# Patient Record
Sex: FEMALE | Race: WHITE | NOT HISPANIC OR LATINO | URBAN - METROPOLITAN AREA
[De-identification: names, ages, dates, MRNs, and addresses within clinical notes are randomized per-mention and may not be internally consistent; named-entity substitution may affect disease eponyms.]

---

## 2019-11-20 ENCOUNTER — OUTPATIENT (OUTPATIENT)
Dept: OUTPATIENT SERVICES | Facility: HOSPITAL | Age: 79
LOS: 1 days | Discharge: HOME | End: 2019-11-20
Payer: MEDICARE

## 2019-11-20 DIAGNOSIS — I10 ESSENTIAL (PRIMARY) HYPERTENSION: ICD-10-CM

## 2019-11-20 DIAGNOSIS — Z87.891 PERSONAL HISTORY OF NICOTINE DEPENDENCE: ICD-10-CM

## 2019-11-20 DIAGNOSIS — I20.0 UNSTABLE ANGINA: ICD-10-CM

## 2019-11-20 DIAGNOSIS — I25.110 ATHEROSCLEROTIC HEART DISEASE OF NATIVE CORONARY ARTERY WITH UNSTABLE ANGINA PECTORIS: ICD-10-CM

## 2019-11-20 DIAGNOSIS — E11.9 TYPE 2 DIABETES MELLITUS WITHOUT COMPLICATIONS: ICD-10-CM

## 2019-11-20 DIAGNOSIS — E66.9 OBESITY, UNSPECIFIED: ICD-10-CM

## 2019-11-20 DIAGNOSIS — E78.49 OTHER HYPERLIPIDEMIA: ICD-10-CM

## 2019-11-20 DIAGNOSIS — G62.9 POLYNEUROPATHY, UNSPECIFIED: ICD-10-CM

## 2019-11-20 LAB
ANION GAP SERPL CALC-SCNC: 17 MMOL/L — HIGH (ref 7–14)
BUN SERPL-MCNC: 21 MG/DL — HIGH (ref 10–20)
CALCIUM SERPL-MCNC: 9.3 MG/DL — SIGNIFICANT CHANGE UP (ref 8.5–10.1)
CHLORIDE SERPL-SCNC: 102 MMOL/L — SIGNIFICANT CHANGE UP (ref 98–110)
CO2 SERPL-SCNC: 23 MMOL/L — SIGNIFICANT CHANGE UP (ref 17–32)
CREAT SERPL-MCNC: 1.4 MG/DL — SIGNIFICANT CHANGE UP (ref 0.7–1.5)
GLUCOSE SERPL-MCNC: 84 MG/DL — SIGNIFICANT CHANGE UP (ref 70–99)
HCT VFR BLD CALC: 34.2 % — LOW (ref 37–47)
HGB BLD-MCNC: 9.7 G/DL — LOW (ref 12–16)
MCHC RBC-ENTMCNC: 23.7 PG — LOW (ref 27–31)
MCHC RBC-ENTMCNC: 28.4 G/DL — LOW (ref 32–37)
MCV RBC AUTO: 83.6 FL — SIGNIFICANT CHANGE UP (ref 81–99)
NRBC # BLD: 0 /100 WBCS — SIGNIFICANT CHANGE UP (ref 0–0)
PLATELET # BLD AUTO: 323 K/UL — SIGNIFICANT CHANGE UP (ref 130–400)
POTASSIUM SERPL-MCNC: 4.8 MMOL/L — SIGNIFICANT CHANGE UP (ref 3.5–5)
POTASSIUM SERPL-SCNC: 4.8 MMOL/L — SIGNIFICANT CHANGE UP (ref 3.5–5)
RBC # BLD: 4.09 M/UL — LOW (ref 4.2–5.4)
RBC # FLD: 17.4 % — HIGH (ref 11.5–14.5)
SODIUM SERPL-SCNC: 142 MMOL/L — SIGNIFICANT CHANGE UP (ref 135–146)
WBC # BLD: 9.18 K/UL — SIGNIFICANT CHANGE UP (ref 4.8–10.8)
WBC # FLD AUTO: 9.18 K/UL — SIGNIFICANT CHANGE UP (ref 4.8–10.8)

## 2019-11-20 PROCEDURE — 92928 PRQ TCAT PLMT NTRAC ST 1 LES: CPT | Mod: LD

## 2019-11-20 PROCEDURE — 93010 ELECTROCARDIOGRAM REPORT: CPT

## 2019-11-20 RX ORDER — TICAGRELOR 90 MG/1
1 TABLET ORAL
Qty: 60 | Refills: 3
Start: 2019-11-20 | End: 2020-03-18

## 2019-11-20 RX ORDER — ATORVASTATIN CALCIUM 80 MG/1
1 TABLET, FILM COATED ORAL
Qty: 30 | Refills: 3
Start: 2019-11-20 | End: 2020-03-18

## 2019-11-20 RX ORDER — CLOPIDOGREL BISULFATE 75 MG/1
1 TABLET, FILM COATED ORAL
Qty: 30 | Refills: 0
Start: 2019-11-20 | End: 2019-12-19

## 2019-11-20 RX ORDER — ASPIRIN/CALCIUM CARB/MAGNESIUM 324 MG
1 TABLET ORAL
Qty: 30 | Refills: 3
Start: 2019-11-20 | End: 2020-03-18

## 2019-11-20 NOTE — CHART NOTE - NSCHARTNOTEFT_GEN_A_CORE
PRE-OP DIAGNOSIS: Unstable angina    PROCEDURE:  [x] [facility] with coronary angiography                           [ ] [facility]  Physician: Dr Martinez  Assistant: MD Dom    ANESTHESIA TYPE:  [  ]General Anesthesia  [x] Sedation  [x] Local/Regional    ESTIMATED BLOOD LOSS:    10   mL    CONDITION  [  ] Critical  [  ] Serious  [  ]Fair  [x]Good      SPECIMENS REMOVED (IF APPLICABLE):      IV CONTRAST:      150       mL      ACCESS:    [x] right radial artery  [ ] right femoral artery    CLOSURE: D-stat    LEFT HEART CATHETERIZATION:                                    Coronary circulation: There was significant 2-vessel coronary artery disease (LAD and circumflex). Left main: The vessel was medium sized. Angiography showed minor luminal irregularities with no flow limiting lesions. LAD: The vessel was medium to large sized. Angiography showed a single discrete lesion. Mid LAD: There was a 99 % stenosis. There was HELENA grade 1 flow through the vessel (slow flow without perfusion). This lesion is a likely culprit for the patient's anginal symptoms. An intervention was performed. 1st diagonal: The vessel was medium sized. Angiography showed minor luminal irregularities with no flow limiting lesions. Circumflex: The vessel was medium sized. Angiography showed a single discrete lesion. Mid circumflex: There was a diffuse 60 % stenosis. The lesion was irregularly contoured. There was HELENA grade 3 flow through the vessel (brisk flow). 1st obtuse marginal: The vessel was small sized and tortuous. Angiography showed minor luminal irregularities with no flow limiting lesions. 2nd obtuse marginal: The vessel was small to medium sized and tortuous. Angiography showed minor luminal irregularities with no flow limiting lesions. RCA: The vessel was large sized (dominant). Angiography showed minor luminal irregularities with no flow limiting lesions. Right PDA: The vessel was medium sized and tortuous. Angiography showed minor luminal irregularities with no flow limiting lesions.     Complications: No complications occurred during the cath lab visit.     Impressions: There is significant double vessel coronary artery disease. Status-post mid-LAD VASQUEZ.     Recommendations:   Add Aspirin and Brilinta.     The patient should continue with the present medications except as noted above.   Patient management should include aggressive medical therapy, weight reduction, and aggressive risk factor modification. The patient should follow a low fat and low calorie diet.     PLAN OF CARE  [x] D/C Home today  [x] Continue DAPT, B-blocker & Statin therapy

## 2019-11-20 NOTE — PROGRESS NOTE ADULT - SUBJECTIVE AND OBJECTIVE BOX
Cardiology Follow up    KIMBER BARBER   79y Female  PAST MEDICAL & SURGICAL HISTORY:  Neuropathy  HTN (hypertension)  HLD (hyperlipidemia)  CAD (coronary artery disease)  DM (diabetes mellitus)       HPI:  Pre cath note:79 F wtih SIHD, DM, HTN presenting for an elective cardiac cath. 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 Complains of chest pressure intermittently.     indication:  [ ] STEMI                [ ] NSTEMI                 [ ] Acute coronary syndrome                     [x]Unstable Angina   [ ] high risk  [x] intermediate risk  [ ] low risk                     [ ] Stable Angina     non-invasive testing:             Echo             Date:                     result: [ ] high risk  [x] intermediate risk  [ ] low risk    Anti- Anginal medications:                    [ ] not used                       [ ] used                   [ ] not used but strong indication not to use    Ejection Fraction                   [ ] <29            [ ] 30-39%   [x] 40-49%     [ ]>50%    CHF                   [ ] active (within last 14 days on meds   [ ] Chronic (on meds but no exacerbation)    COPD                   [ ] mild (on chronic bronchodilators)  [ ] moderate (on chronic steroid therapy)      [ ] severe (indication for home O2 or PACO2 >50)    Other risk factors:                       [ ] Previous MI                     [ ] CVA/ stroke                    [ ] carotid stent/ CEA                    [ ] PVD/PAD- (arterial aneurysm, non-palpable pulses, tortuous vessel with inability to insert catheter, infra-renal dissection, renal or subclavian artery stenosis)                    [x] diabetic                    [ ] previous CABG                    [ ] Renal Failure (20 Nov 2019 09:20)    Allergies    No Known Allergies    Intolerances    Patient without complaints.   Denies CP, SOB, palpitations, or dizziness    HR: 65  BP: 136/66  BP(mean): 88  RR: 14  SpO2: 99% on RA    REVIEW OF SYSTEMS:          CONSTITUTIONAL: No weakness, fevers or chills          EYES/ENT: No visual changes;  No vertigo or throat pain           NECK: No pain or stiffness          RESPIRATORY: No cough, wheezing, hemoptysis          CARDIOVASCULAR: no pain, no JAIN, no palpitations           GASTROINTESTINAL: No abdominal or epigastric pain. No nausea, vomiting, or hematemesis;           GENITOURINARY: No dysuria, frequency or hematuria          NEUROLOGICAL: No numbness or weakness          SKIN: No itching, rashes    PHYSICAL EXAM:           CONSTITUTIONAL: Well-developed; well-nourished; in no acute distress  	SKIN: warm, dry  	HEAD: Normocephalic; atraumatic  	EYES: PERRL.  	ENT: No nasal discharge, airway clear, mucous membranes moist  	NECK: Supple; non tender.  	CARD: +S1, +S2, no murmurs, gallops, or rubs. Regular rate and rhythm    	RESP: No wheezes, rales or rhonchi. CTA B/L  	ABD: soft ntnd, + BS x 4 quadrants  	EXT: moves all extremities,  no clubbing, cyanosis or edema  	NEURO: Alert and oriented x3, no focal deficits          PSYCH: Cooperative, appropriate          VASCULAR:  +2 Rad / +2PTs / + 2DPs          EXTREMITY:             Right Groin:  Dressing removed, soft, no hematoma, no pain  	   Right Radial: Dressing removed, soft, no hematoma, no pain            ECG:   P    LABS:                        9.7    9.18  )-----------( 323      ( 20 Nov 2019 10:12 )             34.2     11-20    142  |  102  |  21<H>  ----------------------------<  84  4.8   |  23  |  1.4    Ca    9.3      20 Nov 2019 10:12      A/P:  I discussed the case with Cardiologist Dr. Rolon and recommend the following:    s/p PCI:  significant double vessel coronary artery disease. Status-post mid-LAD VASQUEZ.                        12 Lead ECG at 4 pm                    Xanan 0.5 mg PO x1 now                    Brilinta D/C due to dyspnea                    Load with Plavix 300 mg PO x1 now  	     Continue DAPT ( Aspirin 81 mg daily and Plavix 75 mg daily ), B-Blocker, Statin Therapy                   Patient given 30 day supply of ( Aspirin 81 mg daily and Plavix 75 mg daily ) to take at home                   Patient agreeing to take DAPT for at least one year or as directed by cardiologist                    Pt given instructions on importance of taking antiplatelet medication or risk acute stent thrombosis/death                   Post cath instructions, access site care and activity restrictions reviewed with patient                     Discussed with patient to return to hospital if experience chest pain, shortness breath, dizziness and site bleeding                   Aggressive risk factor modification, diet counseling, smoking cessation discussed with patient                       Can discharge patient from cardiac standpoint after ambulating without symptoms, ECG reviewed and access site wnl.                   Follow up with Cardiology Dr. Rolon  in one week.  Instructed to call and make an appointment Cardiology Follow up    KIMBER BARBER   79y Female  PAST MEDICAL & SURGICAL HISTORY:  Neuropathy  HTN (hypertension)  HLD (hyperlipidemia)  CAD (coronary artery disease)  DM (diabetes mellitus)       HPI:  Pre cath note:79 F wtih SIHD, DM, HTN presenting for an elective cardiac cath. 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 Complains of chest pressure intermittently.     indication:  [ ] STEMI                [ ] NSTEMI                 [ ] Acute coronary syndrome                     [x]Unstable Angina   [ ] high risk  [x] intermediate risk  [ ] low risk                     [ ] Stable Angina     non-invasive testing:             Echo             Date:                     result: [ ] high risk  [x] intermediate risk  [ ] low risk    Anti- Anginal medications:                    [ ] not used                       [ ] used                   [ ] not used but strong indication not to use    Ejection Fraction                   [ ] <29            [ ] 30-39%   [x] 40-49%     [ ]>50%    CHF                   [ ] active (within last 14 days on meds   [ ] Chronic (on meds but no exacerbation)    COPD                   [ ] mild (on chronic bronchodilators)  [ ] moderate (on chronic steroid therapy)      [ ] severe (indication for home O2 or PACO2 >50)    Other risk factors:                       [ ] Previous MI                     [ ] CVA/ stroke                    [ ] carotid stent/ CEA                    [ ] PVD/PAD- (arterial aneurysm, non-palpable pulses, tortuous vessel with inability to insert catheter, infra-renal dissection, renal or subclavian artery stenosis)                    [x] diabetic                    [ ] previous CABG                    [ ] Renal Failure (20 Nov 2019 09:20)    Allergies    No Known Allergies    Intolerances    Patient without complaints.   Denies CP, SOB, palpitations, or dizziness    HR: 65  BP: 136/66  BP(mean): 88  RR: 14  SpO2: 99% on RA    REVIEW OF SYSTEMS:          CONSTITUTIONAL: No weakness, fevers or chills          EYES/ENT: No visual changes;  No vertigo or throat pain           NECK: No pain or stiffness          RESPIRATORY: No cough, wheezing, hemoptysis          CARDIOVASCULAR: no pain, no JAIN, no palpitations           GASTROINTESTINAL: No abdominal or epigastric pain. No nausea, vomiting, or hematemesis;           GENITOURINARY: No dysuria, frequency or hematuria          NEUROLOGICAL: No numbness or weakness          SKIN: No itching, rashes    PHYSICAL EXAM:           CONSTITUTIONAL: Well-developed; well-nourished; in no acute distress  	SKIN: warm, dry  	HEAD: Normocephalic; atraumatic  	EYES: PERRL.  	ENT: No nasal discharge, airway clear, mucous membranes moist  	NECK: Supple; non tender.  	CARD: +S1, +S2, no murmurs, gallops, or rubs. Regular rate and rhythm    	RESP: No wheezes, rales or rhonchi. CTA B/L  	ABD: soft ntnd, + BS x 4 quadrants  	EXT: moves all extremities,  no clubbing, cyanosis or edema  	NEURO: Alert and oriented x3, no focal deficits          PSYCH: Cooperative, appropriate          VASCULAR:  +2 Rad / +2PTs / + 2DPs          EXTREMITY:             Right Groin:  Dressing removed, soft, no hematoma, no pain  	   Right Radial: Dressing removed, soft, no hematoma, no pain            ECG:   P    LABS:                        9.7    9.18  )-----------( 323      ( 20 Nov 2019 10:12 )             34.2     11-20    142  |  102  |  21<H>  ----------------------------<  84  4.8   |  23  |  1.4    Ca    9.3      20 Nov 2019 10:12      A/P:  I discussed the case with Cardiologist Dr. Rolon and recommend the following:    s/p PCI:  significant double vessel coronary artery disease. Status-post mid-LAD VASQUEZ.                        12 Lead ECG at 4 pm                    Xanax 0.5 mg PO x1 now                    Brilinta D/C due to dyspnea                    Load with Plavix 300 mg PO x1 now  	     Continue DAPT ( Aspirin 81 mg daily and Plavix 75 mg daily ), B-Blocker, Statin Therapy                   Patient given 30 day supply of ( Aspirin 81 mg daily and Plavix 75 mg daily ) to take at home                   Patient agreeing to take DAPT for at least one year or as directed by cardiologist                    Pt given instructions on importance of taking antiplatelet medication or risk acute stent thrombosis/death                   Post cath instructions, access site care and activity restrictions reviewed with patient                     Discussed with patient to return to hospital if experience chest pain, shortness breath, dizziness and site bleeding                   Aggressive risk factor modification, diet counseling, smoking cessation discussed with patient                       Can discharge patient from cardiac standpoint after ambulating without symptoms, ECG reviewed and access site wnl.                   Follow up with Cardiology Dr. Rolon  in one week.  Instructed to call and make an appointment

## 2019-11-20 NOTE — H&P CARDIOLOGY - HISTORY OF PRESENT ILLNESS
Pre cath note:79 F wtih SIHD, DM, HTN presenting for an elective cardiac cath. 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 Complains of chest pressure intermittently.     indication:  [ ] STEMI                [ ] NSTEMI                 [ ] Acute coronary syndrome                     [x]Unstable Angina   [ ] high risk  [x] intermediate risk  [ ] low risk                     [ ] Stable Angina     non-invasive testing:             Echo             Date:                     result: [ ] high risk  [x] intermediate risk  [ ] low risk    Anti- Anginal medications:                    [ ] not used                       [ ] used                   [ ] not used but strong indication not to use    Ejection Fraction                   [ ] <29            [ ] 30-39%   [x] 40-49%     [ ]>50%    CHF                   [ ] active (within last 14 days on meds   [ ] Chronic (on meds but no exacerbation)    COPD                   [ ] mild (on chronic bronchodilators)  [ ] moderate (on chronic steroid therapy)      [ ] severe (indication for home O2 or PACO2 >50)    Other risk factors:                       [ ] Previous MI                     [ ] CVA/ stroke                    [ ] carotid stent/ CEA                    [ ] PVD/PAD- (arterial aneurysm, non-palpable pulses, tortuous vessel with inability to insert catheter, infra-renal dissection, renal or subclavian artery stenosis)                    [x] diabetic                    [ ] previous CABG                    [ ] Renal Failure

## 2019-11-20 NOTE — ASU PATIENT PROFILE, ADULT - PMH
CAD (coronary artery disease)    DM (diabetes mellitus)    HLD (hyperlipidemia)    HTN (hypertension)    Neuropathy

## 2019-11-21 LAB — GLUCOSE BLDC GLUCOMTR-MCNC: 79 MG/DL — SIGNIFICANT CHANGE UP (ref 70–99)

## 2022-08-30 PROBLEM — I10 ESSENTIAL (PRIMARY) HYPERTENSION: Chronic | Status: ACTIVE | Noted: 2019-11-20

## 2022-08-30 PROBLEM — E78.5 HYPERLIPIDEMIA, UNSPECIFIED: Chronic | Status: ACTIVE | Noted: 2019-11-20

## 2022-08-30 PROBLEM — I25.10 ATHEROSCLEROTIC HEART DISEASE OF NATIVE CORONARY ARTERY WITHOUT ANGINA PECTORIS: Chronic | Status: ACTIVE | Noted: 2019-11-20

## 2022-08-30 PROBLEM — G62.9 POLYNEUROPATHY, UNSPECIFIED: Chronic | Status: ACTIVE | Noted: 2019-11-20

## 2022-08-30 PROBLEM — E11.9 TYPE 2 DIABETES MELLITUS WITHOUT COMPLICATIONS: Chronic | Status: ACTIVE | Noted: 2019-11-20

## 2023-01-22 ENCOUNTER — INPATIENT (INPATIENT)
Facility: HOSPITAL | Age: 83
LOS: 9 days | Discharge: HOME HEALTH | End: 2023-02-01
Attending: STUDENT IN AN ORGANIZED HEALTH CARE EDUCATION/TRAINING PROGRAM | Admitting: STUDENT IN AN ORGANIZED HEALTH CARE EDUCATION/TRAINING PROGRAM
Payer: MEDICARE

## 2023-01-22 VITALS
HEART RATE: 92 BPM | OXYGEN SATURATION: 97 % | SYSTOLIC BLOOD PRESSURE: 152 MMHG | RESPIRATION RATE: 18 BRPM | WEIGHT: 169.98 LBS | DIASTOLIC BLOOD PRESSURE: 66 MMHG | TEMPERATURE: 98 F

## 2023-01-22 LAB
ALBUMIN SERPL ELPH-MCNC: 3.7 G/DL — SIGNIFICANT CHANGE UP (ref 3.5–5.2)
ALP SERPL-CCNC: 72 U/L — SIGNIFICANT CHANGE UP (ref 30–115)
ALT FLD-CCNC: 17 U/L — SIGNIFICANT CHANGE UP (ref 0–41)
ANION GAP SERPL CALC-SCNC: 11 MMOL/L — SIGNIFICANT CHANGE UP (ref 7–14)
AST SERPL-CCNC: 47 U/L — HIGH (ref 0–41)
BASE EXCESS BLDV CALC-SCNC: 0.1 MMOL/L — SIGNIFICANT CHANGE UP (ref -2–3)
BASOPHILS # BLD AUTO: 0.02 K/UL — SIGNIFICANT CHANGE UP (ref 0–0.2)
BASOPHILS NFR BLD AUTO: 0.2 % — SIGNIFICANT CHANGE UP (ref 0–1)
BILIRUB SERPL-MCNC: <0.2 MG/DL — SIGNIFICANT CHANGE UP (ref 0.2–1.2)
BUN SERPL-MCNC: 16 MG/DL — SIGNIFICANT CHANGE UP (ref 10–20)
CA-I SERPL-SCNC: 1.24 MMOL/L — SIGNIFICANT CHANGE UP (ref 1.15–1.33)
CALCIUM SERPL-MCNC: 9.2 MG/DL — SIGNIFICANT CHANGE UP (ref 8.4–10.5)
CHLORIDE SERPL-SCNC: 109 MMOL/L — SIGNIFICANT CHANGE UP (ref 98–110)
CO2 SERPL-SCNC: 23 MMOL/L — SIGNIFICANT CHANGE UP (ref 17–32)
CREAT SERPL-MCNC: 0.9 MG/DL — SIGNIFICANT CHANGE UP (ref 0.7–1.5)
EGFR: 64 ML/MIN/1.73M2 — SIGNIFICANT CHANGE UP
EOSINOPHIL # BLD AUTO: 0.22 K/UL — SIGNIFICANT CHANGE UP (ref 0–0.7)
EOSINOPHIL NFR BLD AUTO: 2 % — SIGNIFICANT CHANGE UP (ref 0–8)
FLUAV AG NPH QL: SIGNIFICANT CHANGE UP
FLUBV AG NPH QL: SIGNIFICANT CHANGE UP
GAS PNL BLDV: 134 MMOL/L — LOW (ref 136–145)
GAS PNL BLDV: SIGNIFICANT CHANGE UP
GLUCOSE SERPL-MCNC: 99 MG/DL — SIGNIFICANT CHANGE UP (ref 70–99)
HCO3 BLDV-SCNC: 25 MMOL/L — SIGNIFICANT CHANGE UP (ref 22–29)
HCT VFR BLD CALC: 32.9 % — LOW (ref 37–47)
HCT VFR BLDA CALC: 35 % — LOW (ref 39–51)
HGB BLD CALC-MCNC: 11.6 G/DL — LOW (ref 12.6–17.4)
HGB BLD-MCNC: 9.3 G/DL — LOW (ref 12–16)
IMM GRANULOCYTES NFR BLD AUTO: 0.7 % — HIGH (ref 0.1–0.3)
LACTATE BLDV-MCNC: 0.9 MMOL/L — SIGNIFICANT CHANGE UP (ref 0.5–2)
LYMPHOCYTES # BLD AUTO: 1.32 K/UL — SIGNIFICANT CHANGE UP (ref 1.2–3.4)
LYMPHOCYTES # BLD AUTO: 12.3 % — LOW (ref 20.5–51.1)
MAGNESIUM SERPL-MCNC: 2 MG/DL — SIGNIFICANT CHANGE UP (ref 1.8–2.4)
MCHC RBC-ENTMCNC: 23 PG — LOW (ref 27–31)
MCHC RBC-ENTMCNC: 28.3 G/DL — LOW (ref 32–37)
MCV RBC AUTO: 81.4 FL — SIGNIFICANT CHANGE UP (ref 81–99)
MONOCYTES # BLD AUTO: 0.73 K/UL — HIGH (ref 0.1–0.6)
MONOCYTES NFR BLD AUTO: 6.8 % — SIGNIFICANT CHANGE UP (ref 1.7–9.3)
NEUTROPHILS # BLD AUTO: 8.4 K/UL — HIGH (ref 1.4–6.5)
NEUTROPHILS NFR BLD AUTO: 78 % — HIGH (ref 42.2–75.2)
NRBC # BLD: 0 /100 WBCS — SIGNIFICANT CHANGE UP (ref 0–0)
NT-PROBNP SERPL-SCNC: 681 PG/ML — HIGH (ref 0–300)
PCO2 BLDV: 39 MMHG — SIGNIFICANT CHANGE UP (ref 39–42)
PH BLDV: 7.41 — SIGNIFICANT CHANGE UP (ref 7.32–7.43)
PLATELET # BLD AUTO: 573 K/UL — HIGH (ref 130–400)
PO2 BLDV: 54 MMHG — SIGNIFICANT CHANGE UP
POTASSIUM BLDV-SCNC: 4.3 MMOL/L — SIGNIFICANT CHANGE UP (ref 3.5–5.1)
POTASSIUM SERPL-MCNC: 4.8 MMOL/L — SIGNIFICANT CHANGE UP (ref 3.5–5)
POTASSIUM SERPL-SCNC: 4.8 MMOL/L — SIGNIFICANT CHANGE UP (ref 3.5–5)
PROT SERPL-MCNC: 6.3 G/DL — SIGNIFICANT CHANGE UP (ref 6–8)
RBC # BLD: 4.04 M/UL — LOW (ref 4.2–5.4)
RBC # FLD: 19.8 % — HIGH (ref 11.5–14.5)
RSV RNA NPH QL NAA+NON-PROBE: SIGNIFICANT CHANGE UP
SAO2 % BLDV: 87.2 % — SIGNIFICANT CHANGE UP
SARS-COV-2 RNA SPEC QL NAA+PROBE: SIGNIFICANT CHANGE UP
SODIUM SERPL-SCNC: 143 MMOL/L — SIGNIFICANT CHANGE UP (ref 135–146)
TROPONIN T SERPL-MCNC: <0.01 NG/ML — SIGNIFICANT CHANGE UP
WBC # BLD: 10.76 K/UL — SIGNIFICANT CHANGE UP (ref 4.8–10.8)
WBC # FLD AUTO: 10.76 K/UL — SIGNIFICANT CHANGE UP (ref 4.8–10.8)

## 2023-01-22 PROCEDURE — 71045 X-RAY EXAM CHEST 1 VIEW: CPT | Mod: 26

## 2023-01-22 PROCEDURE — 99285 EMERGENCY DEPT VISIT HI MDM: CPT

## 2023-01-22 RX ORDER — IPRATROPIUM/ALBUTEROL SULFATE 18-103MCG
3 AEROSOL WITH ADAPTER (GRAM) INHALATION ONCE
Refills: 0 | Status: COMPLETED | OUTPATIENT
Start: 2023-01-22 | End: 2023-01-22

## 2023-01-22 RX ADMIN — Medication 3 MILLILITER(S): at 20:00

## 2023-01-22 NOTE — ED PROVIDER NOTE - NS ED ATTENDING STATEMENT MOD
This was a shared visit with the ESPERANZA. I reviewed and verified the documentation and independently performed the documented:

## 2023-01-22 NOTE — ED PROVIDER NOTE - PHYSICAL EXAMINATION
Constitutional: Well developed, well nourished. NAD  Head: Normocephalic, atraumatic.  Eyes: PERRL, EOMI.  ENT: No nasal discharge. Mucous membranes dry.  Neck: Supple. Painless ROM.  Cardiovascular: Regular rate and rhythm.    Pulmonary: + bibasilar rales/scattered rhonchi;     Abdominal: Soft. Nondistended. No rebound, guarding, rigidity.  Extremities. Pelvis stable. No lower extremity edema, symmetric calves.  Skin: No rashes, cyanosis.  Neuro: AAOx3. No focal neurological deficits.  Psych: Normal mood. Normal affect.

## 2023-01-22 NOTE — ED ADULT TRIAGE NOTE - CHIEF COMPLAINT QUOTE
Pt BIBA c/o COPD exacerbation states she has been having worsening SOB than usual for the last few days, O2 sat went down to 88% today. EMS endorses O2 Sat 93% on room air on their arrival. Pt not on home O2.   Pt on 2L O2 via NC now sat 97%, denies SOB/chest pain

## 2023-01-22 NOTE — ED ADULT NURSE NOTE - PRIMARY CARE PROVIDER
Wife reports feeling \"uncomfortable taking him home. \"  Concerned pt will fall \"if I try to help him go to the bathroom. \" PMD

## 2023-01-22 NOTE — ED PROVIDER NOTE - NS ED ROS FT
Constitutional: No fever, chills.  Eyes: No visual changes.  ENT: No hearing changes.  Neck: No neck pain or stiffness.  Cardiovascular: No chest pain, palpitations, edema.  Pulmonary:  see hpi  Abdominal:  No nausea, vomiting, diarrhea.  : No dysuria, frequency.  Neuro: No headache, syncope, dizziness.  MS: No back pain. No calf pain/swelling.  Psych: No suicidal ideations.

## 2023-01-22 NOTE — ED PROVIDER NOTE - NSICDXPASTMEDICALHX_GEN_ALL_CORE_FT
PAST MEDICAL HISTORY:  CAD (coronary artery disease)     DM (diabetes mellitus)     HLD (hyperlipidemia)     HTN (hypertension)     Neuropathy

## 2023-01-22 NOTE — ED PROVIDER NOTE - OBJECTIVE STATEMENT
82 yold female to Ed Pmhx Copd not home O2, Hx anemia on IV iron suppliments, Cad with stent x 1 2019, Dm, Htn; pt c/o sob x 1 week with cough clear phlegm, orthopnea, exertional dyspnea; pt denies fever, chills, sore throat, difficulty swallowing, chest pain, abdominal or back pain; pt not utd with covid/flu vaccinations; s/p recent treatment for uti with bactrim/cipro

## 2023-01-22 NOTE — ED PROVIDER NOTE - CARE PLAN
Principal Discharge DX:	Dyspnea  Secondary Diagnosis:	COPD exacerbation  Secondary Diagnosis:	Chest pain   1

## 2023-01-22 NOTE — ED PROVIDER NOTE - CLINICAL SUMMARY MEDICAL DECISION MAKING FREE TEXT BOX
82-year-old female with history of COPD not on home O2, CAD s/p stent, DM, HTN, anemia in ER with c/o 1 week h/o SOB, cough with clear sputum, decreased exercise tolerance.  + Episode of chest pain yesterday lasting for few minutes, now resolved.  No F/C.  No abdominal pain.  No N/V/D.  No lower extremity swelling.  PE - nad, nc/at, eomi, perrl, op - clear, mmm, neck supple, no resp distress, + b/l rhonchi, no w/r/r, rrr, abd- soft, nt/nd, nabs, from x 4, no LE swelling/tenderness, A&O x 3, cn 2-12 intact, no focal motor/sensory deficits.     -Labs reviewed: WBC 10, Hgb 9.3, CMP unremarkable, troponin negative, , PCO2 39 on VBG.  Nasal swab sent and pending.  CXR with bilateral opacities.  Patient given nebs, steroids, antibiotics in ER.  Patient admitted to medicine for continued care.

## 2023-01-23 LAB
A1C WITH ESTIMATED AVERAGE GLUCOSE RESULT: 5.6 % — SIGNIFICANT CHANGE UP (ref 4–5.6)
ALBUMIN SERPL ELPH-MCNC: 4 G/DL — SIGNIFICANT CHANGE UP (ref 3.5–5.2)
ALP SERPL-CCNC: 77 U/L — SIGNIFICANT CHANGE UP (ref 30–115)
ALT FLD-CCNC: 14 U/L — SIGNIFICANT CHANGE UP (ref 0–41)
ANION GAP SERPL CALC-SCNC: 12 MMOL/L — SIGNIFICANT CHANGE UP (ref 7–14)
AST SERPL-CCNC: 15 U/L — SIGNIFICANT CHANGE UP (ref 0–41)
BASOPHILS # BLD AUTO: 0.01 K/UL — SIGNIFICANT CHANGE UP (ref 0–0.2)
BASOPHILS NFR BLD AUTO: 0.1 % — SIGNIFICANT CHANGE UP (ref 0–1)
BILIRUB SERPL-MCNC: <0.2 MG/DL — SIGNIFICANT CHANGE UP (ref 0.2–1.2)
BUN SERPL-MCNC: 14 MG/DL — SIGNIFICANT CHANGE UP (ref 10–20)
CALCIUM SERPL-MCNC: 9.5 MG/DL — SIGNIFICANT CHANGE UP (ref 8.4–10.5)
CHLORIDE SERPL-SCNC: 106 MMOL/L — SIGNIFICANT CHANGE UP (ref 98–110)
CO2 SERPL-SCNC: 23 MMOL/L — SIGNIFICANT CHANGE UP (ref 17–32)
CREAT SERPL-MCNC: 0.8 MG/DL — SIGNIFICANT CHANGE UP (ref 0.7–1.5)
EGFR: 74 ML/MIN/1.73M2 — SIGNIFICANT CHANGE UP
EOSINOPHIL # BLD AUTO: 0.01 K/UL — SIGNIFICANT CHANGE UP (ref 0–0.7)
EOSINOPHIL NFR BLD AUTO: 0.1 % — SIGNIFICANT CHANGE UP (ref 0–8)
ESTIMATED AVERAGE GLUCOSE: 114 MG/DL — SIGNIFICANT CHANGE UP (ref 68–114)
FERRITIN SERPL-MCNC: 207 NG/ML — HIGH (ref 15–150)
GLUCOSE BLDC GLUCOMTR-MCNC: 139 MG/DL — HIGH (ref 70–99)
GLUCOSE BLDC GLUCOMTR-MCNC: 155 MG/DL — HIGH (ref 70–99)
GLUCOSE BLDC GLUCOMTR-MCNC: 163 MG/DL — HIGH (ref 70–99)
GLUCOSE SERPL-MCNC: 150 MG/DL — HIGH (ref 70–99)
HCT VFR BLD CALC: 32.1 % — LOW (ref 37–47)
HGB BLD-MCNC: 9.4 G/DL — LOW (ref 12–16)
IMM GRANULOCYTES NFR BLD AUTO: 0.7 % — HIGH (ref 0.1–0.3)
IRON SATN MFR SERPL: 10 % — LOW (ref 15–50)
IRON SATN MFR SERPL: 27 UG/DL — LOW (ref 35–150)
LYMPHOCYTES # BLD AUTO: 0.34 K/UL — LOW (ref 1.2–3.4)
LYMPHOCYTES # BLD AUTO: 3.5 % — LOW (ref 20.5–51.1)
MAGNESIUM SERPL-MCNC: 2.1 MG/DL — SIGNIFICANT CHANGE UP (ref 1.8–2.4)
MCHC RBC-ENTMCNC: 23.6 PG — LOW (ref 27–31)
MCHC RBC-ENTMCNC: 29.3 G/DL — LOW (ref 32–37)
MCV RBC AUTO: 80.5 FL — LOW (ref 81–99)
MONOCYTES # BLD AUTO: 0.07 K/UL — LOW (ref 0.1–0.6)
MONOCYTES NFR BLD AUTO: 0.7 % — LOW (ref 1.7–9.3)
NEUTROPHILS # BLD AUTO: 9.2 K/UL — HIGH (ref 1.4–6.5)
NEUTROPHILS NFR BLD AUTO: 94.9 % — HIGH (ref 42.2–75.2)
NRBC # BLD: 0 /100 WBCS — SIGNIFICANT CHANGE UP (ref 0–0)
PLATELET # BLD AUTO: 533 K/UL — HIGH (ref 130–400)
POTASSIUM SERPL-MCNC: 4.2 MMOL/L — SIGNIFICANT CHANGE UP (ref 3.5–5)
POTASSIUM SERPL-SCNC: 4.2 MMOL/L — SIGNIFICANT CHANGE UP (ref 3.5–5)
PROCALCITONIN SERPL-MCNC: 0.09 NG/ML — SIGNIFICANT CHANGE UP (ref 0.02–0.1)
PROT SERPL-MCNC: 6.5 G/DL — SIGNIFICANT CHANGE UP (ref 6–8)
RBC # BLD: 3.99 M/UL — LOW (ref 4.2–5.4)
RBC # FLD: 19.5 % — HIGH (ref 11.5–14.5)
SODIUM SERPL-SCNC: 141 MMOL/L — SIGNIFICANT CHANGE UP (ref 135–146)
TIBC SERPL-MCNC: 269 UG/DL — SIGNIFICANT CHANGE UP (ref 220–430)
TROPONIN T SERPL-MCNC: <0.01 NG/ML — SIGNIFICANT CHANGE UP
UIBC SERPL-MCNC: 242 UG/DL — SIGNIFICANT CHANGE UP (ref 110–370)
WBC # BLD: 9.7 K/UL — SIGNIFICANT CHANGE UP (ref 4.8–10.8)
WBC # FLD AUTO: 9.7 K/UL — SIGNIFICANT CHANGE UP (ref 4.8–10.8)

## 2023-01-23 PROCEDURE — 71250 CT THORAX DX C-: CPT | Mod: 26

## 2023-01-23 PROCEDURE — 99222 1ST HOSP IP/OBS MODERATE 55: CPT

## 2023-01-23 PROCEDURE — 99497 ADVNCD CARE PLAN 30 MIN: CPT

## 2023-01-23 PROCEDURE — 93010 ELECTROCARDIOGRAM REPORT: CPT

## 2023-01-23 RX ORDER — SODIUM CHLORIDE 9 MG/ML
1000 INJECTION, SOLUTION INTRAVENOUS
Refills: 0 | Status: DISCONTINUED | OUTPATIENT
Start: 2023-01-23 | End: 2023-02-01

## 2023-01-23 RX ORDER — CEFEPIME 1 G/1
2000 INJECTION, POWDER, FOR SOLUTION INTRAMUSCULAR; INTRAVENOUS ONCE
Refills: 0 | Status: COMPLETED | OUTPATIENT
Start: 2023-01-23 | End: 2023-01-23

## 2023-01-23 RX ORDER — CARVEDILOL PHOSPHATE 80 MG/1
1 CAPSULE, EXTENDED RELEASE ORAL
Qty: 0 | Refills: 0 | DISCHARGE

## 2023-01-23 RX ORDER — LOSARTAN POTASSIUM 100 MG/1
25 TABLET, FILM COATED ORAL DAILY
Refills: 0 | Status: DISCONTINUED | OUTPATIENT
Start: 2023-01-23 | End: 2023-02-01

## 2023-01-23 RX ORDER — GABAPENTIN 400 MG/1
600 CAPSULE ORAL
Refills: 0 | Status: DISCONTINUED | OUTPATIENT
Start: 2023-01-23 | End: 2023-02-01

## 2023-01-23 RX ORDER — AZITHROMYCIN 500 MG/1
500 TABLET, FILM COATED ORAL EVERY 24 HOURS
Refills: 0 | Status: DISCONTINUED | OUTPATIENT
Start: 2023-01-23 | End: 2023-01-23

## 2023-01-23 RX ORDER — AZITHROMYCIN 500 MG/1
500 TABLET, FILM COATED ORAL ONCE
Refills: 0 | Status: COMPLETED | OUTPATIENT
Start: 2023-01-23 | End: 2023-01-23

## 2023-01-23 RX ORDER — CLOPIDOGREL BISULFATE 75 MG/1
75 TABLET, FILM COATED ORAL DAILY
Refills: 0 | Status: DISCONTINUED | OUTPATIENT
Start: 2023-01-23 | End: 2023-02-01

## 2023-01-23 RX ORDER — OXYBUTYNIN CHLORIDE 5 MG
5 TABLET ORAL
Refills: 0 | Status: DISCONTINUED | OUTPATIENT
Start: 2023-01-23 | End: 2023-02-01

## 2023-01-23 RX ORDER — ONDANSETRON 8 MG/1
4 TABLET, FILM COATED ORAL EVERY 8 HOURS
Refills: 0 | Status: DISCONTINUED | OUTPATIENT
Start: 2023-01-23 | End: 2023-02-01

## 2023-01-23 RX ORDER — CEFEPIME 1 G/1
2000 INJECTION, POWDER, FOR SOLUTION INTRAMUSCULAR; INTRAVENOUS EVERY 8 HOURS
Refills: 0 | Status: DISCONTINUED | OUTPATIENT
Start: 2023-01-24 | End: 2023-01-25

## 2023-01-23 RX ORDER — CARVEDILOL PHOSPHATE 80 MG/1
12.5 CAPSULE, EXTENDED RELEASE ORAL EVERY 12 HOURS
Refills: 0 | Status: DISCONTINUED | OUTPATIENT
Start: 2023-01-23 | End: 2023-02-01

## 2023-01-23 RX ORDER — GABAPENTIN 400 MG/1
1 CAPSULE ORAL
Qty: 0 | Refills: 0 | DISCHARGE

## 2023-01-23 RX ORDER — ASPIRIN/CALCIUM CARB/MAGNESIUM 324 MG
1 TABLET ORAL
Qty: 0 | Refills: 3 | DISCHARGE

## 2023-01-23 RX ORDER — LANOLIN ALCOHOL/MO/W.PET/CERES
3 CREAM (GRAM) TOPICAL AT BEDTIME
Refills: 0 | Status: DISCONTINUED | OUTPATIENT
Start: 2023-01-23 | End: 2023-02-01

## 2023-01-23 RX ORDER — BUDESONIDE AND FORMOTEROL FUMARATE DIHYDRATE 160; 4.5 UG/1; UG/1
2 AEROSOL RESPIRATORY (INHALATION)
Refills: 0 | Status: DISCONTINUED | OUTPATIENT
Start: 2023-01-23 | End: 2023-02-01

## 2023-01-23 RX ORDER — CEFEPIME 1 G/1
2000 INJECTION, POWDER, FOR SOLUTION INTRAMUSCULAR; INTRAVENOUS EVERY 12 HOURS
Refills: 0 | Status: DISCONTINUED | OUTPATIENT
Start: 2023-01-23 | End: 2023-01-23

## 2023-01-23 RX ORDER — IPRATROPIUM/ALBUTEROL SULFATE 18-103MCG
3 AEROSOL WITH ADAPTER (GRAM) INHALATION EVERY 6 HOURS
Refills: 0 | Status: DISCONTINUED | OUTPATIENT
Start: 2023-01-23 | End: 2023-02-01

## 2023-01-23 RX ORDER — SERTRALINE 25 MG/1
100 TABLET, FILM COATED ORAL DAILY
Refills: 0 | Status: DISCONTINUED | OUTPATIENT
Start: 2023-01-23 | End: 2023-02-01

## 2023-01-23 RX ORDER — ATORVASTATIN CALCIUM 80 MG/1
40 TABLET, FILM COATED ORAL AT BEDTIME
Refills: 0 | Status: DISCONTINUED | OUTPATIENT
Start: 2023-01-23 | End: 2023-02-01

## 2023-01-23 RX ORDER — LISINOPRIL 2.5 MG/1
1 TABLET ORAL
Qty: 0 | Refills: 0 | DISCHARGE

## 2023-01-23 RX ORDER — PANTOPRAZOLE SODIUM 20 MG/1
40 TABLET, DELAYED RELEASE ORAL
Refills: 0 | Status: DISCONTINUED | OUTPATIENT
Start: 2023-01-23 | End: 2023-02-01

## 2023-01-23 RX ORDER — CEFTRIAXONE 500 MG/1
1000 INJECTION, POWDER, FOR SOLUTION INTRAMUSCULAR; INTRAVENOUS ONCE
Refills: 0 | Status: COMPLETED | OUTPATIENT
Start: 2023-01-23 | End: 2023-01-23

## 2023-01-23 RX ORDER — TIOTROPIUM BROMIDE 18 UG/1
2 CAPSULE ORAL; RESPIRATORY (INHALATION) DAILY
Refills: 0 | Status: DISCONTINUED | OUTPATIENT
Start: 2023-01-23 | End: 2023-02-01

## 2023-01-23 RX ORDER — RISPERIDONE 4 MG/1
1 TABLET ORAL
Qty: 0 | Refills: 0 | DISCHARGE

## 2023-01-23 RX ORDER — RISPERIDONE 4 MG/1
1 TABLET ORAL AT BEDTIME
Refills: 0 | Status: DISCONTINUED | OUTPATIENT
Start: 2023-01-23 | End: 2023-02-01

## 2023-01-23 RX ORDER — ACETAMINOPHEN 500 MG
650 TABLET ORAL EVERY 6 HOURS
Refills: 0 | Status: DISCONTINUED | OUTPATIENT
Start: 2023-01-23 | End: 2023-02-01

## 2023-01-23 RX ORDER — VANCOMYCIN HCL 1 G
1000 VIAL (EA) INTRAVENOUS EVERY 12 HOURS
Refills: 0 | Status: DISCONTINUED | OUTPATIENT
Start: 2023-01-23 | End: 2023-01-24

## 2023-01-23 RX ORDER — ASPIRIN/CALCIUM CARB/MAGNESIUM 324 MG
81 TABLET ORAL DAILY
Refills: 0 | Status: DISCONTINUED | OUTPATIENT
Start: 2023-01-23 | End: 2023-02-01

## 2023-01-23 RX ORDER — DEXTROSE 50 % IN WATER 50 %
25 SYRINGE (ML) INTRAVENOUS ONCE
Refills: 0 | Status: DISCONTINUED | OUTPATIENT
Start: 2023-01-23 | End: 2023-02-01

## 2023-01-23 RX ORDER — INSULIN LISPRO 100/ML
VIAL (ML) SUBCUTANEOUS
Refills: 0 | Status: DISCONTINUED | OUTPATIENT
Start: 2023-01-23 | End: 2023-02-01

## 2023-01-23 RX ORDER — ALPRAZOLAM 0.25 MG
1 TABLET ORAL
Refills: 0 | Status: DISCONTINUED | OUTPATIENT
Start: 2023-01-23 | End: 2023-01-30

## 2023-01-23 RX ORDER — CEFEPIME 1 G/1
INJECTION, POWDER, FOR SOLUTION INTRAMUSCULAR; INTRAVENOUS
Refills: 0 | Status: DISCONTINUED | OUTPATIENT
Start: 2023-01-23 | End: 2023-01-23

## 2023-01-23 RX ORDER — ENOXAPARIN SODIUM 100 MG/ML
40 INJECTION SUBCUTANEOUS EVERY 24 HOURS
Refills: 0 | Status: DISCONTINUED | OUTPATIENT
Start: 2023-01-23 | End: 2023-02-01

## 2023-01-23 RX ORDER — INSULIN GLARGINE 100 [IU]/ML
19 INJECTION, SOLUTION SUBCUTANEOUS AT BEDTIME
Refills: 0 | Status: DISCONTINUED | OUTPATIENT
Start: 2023-01-23 | End: 2023-01-25

## 2023-01-23 RX ORDER — DEXTROSE 50 % IN WATER 50 %
15 SYRINGE (ML) INTRAVENOUS ONCE
Refills: 0 | Status: DISCONTINUED | OUTPATIENT
Start: 2023-01-23 | End: 2023-02-01

## 2023-01-23 RX ORDER — CEFTRIAXONE 500 MG/1
1000 INJECTION, POWDER, FOR SOLUTION INTRAMUSCULAR; INTRAVENOUS EVERY 24 HOURS
Refills: 0 | Status: DISCONTINUED | OUTPATIENT
Start: 2023-01-23 | End: 2023-01-23

## 2023-01-23 RX ORDER — GLUCAGON INJECTION, SOLUTION 0.5 MG/.1ML
1 INJECTION, SOLUTION SUBCUTANEOUS ONCE
Refills: 0 | Status: DISCONTINUED | OUTPATIENT
Start: 2023-01-23 | End: 2023-02-01

## 2023-01-23 RX ADMIN — INSULIN GLARGINE 19 UNIT(S): 100 INJECTION, SOLUTION SUBCUTANEOUS at 22:13

## 2023-01-23 RX ADMIN — Medication 5 MILLIGRAM(S): at 05:40

## 2023-01-23 RX ADMIN — Medication 250 MILLIGRAM(S): at 17:58

## 2023-01-23 RX ADMIN — AZITHROMYCIN 255 MILLIGRAM(S): 500 TABLET, FILM COATED ORAL at 00:00

## 2023-01-23 RX ADMIN — Medication 650 MILLIGRAM(S): at 20:57

## 2023-01-23 RX ADMIN — Medication 2: at 13:07

## 2023-01-23 RX ADMIN — Medication 5 MILLIGRAM(S): at 17:55

## 2023-01-23 RX ADMIN — CEFTRIAXONE 100 MILLIGRAM(S): 500 INJECTION, POWDER, FOR SOLUTION INTRAMUSCULAR; INTRAVENOUS at 00:00

## 2023-01-23 RX ADMIN — LOSARTAN POTASSIUM 25 MILLIGRAM(S): 100 TABLET, FILM COATED ORAL at 05:40

## 2023-01-23 RX ADMIN — CARVEDILOL PHOSPHATE 12.5 MILLIGRAM(S): 80 CAPSULE, EXTENDED RELEASE ORAL at 17:55

## 2023-01-23 RX ADMIN — GABAPENTIN 600 MILLIGRAM(S): 400 CAPSULE ORAL at 17:56

## 2023-01-23 RX ADMIN — ATORVASTATIN CALCIUM 40 MILLIGRAM(S): 80 TABLET, FILM COATED ORAL at 22:09

## 2023-01-23 RX ADMIN — Medication 3 MILLILITER(S): at 08:05

## 2023-01-23 RX ADMIN — SERTRALINE 100 MILLIGRAM(S): 25 TABLET, FILM COATED ORAL at 12:03

## 2023-01-23 RX ADMIN — Medication 250 MILLIGRAM(S): at 07:00

## 2023-01-23 RX ADMIN — CEFEPIME 100 MILLIGRAM(S): 1 INJECTION, POWDER, FOR SOLUTION INTRAMUSCULAR; INTRAVENOUS at 17:59

## 2023-01-23 RX ADMIN — Medication 81 MILLIGRAM(S): at 11:18

## 2023-01-23 RX ADMIN — GABAPENTIN 600 MILLIGRAM(S): 400 CAPSULE ORAL at 05:40

## 2023-01-23 RX ADMIN — CLOPIDOGREL BISULFATE 75 MILLIGRAM(S): 75 TABLET, FILM COATED ORAL at 11:18

## 2023-01-23 RX ADMIN — ENOXAPARIN SODIUM 40 MILLIGRAM(S): 100 INJECTION SUBCUTANEOUS at 11:19

## 2023-01-23 RX ADMIN — Medication 40 MILLIGRAM(S): at 12:03

## 2023-01-23 RX ADMIN — Medication 1 MILLIGRAM(S): at 17:56

## 2023-01-23 RX ADMIN — Medication 2: at 08:05

## 2023-01-23 RX ADMIN — RISPERIDONE 1 MILLIGRAM(S): 4 TABLET ORAL at 22:10

## 2023-01-23 RX ADMIN — Medication 1 MILLIGRAM(S): at 05:39

## 2023-01-23 RX ADMIN — CEFEPIME 100 MILLIGRAM(S): 1 INJECTION, POWDER, FOR SOLUTION INTRAMUSCULAR; INTRAVENOUS at 06:59

## 2023-01-23 RX ADMIN — Medication 125 MILLIGRAM(S): at 00:00

## 2023-01-23 RX ADMIN — CARVEDILOL PHOSPHATE 12.5 MILLIGRAM(S): 80 CAPSULE, EXTENDED RELEASE ORAL at 05:40

## 2023-01-23 NOTE — H&P ADULT - HISTORY OF PRESENT ILLNESS
81 yo female Pmhx Copd not home O2, Cad with stent x 1 2019, Dm, Htn      VITALS:   T(C): 36.7 (01-22-23 @ 19:37), Max: 36.7 (01-22-23 @ 19:37)  HR: 92 (01-22-23 @ 19:37) (92 - 92)  BP: 152/66 (01-22-23 @ 19:37) (152/66 - 152/66)  RR: 18 (01-22-23 @ 19:37) (18 - 18)  SpO2: 97% (01-22-23 @ 19:37) (97% - 97%)    in ED, vitals and labs were WNLs. patient received solumedrol 125 mg IV, ceftriaxone/azithromycin and admitted to tele for further management 81 yo female Pmhx Copd not home O2, Cad with stent x 1 2019, Dm, Htn presenting for SOB. history goes back to 1 week ago when patient started complaining of sudden onset SOB, mostly on exertion associated with productive cough of whitish sputum. she reported chills but denied fever, sore throat, rhinorrhea, congestion, headache, myalgia, sick contacts, chest pain, abdominal pain, N/V, diarrhea or dysuria. patient mentioned that her SOB worsened so she decided to present to the ED.      VITALS:   T(C): 36.7 (01-22-23 @ 19:37), Max: 36.7 (01-22-23 @ 19:37)  HR: 92 (01-22-23 @ 19:37) (92 - 92)  BP: 152/66 (01-22-23 @ 19:37) (152/66 - 152/66)  RR: 18 (01-22-23 @ 19:37) (18 - 18)  SpO2: 97% (01-22-23 @ 19:37) (97% - 97%)    in ED, vitals and labs were WNLs. patient received solumedrol 125 mg IV, ceftriaxone/azithromycin and admitted to tele for further management 83 yo female Pmhx Copd not home O2, Cad with stent x 1 2019, Dm, Htn presenting for SOB. history goes back to 1 week ago when patient started complaining of sudden onset SOB, mostly on exertion associated with productive cough of whitish sputum. she reported chills but denied fever, sore throat, rhinorrhea, congestion, headache, myalgia, sick contacts, chest pain, abdominal pain, N/V, diarrhea or dysuria. patient mentioned yesterday she had an episode of midsternal burning chest pain, non radiating, lasted for around 5 mins, not related to exertions, worsened with chest palpation and resolved by itself. she also mentioned that her SOB worsened so she decided to present to the ED.      VITALS:   T(C): 36.7 (01-22-23 @ 19:37), Max: 36.7 (01-22-23 @ 19:37)  HR: 92 (01-22-23 @ 19:37) (92 - 92)  BP: 152/66 (01-22-23 @ 19:37) (152/66 - 152/66)  RR: 18 (01-22-23 @ 19:37) (18 - 18)  SpO2: 97% (01-22-23 @ 19:37) (97% - 97%)    in ED, vitals and labs were WNLs. patient received solumedrol 125 mg IV, ceftriaxone/azithromycin and admitted to tele for further management

## 2023-01-23 NOTE — H&P ADULT - ASSESSMENT
83 yo female Pmhx Copd not home O2, Cad with stent x 1 2019, Dm, Htn    #RLL PNA  - SOB  - no fever or leukocytosis  - CXR -> RLL consolidation and left pleural effusion  - s/p ceftriaxone/azithromycin in ED  - fu procal  - f/u blood culture    #COPD      #anemia  - Hb 9.3 at baseline  - iron profile in AM    #CAD  #HTN      #DM      #MISC  DVT PPX  GI PPX  DIET  CODE 83 yo female Pmhx Copd not home O2, Cad with stent x 1 2019, Dm, Htn presented for SOB    #RLL PNA  - SOB  - no fever or leukocytosis  - CXR -> RLL consolidation and left pleural effusion  - s/p ceftriaxone/azithromycin in ED  - fu procal  - f/u blood culture    #COPD      #anemia  - Hb 9.3 at baseline  - iron profile in AM    #CAD  #HTN      #DM      #MISC  DVT PPX  GI PPX  DIET  CODE 83 yo female Pmhx Copd not home O2, Cad with stent x 1 2019, Dm, Htn presented for SOB    #RLL PNA  #COPD exacerbation  - SOB since 1 week prior to presentation  - no fever or leukocytosis  - CXR -> RLL consolidation and left pleural effusion  - s/p ceftriaxone/azithromycin in ED -> c/w with same abx  - s/p solumedrol 125 mg iv in ED -> c/w solumedrol 40 BID  - c/w duoneb  - fu procal  - f/u blood culture      #anemia  - Hb 9.3 at baseline  - on IV iron supplementation?  - iron profile in AM    #CAD  #HTN  - s/p stent 2019  - c/w carvedilol, ASA, losartan  - follows with Dr Leyva    #DM  - on trajenta at home  - monitor FS    #HLD  - c/w atorvastatin    #MISC  DVT PPX  GI PPX  DIET  CODE 81 yo female Pmhx Copd not home O2, Cad with stent x 1 2019, Dm, Htn presented for SOB    #RLL PNA  #COPD exacerbation  - SOB since 1 week prior to presentation associated with productive cough  - no fever or leukocytosis  - CXR -> RLL consolidation and left pleural effusion -> f/u official report  - s/p ceftriaxone/azithromycin in ED -> c/w with same abx  - s/p solumedrol 125 mg iv in ED -> c/w solumedrol 40 BID  - currently spo2 95% on 2lNC  - urine strep and legionella  - MRSA swab  - RSV-COVID-FLU negative -> will get full RVP  - c/w duoneb  - fu procal  - f/u blood culture and sputum culture      #anemia  - Hb 9.3 at baseline  - on IV iron supplementation?  - iron profile in AM    #CAD  #HTN  - s/p stent 2019  - c/w carvedilol, ASA, losartan  - follows with Dr Leyva    #DM  - on trajenta at home  - monitor FS    #HLD  - c/w atorvastatin    #MISC  DVT PPX Lovenox  GI PPX Protonix  DIET DASH/TLC/CC  CODE DNR/DNI 83 yo female Pmhx Copd not home O2, Cad with stent x 1 2019, Dm, Htn presented for SOB    #RLL PNA  #COPD exacerbation  - SOB since 1 week prior to presentation associated with productive cough  - no fever or leukocytosis  - CXR -> RLL consolidation and left pleural effusion -> f/u official report  - s/p ceftriaxone/azithromycin in ED -> c/w with same abx  - s/p solumedrol 125 mg iv in ED -> c/w solumedrol 40 BID  - currently spo2 95% on 2lNC  - urine strep and legionella  - MRSA swab  - RSV-COVID-FLU negative -> will get full RVP  - c/w duoneb  - fu procal  - f/u blood culture and sputum culture  - pulm eval      #anemia  - Hb 9.3 at baseline  - on IV iron supplementation?  - iron profile in AM    #chest pain  #CAD- s/p stent 2019  #HTN  - patient mentioned 1 episode of chest burning that has resolved  - chest tender to palpation -> most likely pain related to PNA  - trop <0.01 -> f/u am trops  - fu EKG  - c/w carvedilol, losartan  - c/w DAPT  (patient does not know why she is on both)  - follows with Dr Leyva    #DM  - on trajenta at home  - monitor FS    #HLD  - c/w atorvastatin    #MISC  DVT PPX Lovenox  GI PPX Protonix  DIET DASH/TLC/CC  CODE DNR/DNI

## 2023-01-23 NOTE — H&P ADULT - CONVERSATION DETAILS
spoke to patient regarding wishes if she deteriorates. I explained to her the meaning of resuscitation and intubation. she mentioned that she does not want to be intubated or resuscitated in case of deterioration.    patient would like to be DNR/DNI    MOLST in chart

## 2023-01-23 NOTE — CONSULT NOTE ADULT - ATTENDING COMMENTS
events noted, worsening SOB over 1 week, COPD, no fever, CXR noted, PNA/ RVP reviewed/ ABX doxy/ recephin, steroids, Chest CT, echo, LE doppler

## 2023-01-23 NOTE — CONSULT NOTE ADULT - ASSESSMENT
ASSESSMENT  82y F admitted with DYSPNEA; COPD EXACERBATION; CHEST PAIN    HPI:  83 yo female Pmhx Copd not home O2, Cad with stent x 1 2019, Dm, Htn presenting for SOB. History goes back to 1 week ago when patient started complaining of sudden onset SOB, mostly on exertion associated with productive cough of whitish sputum. She reported chills but denied fever, sore throat, rhinorrhea, congestion, headache, myalgia, sick contacts, chest pain, abdominal pain, N/V, diarrhea or dysuria. Patient mentioned yesterday she had an episode of midsternal burning chest pain, non radiating, lasted for around 5 mins, not related to exertions, worsened with chest palpation and resolved by itself. She also mentioned that her SOB worsened so she decided to present to the ED.    PROBLEMS  Suspected Gram negative pneumonia, R/O MRSA pneumonia in pt with COPD, CAD and hx MRSA    On cefepime   IVPB 2000 milliGRAM(s) IV Intermittent every 12 hours  vancomycin  IVPB 1000 milliGRAM(s) IV Intermittent every 12 hours    Chest pain in pt with DM and hx CAD w/stent    PLAN  - Await blood cultures, sputum culture  - Nasal MRSA PCR  - Urine Legionella and pneumococcal antigens  - Continue Vanco, Cefepime  - Vanco trough prior to 4th dose   - Monitor Cr  - official Cxray result

## 2023-01-23 NOTE — CONSULT NOTE ADULT - SUBJECTIVE AND OBJECTIVE BOX
KIMBER BARBER  82y, Female  Allergy: No Known Allergies      CHIEF COMPLAINT: CAP (23 Jan 2023 09:02)      HPI:  83 yo female Pmhx Copd not home O2, Cad with stent x 1 2019, Dm, Htn presenting for SOB. history goes back to 1 week ago when patient started complaining of sudden onset SOB, mostly on exertion associated with productive cough of whitish sputum. she reported chills but denied fever, sore throat, rhinorrhea, congestion, headache, myalgia, sick contacts, chest pain, abdominal pain, N/V, diarrhea or dysuria. patient mentioned yesterday she had an episode of midsternal burning chest pain, non radiating, lasted for around 5 mins, not related to exertions, worsened with chest palpation and resolved by itself. she also mentioned that her SOB worsened so she decided to present to the ED.      VITALS:   T(C): 36.7 (01-22-23 @ 19:37), Max: 36.7 (01-22-23 @ 19:37)  HR: 92 (01-22-23 @ 19:37) (92 - 92)  BP: 152/66 (01-22-23 @ 19:37) (152/66 - 152/66)  RR: 18 (01-22-23 @ 19:37) (18 - 18)  SpO2: 97% (01-22-23 @ 19:37) (97% - 97%)    in ED, vitals and labs were WNLs. patient received solumedrol 125 mg IV, ceftriaxone/azithromycin and admitted to tele for further management (23 Jan 2023 01:11)    FAMILY HISTORY:    PAST MEDICAL & SURGICAL HISTORY:  DM (diabetes mellitus)      CAD (coronary artery disease)      HLD (hyperlipidemia)      HTN (hypertension)      Neuropathy      COPD (chronic obstructive pulmonary disease)          SOCIAL HISTORY  Social History:  non smoker  non alcoholic  lives with daughter (23 Jan 2023 01:11)        ROS  General: Denies fevers, chills, nightsweats, weight loss  HEENT: Denies headache, rhinorrhea, sore throat, eye pain  GI: Denies abdominal pain, diarrhea  : Denies dysuria, hematuria  MSK: Denies arthralgias  SKIN: Denies rash   NEURO: Denies paresthesias, weakness  PSYCH: Denies depression    VITALS:  T(F): 97.2, Max: 98.5 (01-23-23 @ 05:00)  HR: 67  BP: 135/60  RR: 18Vital Signs Last 24 Hrs  T(C): 36.2 (23 Jan 2023 07:56), Max: 36.9 (23 Jan 2023 05:00)  T(F): 97.2 (23 Jan 2023 07:56), Max: 98.5 (23 Jan 2023 05:00)  HR: 67 (23 Jan 2023 07:56) (67 - 94)  BP: 135/60 (23 Jan 2023 07:56) (135/60 - 152/66)  BP(mean): --  RR: 18 (23 Jan 2023 07:56) (18 - 18)  SpO2: 97% (23 Jan 2023 07:56) (96% - 97%)    Parameters below as of 23 Jan 2023 07:56  Patient On (Oxygen Delivery Method): room air        PHYSICAL EXAM:  Gen: NAD, resting in bed  HEENT: Normocephalic, atraumatic  Neck: supple, no lymphadenopathy  CV: Regular rate & regular rhythm  Lungs: decreased BS at bases, no fremitus  Abdomen: Soft, BS present  Ext: Warm, well perfused  Neuro: non focal, awake  Skin: no rash, no erythema    TESTS & MEASUREMENTS:                        9.4    9.70  )-----------( 533      ( 23 Jan 2023 06:31 )             32.1     01-23    141  |  106  |  14  ----------------------------<  150<H>  4.2   |  23  |  0.8    Ca    9.5      23 Jan 2023 06:31  Mg     2.1     01-23    TPro  6.5  /  Alb  4.0  /  TBili  <0.2  /  DBili  x   /  AST  15  /  ALT  14  /  AlkPhos  77  01-23      LIVER FUNCTIONS - ( 23 Jan 2023 06:31 )  Alb: 4.0 g/dL / Pro: 6.5 g/dL / ALK PHOS: 77 U/L / ALT: 14 U/L / AST: 15 U/L / GGT: x                 Blood Gas Venous - Lactate: 0.90 mmol/L (01-22-23 @ 21:28)      INFECTIOUS DISEASES TESTING      RADIOLOGY & ADDITIONAL TESTS:  I have personally reviewed the last Chest xray  CXR      CT      CARDIOLOGY TESTING  12 Lead ECG:   Ventricular Rate 87 BPM    Atrial Rate 87 BPM    P-R Interval 168 ms    QRS Duration 82 ms    Q-T Interval 374 ms    QTC Calculation(Bazett) 450 ms    P Axis 67 degrees    R Axis 76 degrees    T Axis 44 degrees    Diagnosis Line Normal sinus rhythm  T wave abnormality, consider anterior ischemia  Prolonged QT  Abnormal ECG    Confirmed by Indiana Huitron MD ([id number]) on 1/23/2023 7:31:54 AM (01-23-23 @ 04:32)      MEDICATIONS  albuterol/ipratropium for Nebulization 3  ALPRAZolam 1  aspirin enteric coated 81  atorvastatin 40  budesonide 160 MICROgram(s)/formoterol 4.5 MICROgram(s) Inhaler 2  carvedilol 12.5  cefepime   IVPB   cefepime   IVPB 2000  clopidogrel Tablet 75  dextrose 5%. 1000  dextrose 50% Injectable 25  enoxaparin Injectable 40  gabapentin 600  glucagon  Injectable 1  insulin glargine Injectable (LANTUS) 19  insulin lispro (ADMELOG) corrective regimen sliding scale   losartan 25  oxybutynin 5  pantoprazole    Tablet 40  risperiDONE   Tablet 1  sertraline 100  tiotropium 2.5 MICROgram(s) Inhaler 2  vancomycin  IVPB 1000      ANTIBIOTICS:  cefepime   IVPB      cefepime   IVPB 2000 milliGRAM(s) IV Intermittent every 12 hours  vancomycin  IVPB 1000 milliGRAM(s) IV Intermittent every 12 hours      All available historical data has been reviewed

## 2023-01-23 NOTE — CONSULT NOTE ADULT - ASSESSMENT
IMPRESSION:    Community acquired pneumonia  HO COPD  HO CAD s/p PCI    RECOMMENDATIONS:    On room air, afebrile, no leucocytosis  Cover empirically for community acquired pneumonia with azithro and rocephin.   Get procalcitonin, follow blood cultures, send sputum gram stain and culture, Urine streptococcal and Legionella Ag.  Continue home inhalers   Nebs q 6 and PRN  LE Duplex  HOB at 45, aspiration precautions  DVT ppx  Ambulate as tolerates / PT / OT  Off loading and preventive measures to avoid pressure ulcers IMPRESSION:    Community acquired pneumonia  HO COPD  HO CAD s/p PCI    RECOMMENDATIONS:    On room air, afebrile, no leucocytosis  Cover empirically for community acquired pneumonia with azithro and rocephin.   Get procalcitonin, MRSA nares, follow blood cultures, send sputum gram stain and culture, Urine streptococcal and Legionella Ag.  Continue home inhalers   Nebs q 6 and PRN  LE Duplex  HOB at 45, aspiration precautions  DVT ppx  Ambulate as tolerates / PT / OT  Off loading and preventive measures to avoid pressure ulcers IMPRESSION:    Community acquired pneumonia  HO COPD  HO CAD s/p PCI    RECOMMENDATIONS:    On room air, afebrile, no leucocytosis  Cover empirically for community acquired pneumonia with azithro and rocephin.   Get procalcitonin, MRSA nares, follow blood cultures, send sputum gram stain and culture, Urine streptococcal and Legionella Ag.  Continue home inhalers   Nebs q 6 and PRN  Prednisone 40 mg for 5 days  Get CT chest non contrast  LE Duplex  HOB at 45, aspiration precautions  DVT ppx  Ambulate as tolerates / PT  Check Ambulatory pulse ox before discharge IMPRESSION:    Acute hypoxemic resp failure  PNA ( bl airspace disease)  Anemia  COPD exacerbaiton  HO COPD  HO CAD s/p PCI    RECOMMENDATIONS:    chest ct  Get procalcitonin, MRSA nares, follow blood cultures, send sputum gram stain and culture, Urine streptococcal and Legionella Ag.  Continue home inhalers   Nebs q 6 and PRN  Prednisone 40 mg for 5 days  LE Duplex  HOB at 45, aspiration precautions  DVT ppx  Ambulate as tolerates / PT  Check Ambulatory pulse ox before discharge

## 2023-01-23 NOTE — H&P ADULT - NSICDXPASTMEDICALHX_GEN_ALL_CORE_FT
PAST MEDICAL HISTORY:  CAD (coronary artery disease)     COPD (chronic obstructive pulmonary disease)     DM (diabetes mellitus)     HLD (hyperlipidemia)     HTN (hypertension)     Neuropathy

## 2023-01-23 NOTE — CHART NOTE - NSCHARTNOTEFT_GEN_A_CORE
Pt seen and examined. Pt evaluated by overnight nocturnist. Currently being treated for acute hypoxic respiratory failure due to suspected GNR PNA. Pulmonary and ID eval noted. Will cont IV vancomycin and cefepime. PO prednisone 40mg x5d. CT Chest ordered. f/u atypical pna and sepsis workup.

## 2023-01-23 NOTE — H&P ADULT - NSHPPHYSICALEXAM_GEN_ALL_CORE
GENERAL: NAD, lying in bed comfortably  HEAD:  Atraumatic, Normocephalic  EYES: EOMI, PERRLA, conjunctiva and sclera clear  ENT: Moist mucous membranes  NECK: Supple, No JVD  CHEST/LUNG: Clear to auscultation bilaterally; No rales, rhonchi, wheezing, or rubs. Unlabored respirations  HEART: Regular rate and rhythm; No murmurs, rubs, or gallops  ABDOMEN: BSx4; Soft, nontender, nondistended  EXTREMITIES:  2+ Peripheral Pulses, brisk capillary refill. No clubbing, cyanosis, or edema  NERVOUS SYSTEM:  A&Ox3, no focal deficits   SKIN: No rashes or lesions GENERAL: NAD, lying in bed comfortably  HEAD:  Atraumatic, Normocephalic  EYES: EOMI, PERRLA, conjunctiva and sclera clear  ENT: Moist mucous membranes  NECK: Supple, No JVD  CHEST/LUNG: decreased air entry and bibasilar crackles bilaterally  HEART: Regular rate and rhythm; No murmurs, rubs, or gallops  ABDOMEN: BSx4; Soft, nontender, nondistended  EXTREMITIES:  2+ Peripheral Pulses, brisk capillary refill. No clubbing, cyanosis, or edema  NERVOUS SYSTEM:  A&Ox3, no focal deficits

## 2023-01-23 NOTE — CONSULT NOTE ADULT - SUBJECTIVE AND OBJECTIVE BOX
Patient is a 82y old  Female who presents with a chief complaint of     HPI:  83 yo female Pmhx Copd not home O2, Cad with stent x 1 2019, Dm, Htn presenting for SOB. history goes back to 1 week ago when patient started complaining of sudden onset SOB, mostly on exertion associated with productive cough of whitish sputum. she reported chills but denied fever, sore throat, rhinorrhea, congestion, headache, myalgia, sick contacts, chest pain, abdominal pain, N/V, diarrhea or dysuria. patient mentioned yesterday she had an episode of midsternal burning chest pain, non radiating, lasted for around 5 mins, not related to exertions, worsened with chest palpation and resolved by itself. she also mentioned that her SOB worsened so she decided to present to the ED.      VITALS:   T(C): 36.7 (01-22-23 @ 19:37), Max: 36.7 (01-22-23 @ 19:37)  HR: 92 (01-22-23 @ 19:37) (92 - 92)  BP: 152/66 (01-22-23 @ 19:37) (152/66 - 152/66)  RR: 18 (01-22-23 @ 19:37) (18 - 18)  SpO2: 97% (01-22-23 @ 19:37) (97% - 97%)    in ED, vitals and labs were WNLs. patient received solumedrol 125 mg IV, ceftriaxone/azithromycin and admitted to tele for further management (23 Jan 2023 01:11)      PAST MEDICAL & SURGICAL HISTORY:  DM (diabetes mellitus)      CAD (coronary artery disease)      HLD (hyperlipidemia)      HTN (hypertension)      Neuropathy      COPD (chronic obstructive pulmonary disease)          SOCIAL HX:   Smoking                         ETOH                            Other    FAMILY HISTORY:  .  No cardiovascular or pulmonary family history     REVIEW OF SYSTEMS:    All ROS are negative exept per HPI       Allergies    No Known Allergies    Intolerances          PHYSICAL EXAM  Vital Signs Last 24 Hrs  T(C): 36.2 (23 Jan 2023 07:56), Max: 36.9 (23 Jan 2023 05:00)  T(F): 97.2 (23 Jan 2023 07:56), Max: 98.5 (23 Jan 2023 05:00)  HR: 67 (23 Jan 2023 07:56) (67 - 94)  BP: 135/60 (23 Jan 2023 07:56) (135/60 - 152/66)  BP(mean): --  RR: 18 (23 Jan 2023 07:56) (18 - 18)  SpO2: 97% (23 Jan 2023 07:56) (96% - 97%)    Parameters below as of 23 Jan 2023 07:56  Patient On (Oxygen Delivery Method): room air        CONSTITUTIONAL:  In NAD    ENT:   Airway patent,     EYES:   Clear bilaterally,   pupils equal,   round and reactive to light.    CARDIAC:   Normal rate,   regular rhythm.    no edema    RESPIRATORY:   No wheezing   Normal chest expansion  Not tachypneic,  No use of accessory muscles    GASTROINTESTINAL:  Abdomen soft, non-tender,   No guarding,   Positive BS    MUSCULOSKELETAL:   No clubbing, cyanosis    NEUROLOGICAL:   Alert and oriented     SKIN:   Skin normal color for race,             LABS:                          9.4    9.70  )-----------( 533      ( 23 Jan 2023 06:31 )             32.1                                               01-23    141  |  106  |  14  ----------------------------<  150<H>  4.2   |  23  |  0.8    Ca    9.5      23 Jan 2023 06:31  Mg     2.1     01-23    TPro  6.5  /  Alb  4.0  /  TBili  <0.2  /  DBili  x   /  AST  15  /  ALT  14  /  AlkPhos  77  01-23                                                 CARDIAC MARKERS ( 23 Jan 2023 06:31 )  x     / <0.01 ng/mL / x     / x     / x      CARDIAC MARKERS ( 22 Jan 2023 21:36 )  x     / <0.01 ng/mL / x     / x     / x                                                LIVER FUNCTIONS - ( 23 Jan 2023 06:31 )  Alb: 4.0 g/dL / Pro: 6.5 g/dL / ALK PHOS: 77 U/L / ALT: 14 U/L / AST: 15 U/L / GGT: x                                                                                                MEDICATIONS  (STANDING):  albuterol/ipratropium for Nebulization 3 milliLiter(s) Nebulizer every 6 hours  ALPRAZolam 1 milliGRAM(s) Oral two times a day  aspirin enteric coated 81 milliGRAM(s) Oral daily  atorvastatin 40 milliGRAM(s) Oral at bedtime  budesonide 160 MICROgram(s)/formoterol 4.5 MICROgram(s) Inhaler 2 Puff(s) Inhalation two times a day  carvedilol 12.5 milliGRAM(s) Oral every 12 hours  cefepime   IVPB      cefepime   IVPB 2000 milliGRAM(s) IV Intermittent every 12 hours  clopidogrel Tablet 75 milliGRAM(s) Oral daily  dextrose 5%. 1000 milliLiter(s) (50 mL/Hr) IV Continuous <Continuous>  dextrose 50% Injectable 25 Gram(s) IV Push once  enoxaparin Injectable 40 milliGRAM(s) SubCutaneous every 24 hours  gabapentin 600 milliGRAM(s) Oral two times a day  glucagon  Injectable 1 milliGRAM(s) IntraMuscular once  insulin glargine Injectable (LANTUS) 19 Unit(s) SubCutaneous at bedtime  insulin lispro (ADMELOG) corrective regimen sliding scale   SubCutaneous three times a day before meals  losartan 25 milliGRAM(s) Oral daily  oxybutynin 5 milliGRAM(s) Oral two times a day  pantoprazole    Tablet 40 milliGRAM(s) Oral before breakfast  risperiDONE   Tablet 1 milliGRAM(s) Oral at bedtime  sertraline 100 milliGRAM(s) Oral daily  tiotropium 2.5 MICROgram(s) Inhaler 2 Puff(s) Inhalation daily  vancomycin  IVPB 1000 milliGRAM(s) IV Intermittent every 12 hours    MEDICATIONS  (PRN):  acetaminophen     Tablet .. 650 milliGRAM(s) Oral every 6 hours PRN Temp greater or equal to 38C (100.4F), Mild Pain (1 - 3)  aluminum hydroxide/magnesium hydroxide/simethicone Suspension 30 milliLiter(s) Oral every 4 hours PRN Dyspepsia  dextrose Oral Gel 15 Gram(s) Oral once PRN Blood Glucose LESS THAN 70 milliGRAM(s)/deciliter  dicyclomine 20 milliGRAM(s) Oral three times a day before meals PRN abdominal pain  melatonin 3 milliGRAM(s) Oral at bedtime PRN Insomnia  ondansetron Injectable 4 milliGRAM(s) IV Push every 8 hours PRN Nausea and/or Vomiting     Patient is a 82y old  Female who presents with a chief complaint of sob    HPI:  81 yo female Pmhx Copd not home O2, Cad with stent x 1 2019, Dm, Htn presenting for SOB. history goes back to 1 week ago when patient started complaining of sudden onset SOB, mostly on exertion associated with productive cough of whitish sputum. she reported chills but denied fever, sore throat, rhinorrhea, congestion, headache, myalgia, sick contacts, chest pain, abdominal pain, N/V, diarrhea or dysuria. patient mentioned yesterday she had an episode of midsternal burning chest pain, non radiating, lasted for around 5 mins, not related to exertions, worsened with chest palpation and resolved by itself. she also mentioned that her SOB worsened so she decided to present to the ED.      VITALS:   T(C): 36.7 (01-22-23 @ 19:37), Max: 36.7 (01-22-23 @ 19:37)  HR: 92 (01-22-23 @ 19:37) (92 - 92)  BP: 152/66 (01-22-23 @ 19:37) (152/66 - 152/66)  RR: 18 (01-22-23 @ 19:37) (18 - 18)  SpO2: 97% (01-22-23 @ 19:37) (97% - 97%)    in ED, vitals and labs were WNLs. patient received solumedrol 125 mg IV, ceftriaxone/azithromycin and admitted to tele for further management, called to evaluate do not follow pul      PAST MEDICAL & SURGICAL HISTORY:  DM (diabetes mellitus)      CAD (coronary artery disease)      HLD (hyperlipidemia)      HTN (hypertension)      Neuropathy      COPD (chronic obstructive pulmonary disease)          SOCIAL HX:   Smoking    -    FAMILY HISTORY:  .  No cardiovascular or pulmonary family history     REVIEW OF SYSTEMS:    All ROS are negative exept per HPI       Allergies    No Known Allergies    Intolerances          PHYSICAL EXAM  Vital Signs Last 24 Hrs  T(C): 36.2 (23 Jan 2023 07:56), Max: 36.9 (23 Jan 2023 05:00)  T(F): 97.2 (23 Jan 2023 07:56), Max: 98.5 (23 Jan 2023 05:00)  HR: 67 (23 Jan 2023 07:56) (67 - 94)  BP: 135/60 (23 Jan 2023 07:56) (135/60 - 152/66)  BP(mean): --  RR: 18 (23 Jan 2023 07:56) (18 - 18)  SpO2: 97% (23 Jan 2023 07:56) (96% - 97%)    Parameters below as of 23 Jan 2023 07:56  Patient On (Oxygen Delivery Method): room air        CONSTITUTIONAL:  In NAD    ENT:   Airway patent,     EYES:   Clear bilaterally,   pupils equal,   round and reactive to light.    CARDIAC:   BRIA 2.6    RESPIRATORY:   BL CRACKLES    GASTROINTESTINAL:  Abdomen soft, non-tender,   No guarding,   Positive BS    MUSCULOSKELETAL:   No clubbing, cyanosis    NEUROLOGICAL:   Alert and oriented     SKIN:   Skin normal color for race,             LABS:                          9.4    9.70  )-----------( 533      ( 23 Jan 2023 06:31 )             32.1                                               01-23    141  |  106  |  14  ----------------------------<  150<H>  4.2   |  23  |  0.8    Ca    9.5      23 Jan 2023 06:31  Mg     2.1     01-23    TPro  6.5  /  Alb  4.0  /  TBili  <0.2  /  DBili  x   /  AST  15  /  ALT  14  /  AlkPhos  77  01-23                                                 CARDIAC MARKERS ( 23 Jan 2023 06:31 )  x     / <0.01 ng/mL / x     / x     / x      CARDIAC MARKERS ( 22 Jan 2023 21:36 )  x     / <0.01 ng/mL / x     / x     / x                                                LIVER FUNCTIONS - ( 23 Jan 2023 06:31 )  Alb: 4.0 g/dL / Pro: 6.5 g/dL / ALK PHOS: 77 U/L / ALT: 14 U/L / AST: 15 U/L / GGT: x                                                                                                MEDICATIONS  (STANDING):  albuterol/ipratropium for Nebulization 3 milliLiter(s) Nebulizer every 6 hours  ALPRAZolam 1 milliGRAM(s) Oral two times a day  aspirin enteric coated 81 milliGRAM(s) Oral daily  atorvastatin 40 milliGRAM(s) Oral at bedtime  budesonide 160 MICROgram(s)/formoterol 4.5 MICROgram(s) Inhaler 2 Puff(s) Inhalation two times a day  carvedilol 12.5 milliGRAM(s) Oral every 12 hours  cefepime   IVPB      cefepime   IVPB 2000 milliGRAM(s) IV Intermittent every 12 hours  clopidogrel Tablet 75 milliGRAM(s) Oral daily  dextrose 5%. 1000 milliLiter(s) (50 mL/Hr) IV Continuous <Continuous>  dextrose 50% Injectable 25 Gram(s) IV Push once  enoxaparin Injectable 40 milliGRAM(s) SubCutaneous every 24 hours  gabapentin 600 milliGRAM(s) Oral two times a day  glucagon  Injectable 1 milliGRAM(s) IntraMuscular once  insulin glargine Injectable (LANTUS) 19 Unit(s) SubCutaneous at bedtime  insulin lispro (ADMELOG) corrective regimen sliding scale   SubCutaneous three times a day before meals  losartan 25 milliGRAM(s) Oral daily  oxybutynin 5 milliGRAM(s) Oral two times a day  pantoprazole    Tablet 40 milliGRAM(s) Oral before breakfast  risperiDONE   Tablet 1 milliGRAM(s) Oral at bedtime  sertraline 100 milliGRAM(s) Oral daily  tiotropium 2.5 MICROgram(s) Inhaler 2 Puff(s) Inhalation daily  vancomycin  IVPB 1000 milliGRAM(s) IV Intermittent every 12 hours    MEDICATIONS  (PRN):  acetaminophen     Tablet .. 650 milliGRAM(s) Oral every 6 hours PRN Temp greater or equal to 38C (100.4F), Mild Pain (1 - 3)  aluminum hydroxide/magnesium hydroxide/simethicone Suspension 30 milliLiter(s) Oral every 4 hours PRN Dyspepsia  dextrose Oral Gel 15 Gram(s) Oral once PRN Blood Glucose LESS THAN 70 milliGRAM(s)/deciliter  dicyclomine 20 milliGRAM(s) Oral three times a day before meals PRN abdominal pain  melatonin 3 milliGRAM(s) Oral at bedtime PRN Insomnia  ondansetron Injectable 4 milliGRAM(s) IV Push every 8 hours PRN Nausea and/or Vomiting

## 2023-01-23 NOTE — H&P ADULT - ATTENDING COMMENTS
Patient seen and examined at bedside independently of the residents. I read the resident's note and agree with the plan with the additions and corrections as noted below.    REVIEW OF SYSTEMS:  CONSTITUTIONAL: No weakness, fevers or chills  EYES/ENT: No visual changes;  No vertigo or throat pain   NECK: No pain or stiffness  RESPIRATORY: No wheezing, hemoptysis; + shortness of breath and cough.   CARDIOVASCULAR: No chest pain or palpitations  GASTROINTESTINAL: No abdominal or epigastric pain. No nausea, vomiting, or hematemesis; No diarrhea or constipation. No melena or hematochezia.  GENITOURINARY: No dysuria, frequency or hematuria  NEUROLOGICAL: No numbness or weakness  MSK: No pain. No weakness.   SKIN: No itching, rashes.     PMH: COPD (not on home O2), HTN, DM II and CAD s/p 1 stent in 2019    FHx: Reviewed. No fhx of asthma/copd, No fhx of liver and pulmonary disease. No fhx of hematological disorder.     Physical Exam:  GEN: No acute distress. Awake, Alert and oriented x 3.   Head: Atraumatic, Normocephalic.   Eye: PEERLA. No sclera icterus. EOMI.   ENT: Normal oropharynx, no thyromegaly, no mass, no lymphadenopathy.   LUNGS: b/l crackles lung fields.   HEART: Normal. S1/S2 present. RRR. No murmur/gallops.   ABD: Soft, non-tender, non-distended. Bowel sounds present.   EXT: No pitting edema. No erythema. No tenderness.  Integumentary: No rash, No sore, No petechia.   NEURO: CN III-XII intact. Strength: 5/5 b/l ULE. Sensory intact b/l ULE.     Vital Signs Last 24 Hrs  T(C): 36.7 (2023 19:37), Max: 36.7 (2023 19:37)  T(F): 98 (2023 19:37), Max: 98 (2023 19:37)  HR: 92 (2023 19:37) (92 - 92)  BP: 152/66 (2023 19:37) (152/66 - 152/66)  BP(mean): --  RR: 18 (2023 19:37) (18 - 18)  SpO2: 97% (2023 19:37) (97% - 97%)    Parameters below as of 2023 19:37  Patient On (Oxygen Delivery Method): nasal cannula  O2 Flow (L/min): 2L    Please see the above notes for Labs and radiology.     Assessment and Plan:     83 yo F with hx of COPD (not on home O2), HTN, DM II and CAD s/p 1 stent in 2019 presents to ED for SOB a/w productive cough.     AHRF likely 2/2 CAP   - CXR shows b/l patchy opacity, RLL consolidation.   - s/p azithro and ceftriaxone x 1 in ED.   - c/w doxy and ceftriaxone for now.   - check BCx, atypical PNA   - check sputum Cx.   - albuterol prn     Normocytic anemia   - Hb stable at 9.3.     CAD s/p PCI with 1 stent - on ASA, Plavix, Statin BB   HTN/HLD - on losartan and statin  DM II - monitor FS AC HS. Insulin regimen.     DVT ppx: Lovenox SC  GI ppx:  PPI  Diet: DASH  Activity: as tolerated.     Date seen by the attendin2022. Patient seen and examined at bedside independently of the residents. I read the resident's note and agree with the plan with the additions and corrections as noted below.    REVIEW OF SYSTEMS:  CONSTITUTIONAL: No weakness, fevers or chills  EYES/ENT: No visual changes;  No vertigo or throat pain   NECK: No pain or stiffness  RESPIRATORY: No wheezing, hemoptysis; + shortness of breath and cough.   CARDIOVASCULAR: No chest pain or palpitations  GASTROINTESTINAL: No abdominal or epigastric pain. No nausea, vomiting, or hematemesis; No diarrhea or constipation. No melena or hematochezia.  GENITOURINARY: No dysuria, frequency or hematuria  NEUROLOGICAL: No numbness or weakness  MSK: No pain. No weakness.   SKIN: No itching, rashes.     PMH: COPD (not on home O2), HTN, DM II and CAD s/p 1 stent in 2019    FHx: Reviewed. No fhx of asthma/copd, No fhx of liver and pulmonary disease. No fhx of hematological disorder.     Physical Exam:  GEN: No acute distress. Awake, Alert and oriented x 3.   Head: Atraumatic, Normocephalic.   Eye: PEERLA. No sclera icterus. EOMI.   ENT: Normal oropharynx, no thyromegaly, no mass, no lymphadenopathy.   LUNGS: b/l crackles lung fields.   HEART: Normal. S1/S2 present. RRR. No murmur/gallops.   ABD: Soft, non-tender, non-distended. Bowel sounds present.   EXT: No pitting edema. No erythema. No tenderness.  Integumentary: No rash, No sore, No petechia.   NEURO: CN III-XII intact. Strength: 5/5 b/l ULE. Sensory intact b/l ULE.     Vital Signs Last 24 Hrs  T(C): 36.7 (2023 19:37), Max: 36.7 (2023 19:37)  T(F): 98 (2023 19:37), Max: 98 (2023 19:37)  HR: 92 (2023 19:37) (92 - 92)  BP: 152/66 (2023 19:37) (152/66 - 152/66)  BP(mean): --  RR: 18 (2023 19:37) (18 - 18)  SpO2: 97% (2023 19:37) (97% - 97%)    Parameters below as of 2023 19:37  Patient On (Oxygen Delivery Method): nasal cannula  O2 Flow (L/min): 2L    Please see the above notes for Labs and radiology.     Assessment and Plan:     83 yo F with hx of COPD (not on home O2), HTN, DM II and CAD s/p 1 stent in 2019 presents to ED for SOB a/w productive cough.     AHRF likely 2/2 CAP   - CXR shows b/l patchy opacity, RLL consolidation.   - s/p azithro and ceftriaxone x 1 in ED.   - patient has + MRSA in sputum previously. Will start on Vanco and cefepime for now.   - check BCx, atypical PNA   - check sputum Cx.   - albuterol prn   - ID consult.     COPD - not wheezing on physical exam. Albuterol q6h and q4h prn.     Normocytic anemia   - Hb stable at 9.3.     CAD s/p PCI with 1 stent - on ASA, Plavix, Statin BB   HTN/HLD - on losartan and statin  DM II - monitor FS AC HS. Insulin regimen.     DVT ppx: Lovenox SC  GI ppx:  PPI  Diet: DASH  Activity: as tolerated.     Date seen by the attendin2022. Patient seen and examined at bedside independently of the residents. I read the resident's note and agree with the plan with the additions and corrections as noted below.    REVIEW OF SYSTEMS:  CONSTITUTIONAL: No weakness, fevers or chills  EYES/ENT: No visual changes;  No vertigo or throat pain   NECK: No pain or stiffness  RESPIRATORY: No wheezing, hemoptysis; + shortness of breath and cough.   CARDIOVASCULAR: No chest pain or palpitations  GASTROINTESTINAL: No abdominal or epigastric pain. No nausea, vomiting, or hematemesis; No diarrhea or constipation. No melena or hematochezia.  GENITOURINARY: No dysuria, frequency or hematuria  NEUROLOGICAL: No numbness or weakness  MSK: No pain. No weakness.   SKIN: No itching, rashes.     PMH: COPD (not on home O2), HTN, DM II and CAD s/p 1 stent in 2019    FHx: Reviewed. No fhx of asthma/copd, No fhx of liver and pulmonary disease. No fhx of hematological disorder.     Physical Exam:  GEN: No acute distress. Awake, Alert and oriented x 3.   Head: Atraumatic, Normocephalic.   Eye: PEERLA. No sclera icterus. EOMI.   ENT: Normal oropharynx, no thyromegaly, no mass, no lymphadenopathy.   LUNGS: b/l crackles lung fields.   HEART: Normal. S1/S2 present. RRR. No murmur/gallops.   ABD: Soft, non-tender, non-distended. Bowel sounds present.   EXT: No pitting edema. No erythema. No tenderness.  Integumentary: No rash, No sore, No petechia.   NEURO: CN III-XII intact. Strength: 5/5 b/l ULE. Sensory intact b/l ULE.     Vital Signs Last 24 Hrs  T(C): 36.7 (2023 19:37), Max: 36.7 (2023 19:37)  T(F): 98 (2023 19:37), Max: 98 (2023 19:37)  HR: 92 (2023 19:37) (92 - 92)  BP: 152/66 (2023 19:37) (152/66 - 152/66)  BP(mean): --  RR: 18 (2023 19:37) (18 - 18)  SpO2: 97% (2023 19:37) (97% - 97%)    Parameters below as of 2023 19:37  Patient On (Oxygen Delivery Method): nasal cannula  O2 Flow (L/min): 2L    Please see the above notes for Labs and radiology.     Assessment and Plan:     83 yo F with hx of COPD (not on home O2), HTN, DM II and CAD s/p 1 stent in 2019 presents to ED for SOB a/w productive cough.     AHRF likely 2/2 CAP   - CXR shows b/l patchy opacity, RLL consolidation.   - s/p azithro and ceftriaxone x 1 in ED.   - patient has + MRSA in sputum previously. Will start on Vanco and cefepime for now.   - check BCx, atypical PNA   - check sputum Cx.   - albuterol prn   - ID consult.     COPD - not wheezing on physical exam. Albuterol q6h and q4h prn.     Normocytic anemia   - Hb stable at 9.3.     CAD s/p PCI with 1 stent - on ASA, Plavix, Statin BB   HTN/HLD - on losartan and statin  DM II - monitor FS AC HS. Insulin regimen.     DVT ppx: Lovenox SC  GI ppx:  PPI  Diet: DASH  Activity: as tolerated.   Code: DNR/DNI.     Date seen by the attendin2022.

## 2023-01-23 NOTE — H&P ADULT - NSHPLABSRESULTS_GEN_ALL_CORE
LABS:                          9.3    10.76 )-----------( 573      ( 22 Jan 2023 21:36 )             32.9     01-22    143  |  109  |  16  ----------------------------<  99  4.8   |  23  |  0.9    Ca    9.2      22 Jan 2023 21:36  Mg     2.0     01-22    TPro  6.3  /  Alb  3.7  /  TBili  <0.2  /  DBili  x   /  AST  47<H>  /  ALT  17  /  AlkPhos  72  01-22    LIVER FUNCTIONS - ( 22 Jan 2023 21:36 )  Alb: 3.7 g/dL / Pro: 6.3 g/dL / ALK PHOS: 72 U/L / ALT: 17 U/L / AST: 47 U/L / GGT: x

## 2023-01-24 LAB
ALBUMIN SERPL ELPH-MCNC: 3.6 G/DL — SIGNIFICANT CHANGE UP (ref 3.5–5.2)
ALP SERPL-CCNC: 63 U/L — SIGNIFICANT CHANGE UP (ref 30–115)
ALT FLD-CCNC: 12 U/L — SIGNIFICANT CHANGE UP (ref 0–41)
ANION GAP SERPL CALC-SCNC: 9 MMOL/L — SIGNIFICANT CHANGE UP (ref 7–14)
APPEARANCE UR: CLEAR — SIGNIFICANT CHANGE UP
AST SERPL-CCNC: 15 U/L — SIGNIFICANT CHANGE UP (ref 0–41)
BACTERIA # UR AUTO: NEGATIVE — SIGNIFICANT CHANGE UP
BASOPHILS # BLD AUTO: 0.01 K/UL — SIGNIFICANT CHANGE UP (ref 0–0.2)
BASOPHILS NFR BLD AUTO: 0.1 % — SIGNIFICANT CHANGE UP (ref 0–1)
BILIRUB SERPL-MCNC: <0.2 MG/DL — SIGNIFICANT CHANGE UP (ref 0.2–1.2)
BILIRUB UR-MCNC: NEGATIVE — SIGNIFICANT CHANGE UP
BUN SERPL-MCNC: 19 MG/DL — SIGNIFICANT CHANGE UP (ref 10–20)
CALCIUM SERPL-MCNC: 9 MG/DL — SIGNIFICANT CHANGE UP (ref 8.4–10.5)
CHLORIDE SERPL-SCNC: 108 MMOL/L — SIGNIFICANT CHANGE UP (ref 98–110)
CO2 SERPL-SCNC: 23 MMOL/L — SIGNIFICANT CHANGE UP (ref 17–32)
COLOR SPEC: SIGNIFICANT CHANGE UP
CREAT SERPL-MCNC: 0.9 MG/DL — SIGNIFICANT CHANGE UP (ref 0.7–1.5)
DIFF PNL FLD: NEGATIVE — SIGNIFICANT CHANGE UP
EGFR: 64 ML/MIN/1.73M2 — SIGNIFICANT CHANGE UP
EOSINOPHIL # BLD AUTO: 0.06 K/UL — SIGNIFICANT CHANGE UP (ref 0–0.7)
EOSINOPHIL NFR BLD AUTO: 0.5 % — SIGNIFICANT CHANGE UP (ref 0–8)
EPI CELLS # UR: 7 /HPF — HIGH (ref 0–5)
GLUCOSE BLDC GLUCOMTR-MCNC: 102 MG/DL — HIGH (ref 70–99)
GLUCOSE BLDC GLUCOMTR-MCNC: 149 MG/DL — HIGH (ref 70–99)
GLUCOSE BLDC GLUCOMTR-MCNC: 152 MG/DL — HIGH (ref 70–99)
GLUCOSE BLDC GLUCOMTR-MCNC: 162 MG/DL — HIGH (ref 70–99)
GLUCOSE SERPL-MCNC: 123 MG/DL — HIGH (ref 70–99)
GLUCOSE UR QL: NEGATIVE — SIGNIFICANT CHANGE UP
HCT VFR BLD CALC: 32.1 % — LOW (ref 37–47)
HGB BLD-MCNC: 9.1 G/DL — LOW (ref 12–16)
HYALINE CASTS # UR AUTO: 2 /LPF — SIGNIFICANT CHANGE UP (ref 0–7)
IMM GRANULOCYTES NFR BLD AUTO: 0.5 % — HIGH (ref 0.1–0.3)
KETONES UR-MCNC: SIGNIFICANT CHANGE UP
LEGIONELLA AG UR QL: NEGATIVE — SIGNIFICANT CHANGE UP
LEUKOCYTE ESTERASE UR-ACNC: ABNORMAL
LYMPHOCYTES # BLD AUTO: 1.34 K/UL — SIGNIFICANT CHANGE UP (ref 1.2–3.4)
LYMPHOCYTES # BLD AUTO: 11.4 % — LOW (ref 20.5–51.1)
MAGNESIUM SERPL-MCNC: 2.2 MG/DL — SIGNIFICANT CHANGE UP (ref 1.8–2.4)
MCHC RBC-ENTMCNC: 23.5 PG — LOW (ref 27–31)
MCHC RBC-ENTMCNC: 28.3 G/DL — LOW (ref 32–37)
MCV RBC AUTO: 82.9 FL — SIGNIFICANT CHANGE UP (ref 81–99)
MONOCYTES # BLD AUTO: 0.85 K/UL — HIGH (ref 0.1–0.6)
MONOCYTES NFR BLD AUTO: 7.2 % — SIGNIFICANT CHANGE UP (ref 1.7–9.3)
NEUTROPHILS # BLD AUTO: 9.48 K/UL — HIGH (ref 1.4–6.5)
NEUTROPHILS NFR BLD AUTO: 80.3 % — HIGH (ref 42.2–75.2)
NITRITE UR-MCNC: NEGATIVE — SIGNIFICANT CHANGE UP
NRBC # BLD: 0 /100 WBCS — SIGNIFICANT CHANGE UP (ref 0–0)
PH UR: 6.5 — SIGNIFICANT CHANGE UP (ref 5–8)
PHOSPHATE SERPL-MCNC: 3.4 MG/DL — SIGNIFICANT CHANGE UP (ref 2.1–4.9)
PLATELET # BLD AUTO: 581 K/UL — HIGH (ref 130–400)
POTASSIUM SERPL-MCNC: 4.4 MMOL/L — SIGNIFICANT CHANGE UP (ref 3.5–5)
POTASSIUM SERPL-SCNC: 4.4 MMOL/L — SIGNIFICANT CHANGE UP (ref 3.5–5)
PROT SERPL-MCNC: 5.6 G/DL — LOW (ref 6–8)
PROT UR-MCNC: SIGNIFICANT CHANGE UP
RBC # BLD: 3.87 M/UL — LOW (ref 4.2–5.4)
RBC # FLD: 19.3 % — HIGH (ref 11.5–14.5)
RBC CASTS # UR COMP ASSIST: 2 /HPF — SIGNIFICANT CHANGE UP (ref 0–4)
SODIUM SERPL-SCNC: 140 MMOL/L — SIGNIFICANT CHANGE UP (ref 135–146)
SP GR SPEC: 1.01 — SIGNIFICANT CHANGE UP (ref 1.01–1.03)
UROBILINOGEN FLD QL: SIGNIFICANT CHANGE UP
VANCOMYCIN FLD-MCNC: 20.2 UG/ML — HIGH (ref 5–10)
VANCOMYCIN TROUGH SERPL-MCNC: 20.2 UG/ML — HIGH (ref 5–10)
WBC # BLD: 11.8 K/UL — HIGH (ref 4.8–10.8)
WBC # FLD AUTO: 11.8 K/UL — HIGH (ref 4.8–10.8)
WBC UR QL: 3 /HPF — SIGNIFICANT CHANGE UP (ref 0–5)

## 2023-01-24 PROCEDURE — 99233 SBSQ HOSP IP/OBS HIGH 50: CPT

## 2023-01-24 RX ORDER — VANCOMYCIN HCL 1 G
1000 VIAL (EA) INTRAVENOUS EVERY 24 HOURS
Refills: 0 | Status: DISCONTINUED | OUTPATIENT
Start: 2023-01-25 | End: 2023-01-25

## 2023-01-24 RX ADMIN — Medication 1200 MILLIGRAM(S): at 18:24

## 2023-01-24 RX ADMIN — Medication 1 MILLIGRAM(S): at 18:27

## 2023-01-24 RX ADMIN — GABAPENTIN 600 MILLIGRAM(S): 400 CAPSULE ORAL at 05:23

## 2023-01-24 RX ADMIN — Medication 40 MILLIGRAM(S): at 05:22

## 2023-01-24 RX ADMIN — RISPERIDONE 1 MILLIGRAM(S): 4 TABLET ORAL at 22:19

## 2023-01-24 RX ADMIN — Medication 3 MILLILITER(S): at 02:36

## 2023-01-24 RX ADMIN — SERTRALINE 100 MILLIGRAM(S): 25 TABLET, FILM COATED ORAL at 12:46

## 2023-01-24 RX ADMIN — Medication 81 MILLIGRAM(S): at 12:46

## 2023-01-24 RX ADMIN — Medication 2: at 18:22

## 2023-01-24 RX ADMIN — CEFEPIME 100 MILLIGRAM(S): 1 INJECTION, POWDER, FOR SOLUTION INTRAMUSCULAR; INTRAVENOUS at 09:11

## 2023-01-24 RX ADMIN — Medication 3 MILLILITER(S): at 20:16

## 2023-01-24 RX ADMIN — LOSARTAN POTASSIUM 25 MILLIGRAM(S): 100 TABLET, FILM COATED ORAL at 05:23

## 2023-01-24 RX ADMIN — Medication 250 MILLIGRAM(S): at 05:32

## 2023-01-24 RX ADMIN — CEFEPIME 100 MILLIGRAM(S): 1 INJECTION, POWDER, FOR SOLUTION INTRAMUSCULAR; INTRAVENOUS at 18:26

## 2023-01-24 RX ADMIN — Medication 5 MILLIGRAM(S): at 18:24

## 2023-01-24 RX ADMIN — INSULIN GLARGINE 19 UNIT(S): 100 INJECTION, SOLUTION SUBCUTANEOUS at 22:19

## 2023-01-24 RX ADMIN — PANTOPRAZOLE SODIUM 40 MILLIGRAM(S): 20 TABLET, DELAYED RELEASE ORAL at 05:29

## 2023-01-24 RX ADMIN — CLOPIDOGREL BISULFATE 75 MILLIGRAM(S): 75 TABLET, FILM COATED ORAL at 12:46

## 2023-01-24 RX ADMIN — Medication 1200 MILLIGRAM(S): at 05:24

## 2023-01-24 RX ADMIN — CARVEDILOL PHOSPHATE 12.5 MILLIGRAM(S): 80 CAPSULE, EXTENDED RELEASE ORAL at 18:24

## 2023-01-24 RX ADMIN — CARVEDILOL PHOSPHATE 12.5 MILLIGRAM(S): 80 CAPSULE, EXTENDED RELEASE ORAL at 05:24

## 2023-01-24 RX ADMIN — Medication 5 MILLIGRAM(S): at 05:23

## 2023-01-24 RX ADMIN — Medication 2: at 07:56

## 2023-01-24 RX ADMIN — GABAPENTIN 600 MILLIGRAM(S): 400 CAPSULE ORAL at 18:25

## 2023-01-24 RX ADMIN — CEFEPIME 100 MILLIGRAM(S): 1 INJECTION, POWDER, FOR SOLUTION INTRAMUSCULAR; INTRAVENOUS at 02:23

## 2023-01-24 RX ADMIN — ATORVASTATIN CALCIUM 40 MILLIGRAM(S): 80 TABLET, FILM COATED ORAL at 22:19

## 2023-01-24 RX ADMIN — ENOXAPARIN SODIUM 40 MILLIGRAM(S): 100 INJECTION SUBCUTANEOUS at 12:46

## 2023-01-24 RX ADMIN — Medication 1 MILLIGRAM(S): at 05:23

## 2023-01-24 RX ADMIN — Medication 3 MILLILITER(S): at 07:56

## 2023-01-24 NOTE — PATIENT PROFILE ADULT - NSPRESCRUSEDDRG_GEN_A_NUR
Mom states that she [place] with two [facility] due to being around numerous ppl who were flu positive. Mom states that pt was given medications for an ear infection but was negative for the flu mom states that pt is now running a fever with cough congestion sneezing and not feeling well.   
No

## 2023-01-24 NOTE — PROGRESS NOTE ADULT - SUBJECTIVE AND OBJECTIVE BOX
KIMBER BARBER  82y, Female  Allergy: No Known Allergies    Hospital Day: 1d    Patient seen and examined. No acute events overnight    PMH/PSH:  PAST MEDICAL & SURGICAL HISTORY:  DM (diabetes mellitus)      CAD (coronary artery disease)      HLD (hyperlipidemia)      HTN (hypertension)      Neuropathy      COPD (chronic obstructive pulmonary disease)          VITALS:  T(F): 97.2 (23 @ 05:27), Max: 97.2 (23 @ 22:14)  HR: 59 (23 @ 08:37)  BP: 127/62 (23 @ 08:37) (114/89 - 154/99)  RR: 20 (23 @ 08:37)  SpO2: 100% (23 @ 08:37)    TESTS & MEASUREMENTS:  Weight (Kg): 77.1 (23 @ 19:37)                            9.1    11.80 )-----------( 581      ( 2023 06:00 )             32.1           140  |  108  |  19  ----------------------------<  123<H>  4.4   |  23  |  0.9    Ca    9.0      2023 06:20  Phos  3.4       Mg     2.2         TPro  5.6<L>  /  Alb  3.6  /  TBili  <0.2  /  DBili  x   /  AST  15  /  ALT  12  /  AlkPhos  63      LIVER FUNCTIONS - ( 2023 06:20 )  Alb: 3.6 g/dL / Pro: 5.6 g/dL / ALK PHOS: 63 U/L / ALT: 12 U/L / AST: 15 U/L / GGT: x           CARDIAC MARKERS ( 2023 06:31 )  x     / <0.01 ng/mL / x     / x     / x      CARDIAC MARKERS ( 2023 21:36 )  x     / <0.01 ng/mL / x     / x     / x            Urinalysis Basic - ( 2023 10:15 )    Color: Light Yellow / Appearance: Clear / S.013 / pH: x  Gluc: x / Ketone: Trace  / Bili: Negative / Urobili: <2 mg/dL   Blood: x / Protein: Trace / Nitrite: Negative   Leuk Esterase: Small / RBC: 2 /HPF / WBC 3 /HPF   Sq Epi: x / Non Sq Epi: 7 /HPF / Bacteria: Negative        RADIOLOGY & ADDITIONAL TESTS:    RECENT DIAGNOSTIC ORDERS:  Vancomycin Level, Random: 16:00 (23 @ 13:56)  Vancomycin Level, Trough: 16:00  Addl Info: Please draw trough before vancomycin dose is given. (23 @ 17:59)  Magnesium, Serum: Repeat From: 2023 17:29 To: 2023 23:59, Every 1 day(s) (23 @ 17:29)  Phosphorus Level, Serum: Repeat From: 2023 17:29 To: 2023 23:59, Every 1 day(s) (23 @ 17:29)  Comprehensive Metabolic Panel: Repeat From: 2023 17:28 To: 2023 23:59, Every 1 day(s) (23 @ 17:29)  Complete Blood Count + Automated Diff: Repeat From: 2023 17:28 To: 2023 23:59, Every 1 day(s) (23 @ 17:29)      MEDICATIONS:  MEDICATIONS  (STANDING):  albuterol/ipratropium for Nebulization 3 milliLiter(s) Nebulizer every 6 hours  ALPRAZolam 1 milliGRAM(s) Oral two times a day  aspirin enteric coated 81 milliGRAM(s) Oral daily  atorvastatin 40 milliGRAM(s) Oral at bedtime  budesonide 160 MICROgram(s)/formoterol 4.5 MICROgram(s) Inhaler 2 Puff(s) Inhalation two times a day  carvedilol 12.5 milliGRAM(s) Oral every 12 hours  cefepime   IVPB 2000 milliGRAM(s) IV Intermittent every 8 hours  clopidogrel Tablet 75 milliGRAM(s) Oral daily  dextrose 5%. 1000 milliLiter(s) (50 mL/Hr) IV Continuous <Continuous>  dextrose 50% Injectable 25 Gram(s) IV Push once  enoxaparin Injectable 40 milliGRAM(s) SubCutaneous every 24 hours  gabapentin 600 milliGRAM(s) Oral two times a day  glucagon  Injectable 1 milliGRAM(s) IntraMuscular once  guaiFENesin ER 1200 milliGRAM(s) Oral every 12 hours  insulin glargine Injectable (LANTUS) 19 Unit(s) SubCutaneous at bedtime  insulin lispro (ADMELOG) corrective regimen sliding scale   SubCutaneous three times a day before meals  losartan 25 milliGRAM(s) Oral daily  oxybutynin 5 milliGRAM(s) Oral two times a day  pantoprazole    Tablet 40 milliGRAM(s) Oral before breakfast  predniSONE   Tablet 40 milliGRAM(s) Oral daily  risperiDONE   Tablet 1 milliGRAM(s) Oral at bedtime  sertraline 100 milliGRAM(s) Oral daily  tiotropium 2.5 MICROgram(s) Inhaler 2 Puff(s) Inhalation daily  vancomycin  IVPB 1000 milliGRAM(s) IV Intermittent every 12 hours    MEDICATIONS  (PRN):  acetaminophen     Tablet .. 650 milliGRAM(s) Oral every 6 hours PRN Temp greater or equal to 38C (100.4F), Mild Pain (1 - 3)  aluminum hydroxide/magnesium hydroxide/simethicone Suspension 30 milliLiter(s) Oral every 4 hours PRN Dyspepsia  dextrose Oral Gel 15 Gram(s) Oral once PRN Blood Glucose LESS THAN 70 milliGRAM(s)/deciliter  dicyclomine 20 milliGRAM(s) Oral three times a day before meals PRN abdominal pain  melatonin 3 milliGRAM(s) Oral at bedtime PRN Insomnia  ondansetron Injectable 4 milliGRAM(s) IV Push every 8 hours PRN Nausea and/or Vomiting      HOME MEDICATIONS:  albuterol 90 mcg/inh inhalation aerosol ()  aspirin 81 mg oral delayed release tablet ()  carvedilol 25 mg oral tablet ()  dicyclomine 20 mg oral tablet ()  gabapentin 600 mg oral tablet ()  losartan 25 mg oral tablet ()  pantoprazole 40 mg oral delayed release tablet ()  risperiDONE 1 mg oral tablet ()  solifenacin 5 mg oral tablet ()  Tradjenta 5 mg oral tablet ()  Trelegy Ellipta 200 mcg-62.5 mcg-25 mcg/inh inhalation powder ()  Xanax 1 mg oral tablet ()  Zoloft 100 mg oral tablet ()      REVIEW OF SYSTEMS:  All other review of systems is negative unless indicated above.     PHYSICAL EXAM:  PHYSICAL EXAM:  GENERAL: NAD, well-developed  HEAD:  Atraumatic, Normocephalic  NECK: Supple, No JVD  CHEST/LUNG: Clear to auscultation bilaterally; No wheeze  HEART: Regular rate and rhythm; No murmurs, rubs, or gallops  ABDOMEN: Soft, Nontender, Nondistended; Bowel sounds present  EXTREMITIES:  2+ Peripheral Pulses, No clubbing, cyanosis, or edema  SKIN: No rashes or lesions

## 2023-01-24 NOTE — PATIENT PROFILE ADULT - FALL HARM RISK - HARM RISK INTERVENTIONS

## 2023-01-24 NOTE — PROGRESS NOTE ADULT - SUBJECTIVE AND OBJECTIVE BOX
Patient is a 82y old  Female who presents with a chief complaint of Pneumonia, hx MRSA (2023 09:08)        Over Night Events:    On oxygen via nasal cannula   No fevers noted  No complaints of shortness of breath   CT noted          ROS:  See HPI    PHYSICAL EXAM    ICU Vital Signs Last 24 Hrs  T(C): 36.2 (2023 05:27), Max: 36.2 (2023 22:14)  T(F): 97.2 (2023 05:27), Max: 97.2 (2023 22:14)  HR: 59 (2023 08:37) (59 - 78)  BP: 127/62 (2023 08:37) (114/89 - 154/99)  BP(mean): --  ABP: --  ABP(mean): --  RR: 20 (2023 08:37) (18 - 20)  SpO2: 100% (2023 08:37) (97% - 100%)    O2 Parameters below as of 2023 08:37  Patient On (Oxygen Delivery Method): nasal cannula            General: NAD  HEENT: CAL             Lymphatic system: No cervical LN   Lungs: Bilateral BS, crackles at the bases   Cardiovascular: Regular   Gastrointestinal: Soft, Positive BS  Extremities: No clubbing.  Moves extremities.  Full Range of motion   Skin: Warm, intact  Neurological: No motor or sensory deficit         LABS:                            9.1    11.80 )-----------( 581      ( 2023 06:00 )             32.1                                               01-24    140  |  108  |  19  ----------------------------<  123<H>  4.4   |  23  |  0.9    Ca    9.0      2023 06:20  Phos  3.4     -24  Mg     2.2     -24    TPro  5.6<L>  /  Alb  3.6  /  TBili  <0.2  /  DBili  x   /  AST  15  /  ALT  12  /  AlkPhos  63  -24                                             Urinalysis Basic - ( 2023 10:15 )    Color: Light Yellow / Appearance: Clear / S.013 / pH: x  Gluc: x / Ketone: Trace  / Bili: Negative / Urobili: <2 mg/dL   Blood: x / Protein: Trace / Nitrite: Negative   Leuk Esterase: Small / RBC: 2 /HPF / WBC 3 /HPF   Sq Epi: x / Non Sq Epi: 7 /HPF / Bacteria: Negative        CARDIAC MARKERS ( 2023 06:31 )  x     / <0.01 ng/mL / x     / x     / x      CARDIAC MARKERS ( 2023 21:36 )  x     / <0.01 ng/mL / x     / x     / x                                                LIVER FUNCTIONS - ( 2023 06:20 )  Alb: 3.6 g/dL / Pro: 5.6 g/dL / ALK PHOS: 63 U/L / ALT: 12 U/L / AST: 15 U/L / GGT: x                                                                                                                                       MEDICATIONS  (STANDING):  albuterol/ipratropium for Nebulization 3 milliLiter(s) Nebulizer every 6 hours  ALPRAZolam 1 milliGRAM(s) Oral two times a day  aspirin enteric coated 81 milliGRAM(s) Oral daily  atorvastatin 40 milliGRAM(s) Oral at bedtime  budesonide 160 MICROgram(s)/formoterol 4.5 MICROgram(s) Inhaler 2 Puff(s) Inhalation two times a day  carvedilol 12.5 milliGRAM(s) Oral every 12 hours  cefepime   IVPB 2000 milliGRAM(s) IV Intermittent every 8 hours  clopidogrel Tablet 75 milliGRAM(s) Oral daily  dextrose 5%. 1000 milliLiter(s) (50 mL/Hr) IV Continuous <Continuous>  dextrose 50% Injectable 25 Gram(s) IV Push once  enoxaparin Injectable 40 milliGRAM(s) SubCutaneous every 24 hours  gabapentin 600 milliGRAM(s) Oral two times a day  glucagon  Injectable 1 milliGRAM(s) IntraMuscular once  guaiFENesin ER 1200 milliGRAM(s) Oral every 12 hours  insulin glargine Injectable (LANTUS) 19 Unit(s) SubCutaneous at bedtime  insulin lispro (ADMELOG) corrective regimen sliding scale   SubCutaneous three times a day before meals  losartan 25 milliGRAM(s) Oral daily  oxybutynin 5 milliGRAM(s) Oral two times a day  pantoprazole    Tablet 40 milliGRAM(s) Oral before breakfast  predniSONE   Tablet 40 milliGRAM(s) Oral daily  risperiDONE   Tablet 1 milliGRAM(s) Oral at bedtime  sertraline 100 milliGRAM(s) Oral daily  tiotropium 2.5 MICROgram(s) Inhaler 2 Puff(s) Inhalation daily  vancomycin  IVPB 1000 milliGRAM(s) IV Intermittent every 12 hours    MEDICATIONS  (PRN):  acetaminophen     Tablet .. 650 milliGRAM(s) Oral every 6 hours PRN Temp greater or equal to 38C (100.4F), Mild Pain (1 - 3)  aluminum hydroxide/magnesium hydroxide/simethicone Suspension 30 milliLiter(s) Oral every 4 hours PRN Dyspepsia  dextrose Oral Gel 15 Gram(s) Oral once PRN Blood Glucose LESS THAN 70 milliGRAM(s)/deciliter  dicyclomine 20 milliGRAM(s) Oral three times a day before meals PRN abdominal pain  melatonin 3 milliGRAM(s) Oral at bedtime PRN Insomnia  ondansetron Injectable 4 milliGRAM(s) IV Push every 8 hours PRN Nausea and/or Vomiting      Xrays:                                                                                     ECHO

## 2023-01-24 NOTE — PROGRESS NOTE ADULT - ASSESSMENT
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   Check sputum cultures, urine legionella and strep. Procalcitonin negative.   Check nasal MRSA, if negative DC Vancomycin.   If all cultures negative woule de-escalate abx to Rocephin and Azithromycin. ID on board.   She is a former heavy smoker; Likely has COPD as well.   Continue Symbicort and Spiriva; albuterol PRN.   Continue with prednisone 40mg and taper over 10 days.   Continue Lovenox Q24h daily; check duplex of the lower extremities.    Outpatient PFTs, repeat CT in 4-6 weeks.   Check baseline CTD workup; this can be done outpatient.   HOB at 45, aspiration precautions. Diet per speech and swallow.   Ambulate as tolerated.   Will follow.  IMPRESSION:    Acute hypoxemic resp failure  PNA ( bl airspace disease)  ILD - UIP ?   Anemia  COPD exacerbaiton  HO COPD  HO CAD s/p PCI    RECOMMENDATIONS:    Chest CT noted; Likely superimposed pneumonia on top of ILD/UIP.   Check sputum cultures, urine legionella and strep. Procalcitonin negative.   Check nasal MRSA, if negative DC Vancomycin.   If all cultures negative woule de-escalate abx to Rocephin and Azithromycin. ID on board.   She is a former heavy smoker; Likely has COPD as well.   Continue Symbicort and Spiriva; albuterol PRN.   Continue with prednisone 40mg and taper over 10 days.   Continue Lovenox Q24h daily; check duplex of the lower extremities.    Outpatient PFTs, repeat CT in 4-6 weeks.   Check baseline CTD workup; this can be done outpatient.   HOB at 45, aspiration precautions. Diet per speech and swallow.   Ambulate as tolerated.   Will follow.

## 2023-01-25 LAB
ALBUMIN SERPL ELPH-MCNC: 3.5 G/DL — SIGNIFICANT CHANGE UP (ref 3.5–5.2)
ALP SERPL-CCNC: 62 U/L — SIGNIFICANT CHANGE UP (ref 30–115)
ALT FLD-CCNC: 15 U/L — SIGNIFICANT CHANGE UP (ref 0–41)
ANION GAP SERPL CALC-SCNC: 11 MMOL/L — SIGNIFICANT CHANGE UP (ref 7–14)
AST SERPL-CCNC: 15 U/L — SIGNIFICANT CHANGE UP (ref 0–41)
BASOPHILS # BLD AUTO: 0.02 K/UL — SIGNIFICANT CHANGE UP (ref 0–0.2)
BASOPHILS NFR BLD AUTO: 0.2 % — SIGNIFICANT CHANGE UP (ref 0–1)
BILIRUB SERPL-MCNC: <0.2 MG/DL — SIGNIFICANT CHANGE UP (ref 0.2–1.2)
BUN SERPL-MCNC: 23 MG/DL — HIGH (ref 10–20)
CALCIUM SERPL-MCNC: 9.3 MG/DL — SIGNIFICANT CHANGE UP (ref 8.4–10.5)
CHLORIDE SERPL-SCNC: 108 MMOL/L — SIGNIFICANT CHANGE UP (ref 98–110)
CO2 SERPL-SCNC: 22 MMOL/L — SIGNIFICANT CHANGE UP (ref 17–32)
CREAT SERPL-MCNC: 1 MG/DL — SIGNIFICANT CHANGE UP (ref 0.7–1.5)
EGFR: 57 ML/MIN/1.73M2 — LOW
EOSINOPHIL # BLD AUTO: 0.19 K/UL — SIGNIFICANT CHANGE UP (ref 0–0.7)
EOSINOPHIL NFR BLD AUTO: 2 % — SIGNIFICANT CHANGE UP (ref 0–8)
GLUCOSE BLDC GLUCOMTR-MCNC: 131 MG/DL — HIGH (ref 70–99)
GLUCOSE BLDC GLUCOMTR-MCNC: 144 MG/DL — HIGH (ref 70–99)
GLUCOSE BLDC GLUCOMTR-MCNC: 94 MG/DL — SIGNIFICANT CHANGE UP (ref 70–99)
GLUCOSE BLDC GLUCOMTR-MCNC: 96 MG/DL — SIGNIFICANT CHANGE UP (ref 70–99)
GLUCOSE SERPL-MCNC: 86 MG/DL — SIGNIFICANT CHANGE UP (ref 70–99)
HCT VFR BLD CALC: 31.8 % — LOW (ref 37–47)
HGB BLD-MCNC: 8.8 G/DL — LOW (ref 12–16)
IMM GRANULOCYTES NFR BLD AUTO: 0.5 % — HIGH (ref 0.1–0.3)
LYMPHOCYTES # BLD AUTO: 2.44 K/UL — SIGNIFICANT CHANGE UP (ref 1.2–3.4)
LYMPHOCYTES # BLD AUTO: 25.3 % — SIGNIFICANT CHANGE UP (ref 20.5–51.1)
MAGNESIUM SERPL-MCNC: 2.3 MG/DL — SIGNIFICANT CHANGE UP (ref 1.8–2.4)
MCHC RBC-ENTMCNC: 23.4 PG — LOW (ref 27–31)
MCHC RBC-ENTMCNC: 27.7 G/DL — LOW (ref 32–37)
MCV RBC AUTO: 84.6 FL — SIGNIFICANT CHANGE UP (ref 81–99)
MONOCYTES # BLD AUTO: 0.83 K/UL — HIGH (ref 0.1–0.6)
MONOCYTES NFR BLD AUTO: 8.6 % — SIGNIFICANT CHANGE UP (ref 1.7–9.3)
MRSA PCR RESULT.: NEGATIVE — SIGNIFICANT CHANGE UP
NEUTROPHILS # BLD AUTO: 6.11 K/UL — SIGNIFICANT CHANGE UP (ref 1.4–6.5)
NEUTROPHILS NFR BLD AUTO: 63.4 % — SIGNIFICANT CHANGE UP (ref 42.2–75.2)
NRBC # BLD: 0 /100 WBCS — SIGNIFICANT CHANGE UP (ref 0–0)
PHOSPHATE SERPL-MCNC: 3.4 MG/DL — SIGNIFICANT CHANGE UP (ref 2.1–4.9)
PLATELET # BLD AUTO: 594 K/UL — HIGH (ref 130–400)
POTASSIUM SERPL-MCNC: 4.5 MMOL/L — SIGNIFICANT CHANGE UP (ref 3.5–5)
POTASSIUM SERPL-SCNC: 4.5 MMOL/L — SIGNIFICANT CHANGE UP (ref 3.5–5)
PROT SERPL-MCNC: 5.6 G/DL — LOW (ref 6–8)
RAPID RVP RESULT: SIGNIFICANT CHANGE UP
RBC # BLD: 3.76 M/UL — LOW (ref 4.2–5.4)
RBC # FLD: 19.3 % — HIGH (ref 11.5–14.5)
S PNEUM AG UR QL: NEGATIVE — SIGNIFICANT CHANGE UP
SARS-COV-2 RNA SPEC QL NAA+PROBE: SIGNIFICANT CHANGE UP
SODIUM SERPL-SCNC: 141 MMOL/L — SIGNIFICANT CHANGE UP (ref 135–146)
WBC # BLD: 9.64 K/UL — SIGNIFICANT CHANGE UP (ref 4.8–10.8)
WBC # FLD AUTO: 9.64 K/UL — SIGNIFICANT CHANGE UP (ref 4.8–10.8)

## 2023-01-25 PROCEDURE — 93306 TTE W/DOPPLER COMPLETE: CPT | Mod: 26

## 2023-01-25 PROCEDURE — 93970 EXTREMITY STUDY: CPT | Mod: 26

## 2023-01-25 PROCEDURE — 99233 SBSQ HOSP IP/OBS HIGH 50: CPT

## 2023-01-25 RX ORDER — AZITHROMYCIN 500 MG/1
500 TABLET, FILM COATED ORAL ONCE
Refills: 0 | Status: COMPLETED | OUTPATIENT
Start: 2023-01-25 | End: 2023-01-25

## 2023-01-25 RX ORDER — CEFTRIAXONE 500 MG/1
1000 INJECTION, POWDER, FOR SOLUTION INTRAMUSCULAR; INTRAVENOUS EVERY 24 HOURS
Refills: 0 | Status: DISCONTINUED | OUTPATIENT
Start: 2023-01-25 | End: 2023-01-27

## 2023-01-25 RX ORDER — INSULIN GLARGINE 100 [IU]/ML
15 INJECTION, SOLUTION SUBCUTANEOUS AT BEDTIME
Refills: 0 | Status: DISCONTINUED | OUTPATIENT
Start: 2023-01-25 | End: 2023-02-01

## 2023-01-25 RX ORDER — AZITHROMYCIN 500 MG/1
500 TABLET, FILM COATED ORAL EVERY 24 HOURS
Refills: 0 | Status: DISCONTINUED | OUTPATIENT
Start: 2023-01-26 | End: 2023-01-27

## 2023-01-25 RX ORDER — AZITHROMYCIN 500 MG/1
TABLET, FILM COATED ORAL
Refills: 0 | Status: DISCONTINUED | OUTPATIENT
Start: 2023-01-25 | End: 2023-01-27

## 2023-01-25 RX ADMIN — GABAPENTIN 600 MILLIGRAM(S): 400 CAPSULE ORAL at 05:46

## 2023-01-25 RX ADMIN — CARVEDILOL PHOSPHATE 12.5 MILLIGRAM(S): 80 CAPSULE, EXTENDED RELEASE ORAL at 17:30

## 2023-01-25 RX ADMIN — PANTOPRAZOLE SODIUM 40 MILLIGRAM(S): 20 TABLET, DELAYED RELEASE ORAL at 05:46

## 2023-01-25 RX ADMIN — INSULIN GLARGINE 15 UNIT(S): 100 INJECTION, SOLUTION SUBCUTANEOUS at 22:19

## 2023-01-25 RX ADMIN — AZITHROMYCIN 500 MILLIGRAM(S): 500 TABLET, FILM COATED ORAL at 12:32

## 2023-01-25 RX ADMIN — Medication 40 MILLIGRAM(S): at 05:46

## 2023-01-25 RX ADMIN — CEFEPIME 100 MILLIGRAM(S): 1 INJECTION, POWDER, FOR SOLUTION INTRAMUSCULAR; INTRAVENOUS at 02:22

## 2023-01-25 RX ADMIN — LOSARTAN POTASSIUM 25 MILLIGRAM(S): 100 TABLET, FILM COATED ORAL at 05:46

## 2023-01-25 RX ADMIN — Medication 5 MILLIGRAM(S): at 17:30

## 2023-01-25 RX ADMIN — CLOPIDOGREL BISULFATE 75 MILLIGRAM(S): 75 TABLET, FILM COATED ORAL at 12:35

## 2023-01-25 RX ADMIN — ENOXAPARIN SODIUM 40 MILLIGRAM(S): 100 INJECTION SUBCUTANEOUS at 12:36

## 2023-01-25 RX ADMIN — Medication 5 MILLIGRAM(S): at 05:46

## 2023-01-25 RX ADMIN — Medication 1 MILLIGRAM(S): at 17:37

## 2023-01-25 RX ADMIN — Medication 1200 MILLIGRAM(S): at 05:45

## 2023-01-25 RX ADMIN — CEFTRIAXONE 100 MILLIGRAM(S): 500 INJECTION, POWDER, FOR SOLUTION INTRAMUSCULAR; INTRAVENOUS at 13:59

## 2023-01-25 RX ADMIN — Medication 81 MILLIGRAM(S): at 12:35

## 2023-01-25 RX ADMIN — Medication 1200 MILLIGRAM(S): at 17:30

## 2023-01-25 RX ADMIN — ATORVASTATIN CALCIUM 40 MILLIGRAM(S): 80 TABLET, FILM COATED ORAL at 22:21

## 2023-01-25 RX ADMIN — Medication 3 MILLILITER(S): at 20:22

## 2023-01-25 RX ADMIN — Medication 1 MILLIGRAM(S): at 05:45

## 2023-01-25 RX ADMIN — CARVEDILOL PHOSPHATE 12.5 MILLIGRAM(S): 80 CAPSULE, EXTENDED RELEASE ORAL at 05:46

## 2023-01-25 RX ADMIN — GABAPENTIN 600 MILLIGRAM(S): 400 CAPSULE ORAL at 17:30

## 2023-01-25 RX ADMIN — SERTRALINE 100 MILLIGRAM(S): 25 TABLET, FILM COATED ORAL at 12:35

## 2023-01-25 RX ADMIN — RISPERIDONE 1 MILLIGRAM(S): 4 TABLET ORAL at 22:20

## 2023-01-25 NOTE — PHARMACOTHERAPY INTERVENTION NOTE - NSPHARMCOMMASP
ASP - Lab/ test recommended
ASP - Dose optimization/Non-Renal dose adjustment
ASP - Lab/ test recommended
ASP - Lab/ test recommended
ASP - Renal dose adjustment

## 2023-01-25 NOTE — PROGRESS NOTE ADULT - SUBJECTIVE AND OBJECTIVE BOX
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 Presented  for SOB x1wkm- Initially sudden onset mostly on exertion associated with productive cough of whitish sputum.  pt reports 1x episode of midsternal burning chest pain, non radiating, ~5 mins, not related to exertions, worsened with chest palpation and resolved by itself. SOB progressed so she decided to present to the ED. ROS (+) for  chills (-) for fever, sore throat, rhinorrhea, congestion, headache, myalgia, sick contacts, chest pain, abdominal pain, N/V, diarrhea or dysuria.    VS: Requiring 2L NC, otherwise afebrile, normotensive, nontachycardic  Labs: (-) Leukocytosis, Procal WNL, Trop (-), , VBG pH/pCO2/pO2/HCO3 (WNL), UA (-), COVID/FLU/RSV (-), Legionella (-), BCx NGTD  CXR: Extensive bilateral opacities, EKG: NSR w/ T-wave abnormality (present on previous)  CT Chest : Bilateral peripheral and basilar distributed honeycombing with bronchiolectasis, compatible with UIP; Superimposed areas of consolidation compatible with PNA; Extensive coronary artery calcifications; Left ventricular apex aneurysm. Diminished attenuation with  Patient received solumedrol 125 mg IV, ceftriaxone/azithromycin and admitted to [facility] for further management    SUBJECTIVE     Overnight events  None    Subjective complaints                                               ----------------------------------------------------------  OBJECTIVE  PAST MEDICAL & SURGICAL HISTORY  DM (diabetes mellitus)    CAD (coronary artery disease)    HLD (hyperlipidemia)    HTN (hypertension)    Neuropathy    COPD (chronic obstructive pulmonary disease)                                              -----------------------------------------------------------  ALLERGIES:  No Known Allergies                                            ------------------------------------------------------------    HOME MEDICATIONS  Home Medications:  albuterol 90 mcg/inh inhalation aerosol: 2 puff(s) inhaled every 6 hours, As Needed (2023 02:10)  aspirin 81 mg oral delayed release tablet: 1 tab(s) orally once a day (2023 02:10)  carvedilol 25 mg oral tablet: 0.5 tab(s) orally 2 times a day (2023 02:10)  dicyclomine 20 mg oral tablet: 1 tab(s) orally 3 times a day, As Needed (2023 02:10)  gabapentin 600 mg oral tablet: 1 tab(s) orally 2 times a day (2023 02:10)  losartan 25 mg oral tablet: 1 tab(s) orally once a day (2023 02:10)  pantoprazole 40 mg oral delayed release tablet: 1 tab(s) orally once a day (2023 02:10)  risperiDONE 1 mg oral tablet: 1 tab(s) orally once a day (at bedtime) (2023 02:10)  solifenacin 5 mg oral tablet: 1 tab(s) orally once a day (2023 02:10)  Tradjenta 5 mg oral tablet: 1 tab(s) orally once a day (2023 02:10)  Trelegy Ellipta 200 mcg-62.5 mcg-25 mcg/inh inhalation powder: 1 puff(s) inhaled once a day (2023 02:10)  Xanax 1 mg oral tablet: 1 tab(s) orally 2 times a day (2023 02:10)  Zoloft 100 mg oral tablet: 1 tab(s) orally once a day (2023 02:10)                           MEDICATIONS:  STANDING MEDICATIONS  albuterol/ipratropium for Nebulization 3 milliLiter(s) Nebulizer every 6 hours  ALPRAZolam 1 milliGRAM(s) Oral two times a day  aspirin enteric coated 81 milliGRAM(s) Oral daily  atorvastatin 40 milliGRAM(s) Oral at bedtime  budesonide 160 MICROgram(s)/formoterol 4.5 MICROgram(s) Inhaler 2 Puff(s) Inhalation two times a day  carvedilol 12.5 milliGRAM(s) Oral every 12 hours  cefepime   IVPB 2000 milliGRAM(s) IV Intermittent every 8 hours  clopidogrel Tablet 75 milliGRAM(s) Oral daily  dextrose 5%. 1000 milliLiter(s) IV Continuous <Continuous>  dextrose 50% Injectable 25 Gram(s) IV Push once  enoxaparin Injectable 40 milliGRAM(s) SubCutaneous every 24 hours  gabapentin 600 milliGRAM(s) Oral two times a day  glucagon  Injectable 1 milliGRAM(s) IntraMuscular once  guaiFENesin ER 1200 milliGRAM(s) Oral every 12 hours  insulin glargine Injectable (LANTUS) 19 Unit(s) SubCutaneous at bedtime  insulin lispro (ADMELOG) corrective regimen sliding scale   SubCutaneous three times a day before meals  losartan 25 milliGRAM(s) Oral daily  oxybutynin 5 milliGRAM(s) Oral two times a day  pantoprazole    Tablet 40 milliGRAM(s) Oral before breakfast  predniSONE   Tablet 40 milliGRAM(s) Oral daily  risperiDONE   Tablet 1 milliGRAM(s) Oral at bedtime  sertraline 100 milliGRAM(s) Oral daily  tiotropium 2.5 MICROgram(s) Inhaler 2 Puff(s) Inhalation daily  vancomycin  IVPB 1000 milliGRAM(s) IV Intermittent every 24 hours    PRN MEDICATIONS  acetaminophen     Tablet .. 650 milliGRAM(s) Oral every 6 hours PRN  aluminum hydroxide/magnesium hydroxide/simethicone Suspension 30 milliLiter(s) Oral every 4 hours PRN  dextrose Oral Gel 15 Gram(s) Oral once PRN  dicyclomine 20 milliGRAM(s) Oral three times a day before meals PRN  melatonin 3 milliGRAM(s) Oral at bedtime PRN  ondansetron Injectable 4 milliGRAM(s) IV Push every 8 hours PRN                                            ------------------------------------------------------------  VITAL SIGNS: Last 24 Hours  T(C): 36.7 (2023 05:00), Max: 36.7 (2023 05:00)  T(F): 98.1 (2023 05:00), Max: 98.1 (2023 05:00)  HR: 62 (2023 05:00) (59 - 73)  BP: 123/58 (2023 05:00) (123/58 - 148/73)  BP(mean): --  RR: 19 (2023 05:00) (18 - 20)  SpO2: 98% (2023 18:05) (98% - 100%)                                             --------------------------------------------------------------  LABS:                        9.1    11.80 )-----------( 581      ( 2023 06:00 )             32.1     01-24    140  |  108  |  19  ----------------------------<  123<H>  4.4   |  23  |  0.9    Ca    9.0      2023 06:20  Phos  3.4     -  Mg     2.2         TPro  5.6<L>  /  Alb  3.6  /  TBili  <0.2  /  DBili  x   /  AST  15  /  ALT  12  /  AlkPhos  63  -24      Urinalysis Basic - ( 2023 10:15 )    Color: Light Yellow / Appearance: Clear / S.013 / pH: x  Gluc: x / Ketone: Trace  / Bili: Negative / Urobili: <2 mg/dL   Blood: x / Protein: Trace / Nitrite: Negative   Leuk Esterase: Small / RBC: 2 /HPF / WBC 3 /HPF   Sq Epi: x / Non Sq Epi: 7 /HPF / Bacteria: Negative              Culture - Blood (collected 2023 06:31)  Source: .Blood None  Preliminary Report (2023 18:02):    No growth to date.        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 Presented  for SOB x1wkm- Initially sudden onset mostly on exertion associated with productive cough of whitish sputum.  pt reports 1x episode of midsternal burning chest pain, non radiating, ~5 mins, not related to exertions, worsened with chest palpation and resolved by itself. SOB progressed so she decided to present to the ED. ROS (+) for  chills (-) for fever, sore throat, rhinorrhea, congestion, headache, myalgia, sick contacts, chest pain, abdominal pain, N/V, diarrhea or dysuria.    VS: Requiring 2L NC, otherwise afebrile, normotensive, nontachycardic  Labs: (-) Leukocytosis, Procal WNL, Trop (-), , VBG pH/pCO2/pO2/HCO3 (WNL), UA (-), COVID/FLU/RSV (-), Legionella (-), BCx NGTD  CXR: Extensive bilateral opacities, EKG: NSR w/ T-wave abnormality (present on previous)  CT Chest : Bilateral peripheral and basilar distributed honeycombing with bronchiolectasis, compatible with UIP; Superimposed areas of consolidation compatible with PNA; Extensive coronary artery calcifications; Left ventricular apex aneurysm. Diminished attenuation with  Patient received solumedrol 125 mg IV, ceftriaxone/azithromycin and admitted to [facility] for further management    SUBJECTIVE     Overnight events  None  pt remains on 2-3 LNC  denies fever, chills, syncope, seizure, headache,, abd pain, N/V/D, urinary symptoms, legs swelling, Subjective complaints                                               ----------------------------------------------------------  OBJECTIVE  PAST MEDICAL & SURGICAL HISTORY  DM (diabetes mellitus)    CAD (coronary artery disease)    HLD (hyperlipidemia)    HTN (hypertension)    Neuropathy    COPD (chronic obstructive pulmonary disease)                                              -----------------------------------------------------------  ALLERGIES:  No Known Allergies                                            ------------------------------------------------------------    HOME MEDICATIONS  Home Medications:  albuterol 90 mcg/inh inhalation aerosol: 2 puff(s) inhaled every 6 hours, As Needed (2023 02:10)  aspirin 81 mg oral delayed release tablet: 1 tab(s) orally once a day (:10)  carvedilol 25 mg oral tablet: 0.5 tab(s) orally 2 times a day (2023 02:10)  dicyclomine 20 mg oral tablet: 1 tab(s) orally 3 times a day, As Needed (2023 02:10)  gabapentin 600 mg oral tablet: 1 tab(s) orally 2 times a day (2023 02:10)  losartan 25 mg oral tablet: 1 tab(s) orally once a day (2023 02:10)  pantoprazole 40 mg oral delayed release tablet: 1 tab(s) orally once a day (2023 02:10)  risperiDONE 1 mg oral tablet: 1 tab(s) orally once a day (at bedtime) (2023 02:10)  solifenacin 5 mg oral tablet: 1 tab(s) orally once a day (2023 02:10)  Tradjenta 5 mg oral tablet: 1 tab(s) orally once a day (2023 02:10)  Trelegy Ellipta 200 mcg-62.5 mcg-25 mcg/inh inhalation powder: 1 puff(s) inhaled once a day (2023 02:10)  Xanax 1 mg oral tablet: 1 tab(s) orally 2 times a day (2023 02:10)  Zoloft 100 mg oral tablet: 1 tab(s) orally once a day (2023 02:10)                           MEDICATIONS:  STANDING MEDICATIONS  albuterol/ipratropium for Nebulization 3 milliLiter(s) Nebulizer every 6 hours  ALPRAZolam 1 milliGRAM(s) Oral two times a day  aspirin enteric coated 81 milliGRAM(s) Oral daily  atorvastatin 40 milliGRAM(s) Oral at bedtime  budesonide 160 MICROgram(s)/formoterol 4.5 MICROgram(s) Inhaler 2 Puff(s) Inhalation two times a day  carvedilol 12.5 milliGRAM(s) Oral every 12 hours  cefepime   IVPB 2000 milliGRAM(s) IV Intermittent every 8 hours  clopidogrel Tablet 75 milliGRAM(s) Oral daily  dextrose 5%. 1000 milliLiter(s) IV Continuous <Continuous>  dextrose 50% Injectable 25 Gram(s) IV Push once  enoxaparin Injectable 40 milliGRAM(s) SubCutaneous every 24 hours  gabapentin 600 milliGRAM(s) Oral two times a day  glucagon  Injectable 1 milliGRAM(s) IntraMuscular once  guaiFENesin ER 1200 milliGRAM(s) Oral every 12 hours  insulin glargine Injectable (LANTUS) 19 Unit(s) SubCutaneous at bedtime  insulin lispro (ADMELOG) corrective regimen sliding scale   SubCutaneous three times a day before meals  losartan 25 milliGRAM(s) Oral daily  oxybutynin 5 milliGRAM(s) Oral two times a day  pantoprazole    Tablet 40 milliGRAM(s) Oral before breakfast  predniSONE   Tablet 40 milliGRAM(s) Oral daily  risperiDONE   Tablet 1 milliGRAM(s) Oral at bedtime  sertraline 100 milliGRAM(s) Oral daily  tiotropium 2.5 MICROgram(s) Inhaler 2 Puff(s) Inhalation daily  vancomycin  IVPB 1000 milliGRAM(s) IV Intermittent every 24 hours    PRN MEDICATIONS  acetaminophen     Tablet .. 650 milliGRAM(s) Oral every 6 hours PRN  aluminum hydroxide/magnesium hydroxide/simethicone Suspension 30 milliLiter(s) Oral every 4 hours PRN  dextrose Oral Gel 15 Gram(s) Oral once PRN  dicyclomine 20 milliGRAM(s) Oral three times a day before meals PRN  melatonin 3 milliGRAM(s) Oral at bedtime PRN  ondansetron Injectable 4 milliGRAM(s) IV Push every 8 hours PRN                                            ------------------------------------------------------------  VITAL SIGNS: Last 24 Hours  T(C): 36.7 (2023 05:00), Max: 36.7 (2023 05:00)  T(F): 98.1 (2023 05:00), Max: 98.1 (2023 05:00)  HR: 62 (2023 05:00) (59 - 73)  BP: 123/58 (2023 05:00) (123/58 - 148/73)  BP(mean): --  RR: 19 (2023 05:00) (18 - 20)  SpO2: 98% (2023 18:05) (98% - 100%)                                             --------------------------------------------------------------  LABS:                        9.1    11.80 )-----------( 581      ( 2023 06:00 )             32.1     01-24    140  |  108  |  19  ----------------------------<  123<H>  4.4   |  23  |  0.9    Ca    9.0      2023 06:20  Phos  3.4     -  Mg     2.2     -    TPro  5.6<L>  /  Alb  3.6  /  TBili  <0.2  /  DBili  x   /  AST  15  /  ALT  12  /  AlkPhos  63  -24      Urinalysis Basic - ( 2023 10:15 )    Color: Light Yellow / Appearance: Clear / S.013 / pH: x  Gluc: x / Ketone: Trace  / Bili: Negative / Urobili: <2 mg/dL   Blood: x / Protein: Trace / Nitrite: Negative   Leuk Esterase: Small / RBC: 2 /HPF / WBC 3 /HPF   Sq Epi: x / Non Sq Epi: 7 /HPF / Bacteria: Negative              Culture - Blood (collected 2023 06:31)  Source: .Blood None  Preliminary Report (2023 18:02):    No growth to date.        CARDIAC MARKERS ( 2023 06:31 )  x     / <0.01 ng/mL / x     / x     / x                                                  -------------------------------------------------------------  RADIOLOGY:                   < from: CT Chest No Cont (23 @ 17:43) >  IMPRESSION:      Bilateral peripheral and basilar distributed honeycombing with   bronchiolectasis,, compatible with UIP.    Superimposed areas of consolidation, right lower lobe and right middle   lobes, compatible with pneumonia.      Extensive coronary artery calcifications.    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 Diminished attenuation within   cardiac chambers, suggesting underlying anemia.    < end of copied text >                           --------------------------------------------------------------    PHYSICAL EXAM:                                             --------------------------------------------------------------    CONSTITUTIONAL: No acute distress, well-developed, well-groomed, AAOx3  HEAD: Atraumatic, normocephalic  EYES: EOM intact, PERRLA, conjunctiva and sclera clear  ENT: Supple, no masses, no thyromegaly, no bruits, no JVD; moist mucous membranes  PULMONARY: Clear to auscultation bilaterally; no wheezes, rales, or rhonchi  CARDIOVASCULAR: Regular rate and rhythm; no murmurs, rubs, or gallops  GASTROINTESTINAL: Soft, non-tender, non-distended; bowel sounds present  MUSCULOSKELETAL: 2+ peripheral pulses; no clubbing, no cyanosis, no edema  NEUROLOGY: non-focal  SKIN: No rashes or lesions; warm and dry          MRSA PCR Result.: Negative: By: Real-Time PCR (Polymerase Reaction Method) (23 @ 08:00)

## 2023-01-25 NOTE — PROGRESS NOTE ADULT - SUBJECTIVE AND OBJECTIVE BOX
Patient is a 82y old  Female who presents with a chief complaint of Pneumonia, hx MRSA (2023 09:08)        Over Night Events:    Feels better   Remains on O2 via nasal cannula   Minimal Fio2         ROS:  See HPI    PHYSICAL EXAM    ICU Vital Signs Last 24 Hrs  T(C): 36.7 (2023 05:00), Max: 36.7 (2023 05:00)  T(F): 98.1 (2023 05:00), Max: 98.1 (2023 05:00)  HR: 62 (2023 05:00) (59 - 73)  BP: 123/58 (2023 05:00) (123/58 - 148/73)  BP(mean): --  ABP: --  ABP(mean): --  RR: 19 (2023 05:00) (18 - 20)  SpO2: 98% (2023 18:05) (98% - 100%)    O2 Parameters below as of 2023 18:05  Patient On (Oxygen Delivery Method): nasal cannula  O2 Flow (L/min): 2          General: NAD  HEENT: CAL             Lymphatic system: No cervical LN   Lungs: Bilateral BS, rhonchi  Cardiovascular: Regular   Gastrointestinal: Soft, Positive BS  Extremities: No clubbing.  Moves extremities.  Full Range of motion   Skin: Warm, intact  Neurological: No motor or sensory deficit         LABS:                            9.1    11.80 )-----------( 581      ( 2023 06:00 )             32.1                                               01-24    140  |  108  |  19  ----------------------------<  123<H>  4.4   |  23  |  0.9    Ca    9.0      2023 06:20  Phos  3.4     -24  Mg     2.2     -24    TPro  5.6<L>  /  Alb  3.6  /  TBili  <0.2  /  DBili  x   /  AST  15  /  ALT  12  /  AlkPhos  63  -24                                             Urinalysis Basic - ( 2023 10:15 )    Color: Light Yellow / Appearance: Clear / S.013 / pH: x  Gluc: x / Ketone: Trace  / Bili: Negative / Urobili: <2 mg/dL   Blood: x / Protein: Trace / Nitrite: Negative   Leuk Esterase: Small / RBC: 2 /HPF / WBC 3 /HPF   Sq Epi: x / Non Sq Epi: 7 /HPF / Bacteria: Negative                                                  LIVER FUNCTIONS - ( 2023 06:20 )  Alb: 3.6 g/dL / Pro: 5.6 g/dL / ALK PHOS: 63 U/L / ALT: 12 U/L / AST: 15 U/L / GGT: x                                                  Culture - Blood (collected 2023 06:31)  Source: .Blood None  Preliminary Report (2023 18:02):    No growth to date.                                                                                           MEDICATIONS  (STANDING):  albuterol/ipratropium for Nebulization 3 milliLiter(s) Nebulizer every 6 hours  ALPRAZolam 1 milliGRAM(s) Oral two times a day  aspirin enteric coated 81 milliGRAM(s) Oral daily  atorvastatin 40 milliGRAM(s) Oral at bedtime  budesonide 160 MICROgram(s)/formoterol 4.5 MICROgram(s) Inhaler 2 Puff(s) Inhalation two times a day  carvedilol 12.5 milliGRAM(s) Oral every 12 hours  cefepime   IVPB 2000 milliGRAM(s) IV Intermittent every 8 hours  clopidogrel Tablet 75 milliGRAM(s) Oral daily  dextrose 5%. 1000 milliLiter(s) (50 mL/Hr) IV Continuous <Continuous>  dextrose 50% Injectable 25 Gram(s) IV Push once  enoxaparin Injectable 40 milliGRAM(s) SubCutaneous every 24 hours  gabapentin 600 milliGRAM(s) Oral two times a day  glucagon  Injectable 1 milliGRAM(s) IntraMuscular once  guaiFENesin ER 1200 milliGRAM(s) Oral every 12 hours  insulin glargine Injectable (LANTUS) 19 Unit(s) SubCutaneous at bedtime  insulin lispro (ADMELOG) corrective regimen sliding scale   SubCutaneous three times a day before meals  losartan 25 milliGRAM(s) Oral daily  oxybutynin 5 milliGRAM(s) Oral two times a day  pantoprazole    Tablet 40 milliGRAM(s) Oral before breakfast  predniSONE   Tablet 40 milliGRAM(s) Oral daily  risperiDONE   Tablet 1 milliGRAM(s) Oral at bedtime  sertraline 100 milliGRAM(s) Oral daily  tiotropium 2.5 MICROgram(s) Inhaler 2 Puff(s) Inhalation daily  vancomycin  IVPB 1000 milliGRAM(s) IV Intermittent every 24 hours    MEDICATIONS  (PRN):  acetaminophen     Tablet .. 650 milliGRAM(s) Oral every 6 hours PRN Temp greater or equal to 38C (100.4F), Mild Pain (1 - 3)  aluminum hydroxide/magnesium hydroxide/simethicone Suspension 30 milliLiter(s) Oral every 4 hours PRN Dyspepsia  dextrose Oral Gel 15 Gram(s) Oral once PRN Blood Glucose LESS THAN 70 milliGRAM(s)/deciliter  dicyclomine 20 milliGRAM(s) Oral three times a day before meals PRN abdominal pain  melatonin 3 milliGRAM(s) Oral at bedtime PRN Insomnia  ondansetron Injectable 4 milliGRAM(s) IV Push every 8 hours PRN Nausea and/or Vomiting

## 2023-01-25 NOTE — PROGRESS NOTE ADULT - ASSESSMENT
IMPRESSION:    Acute hypoxemic resp failure  PNA ( bl airspace disease)  ILD - UIP ?   Anemia  COPD exacerbaiton  HO COPD  HO CAD s/p PCI    RECOMMENDATIONS:    Chest CT noted; Likely superimposed pneumonia on top of ILD/UIP.   Procalcitonin negative; urine legionella negative  Check nasal MRSA, if negative DC Vancomycin.   Recommend to switch Abx to CAP coverage regimen: Rocephin and Azithromycin.  She is a former heavy smoker; Likely has COPD as well.   Continue Symbicort and Spiriva; albuterol PRN.   Continue with prednisone 40mg and taper over 10 days.   Continue Lovenox Q24h daily; Duplex of the lower extremities pending.    Outpatient PFTs, repeat CT in 4-6 weeks.   Wean off O2 and keep spo2 more than 92%. VBG noted with no hypercapnia.   Check baseline CTD workup for ILD; although unlikely; this can be done outpatient.   HOB at 45, aspiration precautions. Diet per speech and swallow.   Ambulate as tolerated.   Will follow.

## 2023-01-25 NOTE — PHARMACOTHERAPY INTERVENTION NOTE - COMMENTS
As per policy, ordered a vancomycin trough for 1/24 at 4pm since patient would be due for a trough prior to the fourth dose on 1g IV q12h.    Maura Salazar, PharmD, [facility]  Clinical Pharmacy Specialist, Infectious Diseases  Tele-Antimicrobial Stewardship Program (Tele-ASP)  Tele-ASP Phone: [phone]  
As per policy, ordered a vancomycin trough for 1/27 with AM labs since patient would be due for a trough prior to the third dose on 1g IV q24h.    Maura Salazar, PharmD, [facility]  Clinical Pharmacy Specialist, Infectious Diseases  Tele-Antimicrobial Stewardship Program (Tele-ASP)  Tele-ASP Phone: [phone]  
Recommended to adjust cefepime dose from 2g IV q12h to 2g IV q8h since the CrCl is 67 mL/min and patient is being treated empirically for pneumonia.     Maura Salazar, PharmD, [facility]  Clinical Pharmacy Specialist, Infectious Diseases  Tele-Antimicrobial Stewardship Program (Tele-ASP)  Tele-ASP Phone: [phone]  
As per policy, discontinued vancomycin trough for 1/27 since vancomycin order was discontinued.    Maura Salazar, PharmD, [facility]  Clinical Pharmacy Specialist, Infectious Diseases  Tele-Antimicrobial Stewardship Program (Tele-ASP)  Tele-ASP Phone: [phone]  
Recommended to adjust vancomycin dose from 1g IV q12h to 1g IV q24h (starting 1/25 at 10AM) since the vancomycin level today, 1/24 was 20.2 mg/L. As per PrecisePK calculations, current vancomycin AUC/SAAD is 708.8 mg/L*h. Decreasing the dose is predicted to yield an AUC of 445.84 mg/L*h.    Maura Salazar, PharmD, [facility]  Clinical Pharmacy Specialist, Infectious Diseases  Tele-Antimicrobial Stewardship Program (Tele-ASP)  Tele-ASP Phone: [phone]

## 2023-01-25 NOTE — PROGRESS NOTE ADULT - ASSESSMENT
83 yo female Pmhx Copd not home O2, Cad with stent x 1 2019, Dm, Htn presented for SOB    #RLL PNA  #COPD exacerbation  - SOB since 1 week prior to presentation associated with productive cough  - no fever or leukocytosis  - CXR:   - s/p ceftriaxone/azithromycin in ED -> c/w with same abx  - s/p solumedrol 125 mg iv in ED, solumedrol 40 BID  - urine strep and legionella (-)  - MRSA swab  - RSV-COVID-FLU negative -> will get full RVP  - c/w duoneb  - fu procal  - f/u blood culture and sputum culture  - pulm eval  - Cont cefepime 2g q8h  - Cont vancomycin 1000mg BID  - Cont prednisone 40mg qD x5d  - Atypical PNA workup  - f/u BCx  - Wean off O2 as tolerated  - PT    #anemia  - Hb 9.3 at baseline  - on IV iron supplementation?  - iron profile in AM    #chest pain  #CAD- s/p stent 2019  #HTN  - patient mentioned 1 episode of chest burning that has resolved  - chest tender to palpation -> most likely pain related to PNA  - trop <0.01 -> f/u am trops  - fu EKG  - Cont losartan 25mg qD  - Cont coreg 12.5mg BID  - Cont ASA 81mg qD  - Cont lipitor 40mg qHs  - Echo  - c/w DAPT  (patient does not know why she is on both)  - follows with Dr Leyva    #DM  - on trajenta at home  - monitor FS    #HLD  - c/w atorvastatin    #MISC  DVT PPX Lovenox  GI PPX Protonix  DIET DASH/TLC/CC  CODE DNR/DNI   81 yo female Pmhx Copd not home O2, Cad with stent x 1 2019, Dm, Htn presented for SOB    #SOB due to PNA likley GNR with underlayiung UIP  - abx as ropceihn and azithro  - steroid per pulm  - dc vanco, nasal mrsa neg  - check TTE and LE duplex     #NORMCYTIC Aanemia  - no evidence of bleeding      #chest pain likley pleuritic   #CAD- s/p stent 2019  #HTN  - Cont losartan 25mg qD  - Cont coreg 12.5mg BID  - Cont DAPT 81mg qD  - Cont lipitor 40mg qHs  - c/w DAPT   - EKG similar to previous 2 yr ago  - follows with Dr Leyva    #DM  - lantus and SS  - monitor FS    #HLD  - c/w atorvastatin    #MISC  DVT PPX Lovenox  GI PPX Protonix  DIET DASH/TLC/CC  CODE DNR/DNI

## 2023-01-26 LAB
ALBUMIN SERPL ELPH-MCNC: 3.5 G/DL — SIGNIFICANT CHANGE UP (ref 3.5–5.2)
ALP SERPL-CCNC: 64 U/L — SIGNIFICANT CHANGE UP (ref 30–115)
ALT FLD-CCNC: 17 U/L — SIGNIFICANT CHANGE UP (ref 0–41)
ANION GAP SERPL CALC-SCNC: 8 MMOL/L — SIGNIFICANT CHANGE UP (ref 7–14)
AST SERPL-CCNC: 15 U/L — SIGNIFICANT CHANGE UP (ref 0–41)
BASOPHILS # BLD AUTO: 0.02 K/UL — SIGNIFICANT CHANGE UP (ref 0–0.2)
BASOPHILS NFR BLD AUTO: 0.2 % — SIGNIFICANT CHANGE UP (ref 0–1)
BILIRUB SERPL-MCNC: <0.2 MG/DL — SIGNIFICANT CHANGE UP (ref 0.2–1.2)
BUN SERPL-MCNC: 23 MG/DL — HIGH (ref 10–20)
CALCIUM SERPL-MCNC: 9.3 MG/DL — SIGNIFICANT CHANGE UP (ref 8.4–10.5)
CHLORIDE SERPL-SCNC: 108 MMOL/L — SIGNIFICANT CHANGE UP (ref 98–110)
CO2 SERPL-SCNC: 24 MMOL/L — SIGNIFICANT CHANGE UP (ref 17–32)
CREAT SERPL-MCNC: 0.9 MG/DL — SIGNIFICANT CHANGE UP (ref 0.7–1.5)
EGFR: 64 ML/MIN/1.73M2 — SIGNIFICANT CHANGE UP
EOSINOPHIL # BLD AUTO: 0.19 K/UL — SIGNIFICANT CHANGE UP (ref 0–0.7)
EOSINOPHIL NFR BLD AUTO: 1.9 % — SIGNIFICANT CHANGE UP (ref 0–8)
GLUCOSE BLDC GLUCOMTR-MCNC: 141 MG/DL — HIGH (ref 70–99)
GLUCOSE BLDC GLUCOMTR-MCNC: 141 MG/DL — HIGH (ref 70–99)
GLUCOSE BLDC GLUCOMTR-MCNC: 194 MG/DL — HIGH (ref 70–99)
GLUCOSE BLDC GLUCOMTR-MCNC: 98 MG/DL — SIGNIFICANT CHANGE UP (ref 70–99)
GLUCOSE SERPL-MCNC: 88 MG/DL — SIGNIFICANT CHANGE UP (ref 70–99)
HCT VFR BLD CALC: 32.8 % — LOW (ref 37–47)
HGB BLD-MCNC: 9.1 G/DL — LOW (ref 12–16)
IMM GRANULOCYTES NFR BLD AUTO: 0.5 % — HIGH (ref 0.1–0.3)
LYMPHOCYTES # BLD AUTO: 2.62 K/UL — SIGNIFICANT CHANGE UP (ref 1.2–3.4)
LYMPHOCYTES # BLD AUTO: 26.1 % — SIGNIFICANT CHANGE UP (ref 20.5–51.1)
MAGNESIUM SERPL-MCNC: 2.3 MG/DL — SIGNIFICANT CHANGE UP (ref 1.8–2.4)
MCHC RBC-ENTMCNC: 23.4 PG — LOW (ref 27–31)
MCHC RBC-ENTMCNC: 27.7 G/DL — LOW (ref 32–37)
MCV RBC AUTO: 84.3 FL — SIGNIFICANT CHANGE UP (ref 81–99)
MONOCYTES # BLD AUTO: 0.86 K/UL — HIGH (ref 0.1–0.6)
MONOCYTES NFR BLD AUTO: 8.6 % — SIGNIFICANT CHANGE UP (ref 1.7–9.3)
NEUTROPHILS # BLD AUTO: 6.31 K/UL — SIGNIFICANT CHANGE UP (ref 1.4–6.5)
NEUTROPHILS NFR BLD AUTO: 62.7 % — SIGNIFICANT CHANGE UP (ref 42.2–75.2)
NRBC # BLD: 0 /100 WBCS — SIGNIFICANT CHANGE UP (ref 0–0)
PHOSPHATE SERPL-MCNC: 3.4 MG/DL — SIGNIFICANT CHANGE UP (ref 2.1–4.9)
PLATELET # BLD AUTO: 607 K/UL — HIGH (ref 130–400)
POTASSIUM SERPL-MCNC: 4.2 MMOL/L — SIGNIFICANT CHANGE UP (ref 3.5–5)
POTASSIUM SERPL-SCNC: 4.2 MMOL/L — SIGNIFICANT CHANGE UP (ref 3.5–5)
PROT SERPL-MCNC: 5.6 G/DL — LOW (ref 6–8)
RBC # BLD: 3.89 M/UL — LOW (ref 4.2–5.4)
RBC # FLD: 19.4 % — HIGH (ref 11.5–14.5)
SODIUM SERPL-SCNC: 140 MMOL/L — SIGNIFICANT CHANGE UP (ref 135–146)
WBC # BLD: 10.05 K/UL — SIGNIFICANT CHANGE UP (ref 4.8–10.8)
WBC # FLD AUTO: 10.05 K/UL — SIGNIFICANT CHANGE UP (ref 4.8–10.8)

## 2023-01-26 PROCEDURE — 99232 SBSQ HOSP IP/OBS MODERATE 35: CPT

## 2023-01-26 RX ADMIN — Medication 1200 MILLIGRAM(S): at 17:30

## 2023-01-26 RX ADMIN — Medication 40 MILLIGRAM(S): at 06:33

## 2023-01-26 RX ADMIN — Medication 650 MILLIGRAM(S): at 21:27

## 2023-01-26 RX ADMIN — PANTOPRAZOLE SODIUM 40 MILLIGRAM(S): 20 TABLET, DELAYED RELEASE ORAL at 06:37

## 2023-01-26 RX ADMIN — Medication 650 MILLIGRAM(S): at 12:30

## 2023-01-26 RX ADMIN — GABAPENTIN 600 MILLIGRAM(S): 400 CAPSULE ORAL at 06:33

## 2023-01-26 RX ADMIN — Medication 650 MILLIGRAM(S): at 20:52

## 2023-01-26 RX ADMIN — Medication 2: at 17:26

## 2023-01-26 RX ADMIN — Medication 3 MILLILITER(S): at 21:44

## 2023-01-26 RX ADMIN — Medication 81 MILLIGRAM(S): at 11:52

## 2023-01-26 RX ADMIN — INSULIN GLARGINE 15 UNIT(S): 100 INJECTION, SOLUTION SUBCUTANEOUS at 21:25

## 2023-01-26 RX ADMIN — CARVEDILOL PHOSPHATE 12.5 MILLIGRAM(S): 80 CAPSULE, EXTENDED RELEASE ORAL at 06:34

## 2023-01-26 RX ADMIN — Medication 3 MILLILITER(S): at 14:01

## 2023-01-26 RX ADMIN — Medication 5 MILLIGRAM(S): at 17:30

## 2023-01-26 RX ADMIN — CEFTRIAXONE 100 MILLIGRAM(S): 500 INJECTION, POWDER, FOR SOLUTION INTRAMUSCULAR; INTRAVENOUS at 13:37

## 2023-01-26 RX ADMIN — Medication 5 MILLIGRAM(S): at 06:35

## 2023-01-26 RX ADMIN — Medication 3 MILLILITER(S): at 08:25

## 2023-01-26 RX ADMIN — Medication 1 MILLIGRAM(S): at 17:33

## 2023-01-26 RX ADMIN — Medication 650 MILLIGRAM(S): at 11:57

## 2023-01-26 RX ADMIN — Medication 1200 MILLIGRAM(S): at 06:35

## 2023-01-26 RX ADMIN — CARVEDILOL PHOSPHATE 12.5 MILLIGRAM(S): 80 CAPSULE, EXTENDED RELEASE ORAL at 17:31

## 2023-01-26 RX ADMIN — ENOXAPARIN SODIUM 40 MILLIGRAM(S): 100 INJECTION SUBCUTANEOUS at 11:51

## 2023-01-26 RX ADMIN — LOSARTAN POTASSIUM 25 MILLIGRAM(S): 100 TABLET, FILM COATED ORAL at 06:35

## 2023-01-26 RX ADMIN — ATORVASTATIN CALCIUM 40 MILLIGRAM(S): 80 TABLET, FILM COATED ORAL at 21:26

## 2023-01-26 RX ADMIN — Medication 1 MILLIGRAM(S): at 11:55

## 2023-01-26 RX ADMIN — CLOPIDOGREL BISULFATE 75 MILLIGRAM(S): 75 TABLET, FILM COATED ORAL at 11:51

## 2023-01-26 RX ADMIN — AZITHROMYCIN 255 MILLIGRAM(S): 500 TABLET, FILM COATED ORAL at 11:47

## 2023-01-26 RX ADMIN — RISPERIDONE 1 MILLIGRAM(S): 4 TABLET ORAL at 21:26

## 2023-01-26 RX ADMIN — SERTRALINE 100 MILLIGRAM(S): 25 TABLET, FILM COATED ORAL at 12:22

## 2023-01-26 RX ADMIN — GABAPENTIN 600 MILLIGRAM(S): 400 CAPSULE ORAL at 17:29

## 2023-01-26 NOTE — CONSULT NOTE ADULT - ASSESSMENT
history CAD  PCI LAD   COPD     DM   presents with COPD exac         echo   EF  40-45   wall motion abnormalities        new changes   no chest pain    Valdo neg        rec nuclear  stress test    can be done outpatient

## 2023-01-26 NOTE — PROGRESS NOTE ADULT - SUBJECTIVE AND OBJECTIVE BOX
KIMBER BARBER 82y Female  MRN#: [id number]     Hospital Day: 3d    CC:  DYSPNEA; COPD EXACERBATION; CHEST PAIN    HOSPITAL COURSE:   82F PMH: COPD (not home O2), HTN, CAD (stentx1 ), NID-DM. Presented  for SOB x1wkm- Initially sudden onset mostly on exertion associated with productive cough of whitish sputum.  pt reports 1x episode of midsternal burning chest pain, non radiating, ~5 mins, not related to exertions, worsened with chest palpation and resolved by itself. SOB progressed so she decided to present to the ED. ROS (+) for  chills (-) for fever, sore throat, rhinorrhea, congestion, headache, myalgia, sick contacts, chest pain, abdominal pain, N/V, diarrhea or dysuria.    VS: Requiring 2L NC, otherwise afebrile, normotensive, nontachycardic  Labs: (-) Leukocytosis, Procal WNL, Trop (-), , VBG pH/pCO2/pO2/HCO3 (WNL), UA (-), COVID/FLU/RSV (-), Legionella (-), BCx NGTD  CXR: Extensive bilateral opacities, EKG: NSR w/ T-wave abnormality (present on previous)  CT Chest : Bilateral peripheral and basilar distributed honeycombing with bronchiolectasis, compatible with UIP; Superimposed areas of consolidation compatible with PNA; Extensive coronary artery calcifications; Left ventricular apex aneurysm. Diminished attenuation with  Patient received solumedrol 125 mg IV, ceftriaxone/azithromycin and admitted to [facility] for further management    SUBJECTIVE     Overnight events  None    Subjective complaints                                               ----------------------------------------------------------  OBJECTIVE  PAST MEDICAL & SURGICAL HISTORY  DM (diabetes mellitus)    CAD (coronary artery disease)    HLD (hyperlipidemia)    HTN (hypertension)    Neuropathy    COPD (chronic obstructive pulmonary disease)                                              -----------------------------------------------------------  ALLERGIES:  No Known Allergies                                            ------------------------------------------------------------    HOME MEDICATIONS  Home Medications:  albuterol 90 mcg/inh inhalation aerosol: 2 puff(s) inhaled every 6 hours, As Needed (2023 02:10)  aspirin 81 mg oral delayed release tablet: 1 tab(s) orally once a day (2023 02:10)  carvedilol 25 mg oral tablet: 0.5 tab(s) orally 2 times a day (2023 02:10)  dicyclomine 20 mg oral tablet: 1 tab(s) orally 3 times a day, As Needed (2023 02:10)  gabapentin 600 mg oral tablet: 1 tab(s) orally 2 times a day (2023 02:10)  losartan 25 mg oral tablet: 1 tab(s) orally once a day (2023 02:10)  pantoprazole 40 mg oral delayed release tablet: 1 tab(s) orally once a day (2023 02:10)  risperiDONE 1 mg oral tablet: 1 tab(s) orally once a day (at bedtime) (2023 02:10)  solifenacin 5 mg oral tablet: 1 tab(s) orally once a day (2023 02:10)  Tradjenta 5 mg oral tablet: 1 tab(s) orally once a day (2023 02:10)  Trelegy Ellipta 200 mcg-62.5 mcg-25 mcg/inh inhalation powder: 1 puff(s) inhaled once a day (2023 02:10)  Xanax 1 mg oral tablet: 1 tab(s) orally 2 times a day (2023 02:10)  Zoloft 100 mg oral tablet: 1 tab(s) orally once a day (2023 02:10)                           MEDICATIONS:  STANDING MEDICATIONS  albuterol/ipratropium for Nebulization 3 milliLiter(s) Nebulizer every 6 hours  ALPRAZolam 1 milliGRAM(s) Oral two times a day  aspirin enteric coated 81 milliGRAM(s) Oral daily  atorvastatin 40 milliGRAM(s) Oral at bedtime  azithromycin  IVPB      azithromycin  IVPB 500 milliGRAM(s) IV Intermittent every 24 hours  budesonide 160 MICROgram(s)/formoterol 4.5 MICROgram(s) Inhaler 2 Puff(s) Inhalation two times a day  carvedilol 12.5 milliGRAM(s) Oral every 12 hours  cefTRIAXone   IVPB 1000 milliGRAM(s) IV Intermittent every 24 hours  clopidogrel Tablet 75 milliGRAM(s) Oral daily  dextrose 5%. 1000 milliLiter(s) IV Continuous <Continuous>  dextrose 50% Injectable 25 Gram(s) IV Push once  enoxaparin Injectable 40 milliGRAM(s) SubCutaneous every 24 hours  gabapentin 600 milliGRAM(s) Oral two times a day  glucagon  Injectable 1 milliGRAM(s) IntraMuscular once  guaiFENesin ER 1200 milliGRAM(s) Oral every 12 hours  insulin glargine Injectable (LANTUS) 15 Unit(s) SubCutaneous at bedtime  insulin lispro (ADMELOG) corrective regimen sliding scale   SubCutaneous three times a day before meals  losartan 25 milliGRAM(s) Oral daily  oxybutynin 5 milliGRAM(s) Oral two times a day  pantoprazole    Tablet 40 milliGRAM(s) Oral before breakfast  predniSONE   Tablet 40 milliGRAM(s) Oral daily  risperiDONE   Tablet 1 milliGRAM(s) Oral at bedtime  sertraline 100 milliGRAM(s) Oral daily  tiotropium 2.5 MICROgram(s) Inhaler 2 Puff(s) Inhalation daily    PRN MEDICATIONS  acetaminophen     Tablet .. 650 milliGRAM(s) Oral every 6 hours PRN  aluminum hydroxide/magnesium hydroxide/simethicone Suspension 30 milliLiter(s) Oral every 4 hours PRN  dextrose Oral Gel 15 Gram(s) Oral once PRN  dicyclomine 20 milliGRAM(s) Oral three times a day before meals PRN  melatonin 3 milliGRAM(s) Oral at bedtime PRN  ondansetron Injectable 4 milliGRAM(s) IV Push every 8 hours PRN                                            ------------------------------------------------------------  VITAL SIGNS: Last 24 Hours  T(C): 35.6 (2023 05:00), Max: 36.4 (2023 14:49)  T(F): 96.1 (2023 05:00), Max: 97.6 (2023 14:49)  HR: 62 (2023 05:00) (62 - 88)  BP: 123/56 (2023 05:00) (105/55 - 123/56)  BP(mean): --  RR: 18 (2023 05:00) (18 - 18)  SpO2: 98% (2023 05:00) (98% - 98%)                                             --------------------------------------------------------------  LABS:                        8.8    9.64  )-----------( 594      ( 2023 06:52 )             31.8         141  |  108  |  23<H>  ----------------------------<  86  4.5   |  22  |  1.0    Ca    9.3      2023 06:52  Phos  3.4       Mg     2.3         TPro  5.6<L>  /  Alb  3.5  /  TBili  <0.2  /  DBili  x   /  AST  15  /  ALT  15  /  AlkPhos  62  25      Urinalysis Basic - ( 2023 10:15 )    Color: Light Yellow / Appearance: Clear / S.013 / pH: x  Gluc: x / Ketone: Trace  / Bili: Negative / Urobili: <2 mg/dL   Blood: x / Protein: Trace / Nitrite: Negative   Leuk Esterase: Small / RBC: 2 /HPF / WBC 3 /HPF   Sq Epi: x / Non Sq Epi: 7 /HPF / Bacteria: Negative              Culture - Blood (collected 2023 06:31)  Source: .Blood None  Preliminary Report (2023 18:02):    No growth to date.                                                    -------------------------------------------------------------  RADIOLOGY:                                            --------------------------------------------------------------    PHYSICAL EXAM:                                             --------------------------------------------------------------                 KIMBER BARBER 82y Female  MRN#: [id number]     Hospital Day: 3d    CC:  HFmrEF, DYSPNEA; COPD EXACERBATION; CHEST PAIN    HOSPITAL COURSE:   82F PMH: COPD (not home O2), HTN, CAD (stentx1 ) (follows w/ Dr. Rolon), NID-DM. Presented  for SOB x1wkm- Initially sudden onset mostly on exertion associated with productive cough of whitish sputum.  pt reports 1x episode of midsternal burning chest pain, non radiating, ~5 mins, not related to exertions, worsened with chest palpation and resolved by itself. SOB progressed so she decided to present to the ED. ROS (+) for  chills (-) for fever, sore throat, rhinorrhea, congestion, headache, myalgia, sick contacts, chest pain, abdominal pain, N/V, diarrhea or dysuria.    VS: Requiring 2L NC, otherwise afebrile, normotensive, nontachycardic  Labs: (-) Leukocytosis, Procal WNL, Trop (-), , VBG pH/pCO2/pO2/HCO3 (WNL), UA (-), COVID/FLU/RSV (-), Legionella (-), BCx NGTD  CXR: Extensive bilateral opacities, EKG: NSR w/ T-wave abnormality (present on previous)  CT Chest : Bilateral peripheral and basilar distributed honeycombing with bronchiolectasis, compatible with UIP; Superimposed areas of consolidation compatible with PNA; Extensive coronary artery calcifications; Left ventricular apex aneurysm  Patient received solumedrol 125 mg IV ceftriaxone/azithromycin, Vancomycin -, cefepime -. Pulm following.  Recommended switching abx azithro/roceph, symbicourt/spiriva/albuterol PRN, pred 40mg taper over 10d, OP PFTs, repeat CT 4-6wks  Echo : EF 40-45% w/ wall motion abnormalities.     SUBJECTIVE     Overnight events  None    Subjective complaints                                               ----------------------------------------------------------  OBJECTIVE  PAST MEDICAL & SURGICAL HISTORY  DM (diabetes mellitus)    CAD (coronary artery disease)    HLD (hyperlipidemia)    HTN (hypertension)    Neuropathy    COPD (chronic obstructive pulmonary disease)                                              -----------------------------------------------------------  ALLERGIES:  No Known Allergies                                            ------------------------------------------------------------    HOME MEDICATIONS  Home Medications:  albuterol 90 mcg/inh inhalation aerosol: 2 puff(s) inhaled every 6 hours, As Needed (2023 02:10)  aspirin 81 mg oral delayed release tablet: 1 tab(s) orally once a day (:10)  carvedilol 25 mg oral tablet: 0.5 tab(s) orally 2 times a day (2023 02:10)  dicyclomine 20 mg oral tablet: 1 tab(s) orally 3 times a day, As Needed (:10)  gabapentin 600 mg oral tablet: 1 tab(s) orally 2 times a day (:10)  losartan 25 mg oral tablet: 1 tab(s) orally once a day (2023 02:10)  pantoprazole 40 mg oral delayed release tablet: 1 tab(s) orally once a day (:10)  risperiDONE 1 mg oral tablet: 1 tab(s) orally once a day (at bedtime) (:10)  solifenacin 5 mg oral tablet: 1 tab(s) orally once a day (:10)  Tradjenta 5 mg oral tablet: 1 tab(s) orally once a day (:10)  Trelegy Ellipta 200 mcg-62.5 mcg-25 mcg/inh inhalation powder: 1 puff(s) inhaled once a day (2023 02:10)  Xanax 1 mg oral tablet: 1 tab(s) orally 2 times a day (2023 02:10)  Zoloft 100 mg oral tablet: 1 tab(s) orally once a day (2023 02:10)                           MEDICATIONS:  STANDING MEDICATIONS  albuterol/ipratropium for Nebulization 3 milliLiter(s) Nebulizer every 6 hours  ALPRAZolam 1 milliGRAM(s) Oral two times a day  aspirin enteric coated 81 milliGRAM(s) Oral daily  atorvastatin 40 milliGRAM(s) Oral at bedtime  azithromycin  IVPB      azithromycin  IVPB 500 milliGRAM(s) IV Intermittent every 24 hours  budesonide 160 MICROgram(s)/formoterol 4.5 MICROgram(s) Inhaler 2 Puff(s) Inhalation two times a day  carvedilol 12.5 milliGRAM(s) Oral every 12 hours  cefTRIAXone   IVPB 1000 milliGRAM(s) IV Intermittent every 24 hours  clopidogrel Tablet 75 milliGRAM(s) Oral daily  dextrose 5%. 1000 milliLiter(s) IV Continuous <Continuous>  dextrose 50% Injectable 25 Gram(s) IV Push once  enoxaparin Injectable 40 milliGRAM(s) SubCutaneous every 24 hours  gabapentin 600 milliGRAM(s) Oral two times a day  glucagon  Injectable 1 milliGRAM(s) IntraMuscular once  guaiFENesin ER 1200 milliGRAM(s) Oral every 12 hours  insulin glargine Injectable (LANTUS) 15 Unit(s) SubCutaneous at bedtime  insulin lispro (ADMELOG) corrective regimen sliding scale   SubCutaneous three times a day before meals  losartan 25 milliGRAM(s) Oral daily  oxybutynin 5 milliGRAM(s) Oral two times a day  pantoprazole    Tablet 40 milliGRAM(s) Oral before breakfast  predniSONE   Tablet 40 milliGRAM(s) Oral daily  risperiDONE   Tablet 1 milliGRAM(s) Oral at bedtime  sertraline 100 milliGRAM(s) Oral daily  tiotropium 2.5 MICROgram(s) Inhaler 2 Puff(s) Inhalation daily    PRN MEDICATIONS  acetaminophen     Tablet .. 650 milliGRAM(s) Oral every 6 hours PRN  aluminum hydroxide/magnesium hydroxide/simethicone Suspension 30 milliLiter(s) Oral every 4 hours PRN  dextrose Oral Gel 15 Gram(s) Oral once PRN  dicyclomine 20 milliGRAM(s) Oral three times a day before meals PRN  melatonin 3 milliGRAM(s) Oral at bedtime PRN  ondansetron Injectable 4 milliGRAM(s) IV Push every 8 hours PRN                                            ------------------------------------------------------------  VITAL SIGNS: Last 24 Hours  T(C): 35.6 (2023 05:00), Max: 36.4 (2023 14:49)  T(F): 96.1 (2023 05:00), Max: 97.6 (2023 14:49)  HR: 62 (2023 05:00) (62 - 88)  BP: 123/56 (2023 05:00) (105/55 - 123/56)  BP(mean): --  RR: 18 (2023 05:00) (18 - 18)  SpO2: 98% (2023 05:00) (98% - 98%)                                             --------------------------------------------------------------  LABS:                        8.8    9.64  )-----------( 594      ( 2023 06:52 )             31.8     01-25    141  |  108  |  23<H>  ----------------------------<  86  4.5   |  22  |  1.0    Ca    9.3      2023 06:52  Phos  3.4       Mg     2.3         TPro  5.6<L>  /  Alb  3.5  /  TBili  <0.2  /  DBili  x   /  AST  15  /  ALT  15  /  AlkPhos  62  01-25      Urinalysis Basic - ( 2023 10:15 )    Color: Light Yellow / Appearance: Clear / S.013 / pH: x  Gluc: x / Ketone: Trace  / Bili: Negative / Urobili: <2 mg/dL   Blood: x / Protein: Trace / Nitrite: Negative   Leuk Esterase: Small / RBC: 2 /HPF / WBC 3 /HPF   Sq Epi: x / Non Sq Epi: 7 /HPF / Bacteria: Negative              Culture - Blood (collected 2023 06:31)  Source: .Blood None  Preliminary Report (2023 18:02):    No growth to date.                                                    -------------------------------------------------------------  RADIOLOGY:                                            --------------------------------------------------------------    PHYSICAL EXAM:                                             --------------------------------------------------------------                 KIMBER BARBER 82y Female  MRN#: [id number]     Hospital Day: 3d    CC:  HFmrEF, DYSPNEA; COPD EXACERBATION; CHEST PAIN    HOSPITAL COURSE:   82F PMH: COPD (not home O2), HTN, CAD (stentx1 ) (follows w/ Dr. Rolon), NID-DM. Presented  for SOB x1wkm- Initially sudden onset mostly on exertion associated with productive cough of whitish sputum.  pt reports 1x episode of midsternal burning chest pain, non radiating, ~5 mins, not related to exertions, worsened with chest palpation and resolved by itself. SOB progressed so she decided to present to the ED. ROS (+) for  chills (-) for fever, sore throat, rhinorrhea, congestion, headache, myalgia, sick contacts, chest pain, abdominal pain, N/V, diarrhea or dysuria.    VS: Requiring 2L NC, otherwise afebrile, normotensive, nontachycardic  Labs: (-) Leukocytosis, Procal WNL, Trop (-), , VBG pH/pCO2/pO2/HCO3 (WNL), UA (-), COVID/FLU/RSV (-), Legionella (-), BCx NGTD  CXR: Extensive bilateral opacities, EKG: NSR w/ T-wave abnormality (present on previous)  CT Chest : Bilateral peripheral and basilar distributed honeycombing with bronchiolectasis, compatible with UIP; Superimposed areas of consolidation compatible with PNA; Extensive coronary artery calcifications; Left ventricular apex aneurysm  Patient received solumedrol 125 mg IV ceftriaxone/azithromycin, Vancomycin -, cefepime -. Pulm following.  Recommended switching abx azithro/roceph, symbicourt/spiriva/albuterol PRN, pred 40mg taper over 10d, OP PFTs, repeat CT 4-6wks  Echo : EF 40-45% w/ wall motion abnormalities.     SUBJECTIVE     Overnight events  None    Subjective complaints                                               ----------------------------------------------------------  OBJECTIVE  PAST MEDICAL & SURGICAL HISTORY  DM (diabetes mellitus)    CAD (coronary artery disease)    HLD (hyperlipidemia)    HTN (hypertension)    Neuropathy    COPD (chronic obstructive pulmonary disease)                                              -----------------------------------------------------------  ALLERGIES:  No Known Allergies                                            ------------------------------------------------------------    HOME MEDICATIONS  Home Medications:  albuterol 90 mcg/inh inhalation aerosol: 2 puff(s) inhaled every 6 hours, As Needed (2023 02:10)  aspirin 81 mg oral delayed release tablet: 1 tab(s) orally once a day (:10)  carvedilol 25 mg oral tablet: 0.5 tab(s) orally 2 times a day (2023 02:10)  dicyclomine 20 mg oral tablet: 1 tab(s) orally 3 times a day, As Needed (:10)  gabapentin 600 mg oral tablet: 1 tab(s) orally 2 times a day (:10)  losartan 25 mg oral tablet: 1 tab(s) orally once a day (2023 02:10)  pantoprazole 40 mg oral delayed release tablet: 1 tab(s) orally once a day (:10)  risperiDONE 1 mg oral tablet: 1 tab(s) orally once a day (at bedtime) (:10)  solifenacin 5 mg oral tablet: 1 tab(s) orally once a day (:10)  Tradjenta 5 mg oral tablet: 1 tab(s) orally once a day (:10)  Trelegy Ellipta 200 mcg-62.5 mcg-25 mcg/inh inhalation powder: 1 puff(s) inhaled once a day (2023 02:10)  Xanax 1 mg oral tablet: 1 tab(s) orally 2 times a day (2023 02:10)  Zoloft 100 mg oral tablet: 1 tab(s) orally once a day (2023 02:10)                           MEDICATIONS:  STANDING MEDICATIONS  albuterol/ipratropium for Nebulization 3 milliLiter(s) Nebulizer every 6 hours  ALPRAZolam 1 milliGRAM(s) Oral two times a day  aspirin enteric coated 81 milliGRAM(s) Oral daily  atorvastatin 40 milliGRAM(s) Oral at bedtime  azithromycin  IVPB      azithromycin  IVPB 500 milliGRAM(s) IV Intermittent every 24 hours  budesonide 160 MICROgram(s)/formoterol 4.5 MICROgram(s) Inhaler 2 Puff(s) Inhalation two times a day  carvedilol 12.5 milliGRAM(s) Oral every 12 hours  cefTRIAXone   IVPB 1000 milliGRAM(s) IV Intermittent every 24 hours  clopidogrel Tablet 75 milliGRAM(s) Oral daily  dextrose 5%. 1000 milliLiter(s) IV Continuous <Continuous>  dextrose 50% Injectable 25 Gram(s) IV Push once  enoxaparin Injectable 40 milliGRAM(s) SubCutaneous every 24 hours  gabapentin 600 milliGRAM(s) Oral two times a day  glucagon  Injectable 1 milliGRAM(s) IntraMuscular once  guaiFENesin ER 1200 milliGRAM(s) Oral every 12 hours  insulin glargine Injectable (LANTUS) 15 Unit(s) SubCutaneous at bedtime  insulin lispro (ADMELOG) corrective regimen sliding scale   SubCutaneous three times a day before meals  losartan 25 milliGRAM(s) Oral daily  oxybutynin 5 milliGRAM(s) Oral two times a day  pantoprazole    Tablet 40 milliGRAM(s) Oral before breakfast  predniSONE   Tablet 40 milliGRAM(s) Oral daily  risperiDONE   Tablet 1 milliGRAM(s) Oral at bedtime  sertraline 100 milliGRAM(s) Oral daily  tiotropium 2.5 MICROgram(s) Inhaler 2 Puff(s) Inhalation daily    PRN MEDICATIONS  acetaminophen     Tablet .. 650 milliGRAM(s) Oral every 6 hours PRN  aluminum hydroxide/magnesium hydroxide/simethicone Suspension 30 milliLiter(s) Oral every 4 hours PRN  dextrose Oral Gel 15 Gram(s) Oral once PRN  dicyclomine 20 milliGRAM(s) Oral three times a day before meals PRN  melatonin 3 milliGRAM(s) Oral at bedtime PRN  ondansetron Injectable 4 milliGRAM(s) IV Push every 8 hours PRN                                            ------------------------------------------------------------  VITAL SIGNS: Last 24 Hours  T(C): 35.6 (2023 05:00), Max: 36.4 (2023 14:49)  T(F): 96.1 (2023 05:00), Max: 97.6 (2023 14:49)  HR: 62 (2023 05:00) (62 - 88)  BP: 123/56 (2023 05:00) (105/55 - 123/56)  BP(mean): --  RR: 18 (2023 05:00) (18 - 18)  SpO2: 98% (2023 05:00) (98% - 98%)                                             --------------------------------------------------------------  LABS:                        8.8    9.64  )-----------( 594      ( 2023 06:52 )             31.8     01-25    141  |  108  |  23<H>  ----------------------------<  86  4.5   |  22  |  1.0    Ca    9.3      2023 06:52  Phos  3.4       Mg     2.3         TPro  5.6<L>  /  Alb  3.5  /  TBili  <0.2  /  DBili  x   /  AST  15  /  ALT  15  /  AlkPhos  62  01-25      Urinalysis Basic - ( 2023 10:15 )    Color: Light Yellow / Appearance: Clear / S.013 / pH: x  Gluc: x / Ketone: Trace  / Bili: Negative / Urobili: <2 mg/dL   Blood: x / Protein: Trace / Nitrite: Negative   Leuk Esterase: Small / RBC: 2 /HPF / WBC 3 /HPF   Sq Epi: x / Non Sq Epi: 7 /HPF / Bacteria: Negative              Culture - Blood (collected 2023 06:31)  Source: .Blood None  Preliminary Report (2023 18:02):    No growth to date.                                                    -------------------------------------------------------------  RADIOLOGY:                < from: TTE Echo Complete w/ Contrast w/ Doppler (23 @ 10:12) >  PHYSICIAN INTERPRETATION:  Left Ventricle: Endocardial visualization was enhanced with intravenous   echo contrast. Global LV systolic function was mildly decreased. Left   ventricular ejection fraction, by visual estimation, is 40 to 45%.   Spectral Doppler shows impaired relaxation pattern of left ventricular   myocardial filling (Grade I diastolic dysfunction). Normal LV filling   pressures. Ultrasound enhancing agent swirling at the apex with no   definite evidence of thrombus.      LV Wall Scoring:  The entire apex is dyskinetic. The basal and mid inferolateral wall is  hypokinetic. All remaining scored segments are normal.    < end of copied text >                              --------------------------------------------------------------    PHYSICAL EXAM:             CONSTITUTIONAL: No acute distress, well-developed, well-groomed, AAOx3  HEAD: Atraumatic, normocephalic  EYES: EOM intact, PERRLA, conjunctiva and sclera clear  ENT: Supple, no masses, no thyromegaly, no bruits, no JVD; moist mucous membranes  PULMONARY: Clear to auscultation bilaterally; no wheezes, rales, or rhonchi  CARDIOVASCULAR: Regular rate and rhythm; no murmurs, rubs, or gallops  GASTROINTESTINAL: Soft, non-tender, non-distended; bowel sounds present  MUSCULOSKELETAL: 2+ peripheral pulses; no clubbing, no cyanosis, no edema  NEUROLOGY: non-focal  SKIN: No rashes or lesions; warm and dry                                --------------------------------------------------------------

## 2023-01-26 NOTE — PROGRESS NOTE ADULT - ASSESSMENT
83 yo female Pmhx Copd not home O2, Cad with stent x 1 2019, Dm, Htn presented for SOB    #RLL PNA  #COPD exacerbation  - SOB since 1 week prior to presentation associated with productive cough  - CXR: < from: Xray Chest 1 View- PORTABLE-Urgent (01.22.23 @ 23:09) >Extensive bilateral lung opacities. < end of copied text >  - < from: CT Chest No Cont (01.23.23 @ 17:43) >Bilateral peripheral and basilar distributed honeycombing with bronchiolectasis,, compatible with UIP.Superimposed areas of consolidation, right lower lobe and right middle lobes, compatible with pneumonia. Extensive coronary artery calcifications. 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 Diminished attenuation within cardiac chambers, suggesting underlying anemia.< end of copied text >  - < from: VA Duplex Lower Ext Vein Scan, Bilat (01.25.23 @ 08:28) >No evidence of deep venous thrombosis in either lower extremity. < end of copied text >  - 1/23 urine strep and legionella (-), Procal (-), MRSA (-), RSV-COVID-FLU negative, 1/23 BCx NGTD, VBG noted with no hypercapnia   - s/p Vancomycin 1/23-1/25, cefepime 1/23-1/25, ceftriaxone/azithromycin in ED -> c/w with same abx  - s/p solumedrol 125 mg iv in ED, solumedrol 40 BID  - c/w duoneb  - f/u sputum culture  - Pulm following recs noted: switching abx azithro/roceph, symbicourt/spiriva/albuterol PRN, pred 40mg taper over 10d, OP PFTs, repeat CT 4-6wks  - Goal O2 >92%  - Ceftriaxone 1/25-present, azithromycin 1/25-present  - Cont prednisone 40mg qD x10d taper  - Wean off O2 as tolerated  - PT eval pending    #anemia  - Hb 9.3 at baseline  - on IV iron supplementation?  - iron profile in AM: %sat 10    #chest pain  #CAD- s/p stent 2019  #HTN  - patient mentioned 1 episode of chest burning that has resolved  - chest tender to palpation -> most likely pain related to PNA  - trop <0.01 -> f/u am trops  - fu EKG  - Cont losartan 25mg qD  - Cont coreg 12.5mg BID  - Cont ASA 81mg qD  - Cont lipitor 40mg qHs  - Echo < from: TTE Echo Complete w/ Contrast w/ Doppler (01.25.23 @ 10:12) >   40 to   45%.   2. Mildly decreased global left ventricular systolic function.   3. Entire apex and basal and mid inferolateral wall are abnormal as   described above.   4. Endocardial visualization was enhanced with intravenous echo contrast.   5. Ultrasound enhancing agent swirling at the apex with no definite   evidence of thrombus.   6. Spectral Doppler shows impaired relaxation pattern of left   ventricular myocardial filling (Grade I diastolic dysfunction).   7. Mild mitral annular calcification.   8. Mild thickening of the anterior and posterior mitral valve leaflets.   9. Trace mitral valve regurgitation.  10. Normal left atrial size.  11. Normal right atrial size.  - c/w DAPT  (patient does not know why she is on both)  - follows with Dr Leyva    #DM  - on trajenta at home  - monitor FS    #HLD  - c/w atorvastatin    #MISC  DVT PPX Lovenox  GI PPX Protonix  DIET DASH/TLC/CC  CODE DNR/DNI     83 yo female Pmhx Copd not home O2, Cad with stent x 1 2019, Dm, Htn presented for SOB    #RLL PNA  #COPD exacerbation  - SOB since 1 week prior to presentation associated with productive cough  - CXR: < from: Xray Chest 1 View- PORTABLE-Urgent (01.22.23 @ 23:09) >Extensive bilateral lung opacities. < end of copied text >  - < from: CT Chest No Cont (01.23.23 @ 17:43) >Bilateral peripheral and basilar distributed honeycombing with bronchiolectasis,, compatible with UIP.Superimposed areas of consolidation, right lower lobe and right middle lobes, compatible with pneumonia. Extensive coronary artery calcifications. 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 Diminished attenuation within cardiac chambers, suggesting underlying anemia.< end of copied text >  - < from: VA Duplex Lower Ext Vein Scan, Bilat (01.25.23 @ 08:28) >No evidence of deep venous thrombosis in either lower extremity. < end of copied text >  - 1/23 urine strep and legionella (-), Procal (-), MRSA (-), RSV-COVID-FLU negative, 1/23 BCx NGTD, VBG noted with no hypercapnia   - s/p Vancomycin 1/23-1/25, cefepime 1/23-1/25, ceftriaxone/azithromycin in ED -> c/w with same abx  - s/p solumedrol 125 mg iv in ED, solumedrol 40 BID  - c/w duoneb  - f/u sputum culture  - Pulm following recs noted: switching abx azithro/roceph, symbicourt/spiriva/albuterol PRN, pred 40mg taper over 10d, OP PFTs, repeat CT 4-6wks  - Goal O2 >92%  - Ceftriaxone 1/25-present, azithromycin 1/25-present  - Cont prednisone 40mg qD x10d taper (1/23-2/1)  - Wean off O2 as tolerated  - PT eval pending    #anemia  - Hb 9.3 at baseline  - on IV iron supplementation?  - iron profile in AM: %sat 10    #chest pain  #CAD- s/p stent 2019  #HTN  - patient mentioned 1 episode of chest burning that has resolved  - chest tender to palpation -> most likely pain related to PNA  - trop <0.01 -> f/u am trops  - fu EKG  - Cont losartan 25mg qD  - Cont coreg 12.5mg BID  - Cont ASA 81mg qD  - Cont lipitor 40mg qHs  - Echo < from: TTE Echo Complete w/ Contrast w/ Doppler (01.25.23 @ 10:12) >   40 to   45%.   2. Mildly decreased global left ventricular systolic function.   3. Entire apex and basal and mid inferolateral wall are abnormal as   described above.   4. Endocardial visualization was enhanced with intravenous echo contrast.   5. Ultrasound enhancing agent swirling at the apex with no definite   evidence of thrombus.   6. Spectral Doppler shows impaired relaxation pattern of left   ventricular myocardial filling (Grade I diastolic dysfunction).   7. Mild mitral annular calcification.   8. Mild thickening of the anterior and posterior mitral valve leaflets.   9. Trace mitral valve regurgitation.  10. Normal left atrial size.  11. Normal right atrial size.  - c/w DAPT  (patient does not know why she is on both)  - follows with Dr Leyva    #DM  - on trajenta at home  - monitor FS    #HLD  - c/w atorvastatin    #MISC  DVT PPX Lovenox  GI PPX Protonix  DIET DASH/TLC/CC  CODE DNR/DNI

## 2023-01-26 NOTE — PROGRESS NOTE ADULT - ATTENDING COMMENTS
83 yo female Pmhx Copd not home O2, Cad with stent x 1 2019, Dm, Htn presented for SOB    #SOB due to PNA likley GNR with underlaying UIP  - remained on 2-3 NC  - abx as ropceihn and azithro  - steroid per pulm  - off vanco, nasal mrsa neg  - TTE with HF mREF not sure if new or old, will check with her cardiologist   - neg LE duplex     #NORMCYTIC Aanemia  - no evidence of bleeding      #chest pain likley pleuritic   #CAD- s/p stent 2019  #HTN  - Cont losartan 25mg qD  - Cont coreg 12.5mg BID  - Cont DAPT 81mg qD  - Cont lipitor 40mg qHs  - c/w DAPT   - EKG similar to previous 2 yr ago  - follows with Dr Leyva    #DM  - lantus and SS  - monitor FS    #HLD  - c/w atorvastatin    #MISC  DVT PPX Lovenox  GI PPX Protonix  DIET DASH/TLC/CC  CODE DNR/DNI 83 yo female Pmhx Copd not home O2, Cad with stent x 1 2019, Dm, Htn presented for SOB    #SOB due to PNA likley GNR with underlaying UIP  - remained on 2-3 NC  - abx as ropceihn and azithro  - steroid per pulm  - off vanco, nasal mrsa neg  - TTE with HF mREF and wall motion abnormalities not sure if new or old, will check with her cardiologist   - neg LE duplex     #NORMCYTIC Aanemia  - no evidence of bleeding      #chest pain likley pleuritic   #CAD- s/p stent 2019  #HTN  - Cont losartan 25mg qD  - Cont coreg 12.5mg BID  - Cont DAPT 81mg qD  - Cont lipitor 40mg qHs  - c/w DAPT   - EKG similar to previous 2 yr ago  - follows with Dr Leyva    #DM  - lantus and SS  - monitor FS    #HLD  - c/w atorvastatin    #MISC  DVT PPX Lovenox  GI PPX Protonix  DIET DASH/TLC/CC  CODE DNR/DNI 81 yo female Pmhx Copd not home O2, Cad with stent x 1 2019, Dm, Htn presented for SOB    #AHRF due to PNA likley GNR with underlaying UIP  - remained on 2-3 NC  - abx as ropceihn and azithro  - steroid per pulm  - off vanco, nasal mrsa neg  - TTE with HF mREF and wall motion abnormalities not sure if new or old, will check with her cardiologist   - neg LE duplex     #NORMCYTIC Aanemia  - no evidence of bleeding      #chest pain likley pleuritic   #CAD- s/p stent 2019  #HTN  - Cont losartan 25mg qD  - Cont coreg 12.5mg BID  - Cont DAPT 81mg qD  - Cont lipitor 40mg qHs  - c/w DAPT   - EKG similar to previous 2 yr ago  - follows with Dr Leyva    #DM  - lantus and SS  - monitor FS    #HLD  - c/w atorvastatin    #MISC  DVT PPX Lovenox  GI PPX Protonix  DIET DASH/TLC/CC  CODE DNR/DNI

## 2023-01-26 NOTE — PHYSICAL THERAPY INITIAL EVALUATION ADULT - GENERAL OBSERVATIONS, REHAB EVAL
10:33. Pt currently off unit at vascular test followed by Echo as per Shannon LOCKWOOD. Will f/u with PT as appropriate.
Pt rec'd in bed +pts daughter present +Primafit (disconnected) +2L O2/NC. Pt agreeable to PT. Pt able to perform bed mobility, transfers, and amb with PT assist. Pt has dec in strength/endurance.

## 2023-01-26 NOTE — PHYSICAL THERAPY INITIAL EVALUATION ADULT - PERTINENT HX OF CURRENT PROBLEM, REHAB EVAL
82F PMH: COPD (not home O2), HTN, CAD (stentx1 2019) (follows w/ Dr. Rolon), NID-DM. Presented 1/22 for SOB x1wkm- Initially sudden onset mostly on exertion associated with productive cough of whitish sputum. 1/22 pt reports 1x episode of midsternal burning chest pain, non radiating, ~5 mins, not related to exertions, worsened with chest palpation and resolved by itself. SOB progressed so she decided to present to the ED. ROS (+) for  chills

## 2023-01-26 NOTE — CONSULT NOTE ADULT - SUBJECTIVE AND OBJECTIVE BOX
Patient is a 82y old  Female who presents with a chief complaint of Pneumonia, hx MRSA (23 Jan 2023 09:08)      HPI:  81 yo female Pmhx Copd not home O2, Cad with stent x 1 2019, Dm, Htn presenting for SOB. history goes back to 1 week ago when patient started complaining of sudden onset SOB, mostly on exertion associated with productive cough of whitish sputum. she reported chills but denied fever, sore throat, rhinorrhea, congestion, headache, myalgia, sick contacts, chest pain, abdominal pain, N/V, diarrhea or dysuria. patient mentioned yesterday she had an episode of midsternal burning chest pain, non radiating, lasted for around 5 mins, not related to exertions, worsened with chest palpation and resolved by itself. she also mentioned that her SOB worsened so she decided to present to the ED.      VITALS:   T(C): 36.7 (01-22-23 @ 19:37), Max: 36.7 (01-22-23 @ 19:37)  HR: 92 (01-22-23 @ 19:37) (92 - 92)  BP: 152/66 (01-22-23 @ 19:37) (152/66 - 152/66)  RR: 18 (01-22-23 @ 19:37) (18 - 18)  SpO2: 97% (01-22-23 @ 19:37) (97% - 97%)    in ED, vitals and labs were WNLs. patient received solumedrol 125 mg IV, ceftriaxone/azithromycin and admitted to tele for further management (23 Jan 2023 01:11)      PAST MEDICAL & SURGICAL HISTORY:  DM (diabetes mellitus)      CAD (coronary artery disease)      HLD (hyperlipidemia)      HTN (hypertension)      Neuropathy      COPD (chronic obstructive pulmonary disease)          PREVIOUS DIAGNOSTIC TESTING:      ECHO  FINDINGS:    STRESS  FINDINGS:    CATHETERIZATION  FINDINGS:    MEDICATIONS  (STANDING):  albuterol/ipratropium for Nebulization 3 milliLiter(s) Nebulizer every 6 hours  ALPRAZolam 1 milliGRAM(s) Oral two times a day  aspirin enteric coated 81 milliGRAM(s) Oral daily  atorvastatin 40 milliGRAM(s) Oral at bedtime  azithromycin  IVPB      azithromycin  IVPB 500 milliGRAM(s) IV Intermittent every 24 hours  budesonide 160 MICROgram(s)/formoterol 4.5 MICROgram(s) Inhaler 2 Puff(s) Inhalation two times a day  carvedilol 12.5 milliGRAM(s) Oral every 12 hours  cefTRIAXone   IVPB 1000 milliGRAM(s) IV Intermittent every 24 hours  clopidogrel Tablet 75 milliGRAM(s) Oral daily  dextrose 5%. 1000 milliLiter(s) (50 mL/Hr) IV Continuous <Continuous>  dextrose 50% Injectable 25 Gram(s) IV Push once  enoxaparin Injectable 40 milliGRAM(s) SubCutaneous every 24 hours  gabapentin 600 milliGRAM(s) Oral two times a day  glucagon  Injectable 1 milliGRAM(s) IntraMuscular once  guaiFENesin ER 1200 milliGRAM(s) Oral every 12 hours  insulin glargine Injectable (LANTUS) 15 Unit(s) SubCutaneous at bedtime  insulin lispro (ADMELOG) corrective regimen sliding scale   SubCutaneous three times a day before meals  losartan 25 milliGRAM(s) Oral daily  oxybutynin 5 milliGRAM(s) Oral two times a day  pantoprazole    Tablet 40 milliGRAM(s) Oral before breakfast  predniSONE   Tablet 40 milliGRAM(s) Oral daily  risperiDONE   Tablet 1 milliGRAM(s) Oral at bedtime  sertraline 100 milliGRAM(s) Oral daily  tiotropium 2.5 MICROgram(s) Inhaler 2 Puff(s) Inhalation daily    MEDICATIONS  (PRN):  acetaminophen     Tablet .. 650 milliGRAM(s) Oral every 6 hours PRN Temp greater or equal to 38C (100.4F), Mild Pain (1 - 3)  aluminum hydroxide/magnesium hydroxide/simethicone Suspension 30 milliLiter(s) Oral every 4 hours PRN Dyspepsia  dextrose Oral Gel 15 Gram(s) Oral once PRN Blood Glucose LESS THAN 70 milliGRAM(s)/deciliter  dicyclomine 20 milliGRAM(s) Oral three times a day before meals PRN abdominal pain  melatonin 3 milliGRAM(s) Oral at bedtime PRN Insomnia  ondansetron Injectable 4 milliGRAM(s) IV Push every 8 hours PRN Nausea and/or Vomiting      FAMILY HISTORY:      SOCIAL HISTORY:  CIGARETTES:    ALCOHOL:    REVIEW OF SYSTEMS:  CONSTITUTIONAL: No fever, weight loss, or fatigue  NECK: No pain or stiffness  RESPIRATORY: No cough, wheezing, chills or hemoptysis; No shortness of breath  CARDIOVASCULAR: No chest pain, palpitations, dizziness, or leg swelling  GASTROINTESTINAL: No abdominal or epigastric pain. No nausea, vomiting, or hematemesis; No diarrhea or constipation. No melena or hematochezia.  GENITOURINARY: No dysuria, frequency, hematuria, or incontinence  NEUROLOGICAL: No headaches, memory loss, loss of strength, numbness, or tremors  SKIN: No itching, burning, rashes, or lesions   ENDOCRINE: No heat or cold intolerance; No hair loss  MUSCULOSKELETAL: No joint pain or swelling; No muscle, back, or extremity pain  HEME/LYMPH: No easy bruising, or bleeding gums          Vital Signs Last 24 Hrs  T(C): 36.2 (26 Jan 2023 14:52), Max: 36.2 (26 Jan 2023 14:52)  T(F): 97.2 (26 Jan 2023 14:52), Max: 97.2 (26 Jan 2023 14:52)  HR: 61 (26 Jan 2023 14:52) (61 - 65)  BP: 120/60 (26 Jan 2023 14:52) (111/55 - 123/56)  BP(mean): --  RR: 18 (26 Jan 2023 14:52) (18 - 18)  SpO2: 95% (26 Jan 2023 14:52) (95% - 98%)    Parameters below as of 26 Jan 2023 14:52  Patient On (Oxygen Delivery Method): room air            PHYSICAL EXAM:  GENERAL: NAD, well-groomed, well-developed  HEAD:  Atraumatic, Normocephalic  NECK: Supple, No JVD, Normal thyroid  NERVOUS SYSTEM:  Alert & Oriented X3, Good concentration  CHEST/LUNG: Clear to percussion bilaterally; No rales, rhonchi, wheezing, or rubs  HEART: Regular rate and rhythm; No murmurs, rubs, or gallops  ABDOMEN: Soft, Nontender, Nondistended; Bowel sounds present  EXTREMITIES:  2+ Peripheral Pulses, No clubbing, cyanosis, or edema  SKIN: No rashes or lesions    INTERPRETATION OF TELEMETRY:    ECG:    I&O's Detail      LABS:                        9.1    10.05 )-----------( 607      ( 26 Jan 2023 07:26 )             32.8     01-26    140  |  108  |  23<H>  ----------------------------<  88  4.2   |  24  |  0.9    Ca    9.3      26 Jan 2023 07:26  Phos  3.4     01-26  Mg     2.3     01-26    TPro  5.6<L>  /  Alb  3.5  /  TBili  <0.2  /  DBili  x   /  AST  15  /  ALT  17  /  AlkPhos  64  01-26            I&O's Summary      RADIOLOGY & ADDITIONAL STUDIES:

## 2023-01-27 ENCOUNTER — TRANSCRIPTION ENCOUNTER (OUTPATIENT)
Age: 83
End: 2023-01-27

## 2023-01-27 LAB
ALBUMIN SERPL ELPH-MCNC: 3.3 G/DL — LOW (ref 3.5–5.2)
ALP SERPL-CCNC: 59 U/L — SIGNIFICANT CHANGE UP (ref 30–115)
ALT FLD-CCNC: 21 U/L — SIGNIFICANT CHANGE UP (ref 0–41)
ANION GAP SERPL CALC-SCNC: 7 MMOL/L — SIGNIFICANT CHANGE UP (ref 7–14)
AST SERPL-CCNC: 25 U/L — SIGNIFICANT CHANGE UP (ref 0–41)
BASOPHILS # BLD AUTO: 0.02 K/UL — SIGNIFICANT CHANGE UP (ref 0–0.2)
BASOPHILS NFR BLD AUTO: 0.2 % — SIGNIFICANT CHANGE UP (ref 0–1)
BILIRUB SERPL-MCNC: <0.2 MG/DL — SIGNIFICANT CHANGE UP (ref 0.2–1.2)
BUN SERPL-MCNC: 23 MG/DL — HIGH (ref 10–20)
CALCIUM SERPL-MCNC: 9.1 MG/DL — SIGNIFICANT CHANGE UP (ref 8.4–10.5)
CHLORIDE SERPL-SCNC: 107 MMOL/L — SIGNIFICANT CHANGE UP (ref 98–110)
CO2 SERPL-SCNC: 25 MMOL/L — SIGNIFICANT CHANGE UP (ref 17–32)
CREAT SERPL-MCNC: 0.8 MG/DL — SIGNIFICANT CHANGE UP (ref 0.7–1.5)
EGFR: 74 ML/MIN/1.73M2 — SIGNIFICANT CHANGE UP
EOSINOPHIL # BLD AUTO: 0.23 K/UL — SIGNIFICANT CHANGE UP (ref 0–0.7)
EOSINOPHIL NFR BLD AUTO: 2 % — SIGNIFICANT CHANGE UP (ref 0–8)
GLUCOSE BLDC GLUCOMTR-MCNC: 124 MG/DL — HIGH (ref 70–99)
GLUCOSE BLDC GLUCOMTR-MCNC: 141 MG/DL — HIGH (ref 70–99)
GLUCOSE BLDC GLUCOMTR-MCNC: 198 MG/DL — HIGH (ref 70–99)
GLUCOSE BLDC GLUCOMTR-MCNC: 211 MG/DL — HIGH (ref 70–99)
GLUCOSE BLDC GLUCOMTR-MCNC: 97 MG/DL — SIGNIFICANT CHANGE UP (ref 70–99)
GLUCOSE SERPL-MCNC: 89 MG/DL — SIGNIFICANT CHANGE UP (ref 70–99)
HCT VFR BLD CALC: 33 % — LOW (ref 37–47)
HGB BLD-MCNC: 9.2 G/DL — LOW (ref 12–16)
IMM GRANULOCYTES NFR BLD AUTO: 0.6 % — HIGH (ref 0.1–0.3)
LYMPHOCYTES # BLD AUTO: 17.7 % — LOW (ref 20.5–51.1)
LYMPHOCYTES # BLD AUTO: 2.05 K/UL — SIGNIFICANT CHANGE UP (ref 1.2–3.4)
MAGNESIUM SERPL-MCNC: 2.4 MG/DL — SIGNIFICANT CHANGE UP (ref 1.8–2.4)
MCHC RBC-ENTMCNC: 23.4 PG — LOW (ref 27–31)
MCHC RBC-ENTMCNC: 27.9 G/DL — LOW (ref 32–37)
MCV RBC AUTO: 83.8 FL — SIGNIFICANT CHANGE UP (ref 81–99)
MONOCYTES # BLD AUTO: 0.92 K/UL — HIGH (ref 0.1–0.6)
MONOCYTES NFR BLD AUTO: 7.9 % — SIGNIFICANT CHANGE UP (ref 1.7–9.3)
NEUTROPHILS # BLD AUTO: 8.3 K/UL — HIGH (ref 1.4–6.5)
NEUTROPHILS NFR BLD AUTO: 71.6 % — SIGNIFICANT CHANGE UP (ref 42.2–75.2)
NRBC # BLD: 0 /100 WBCS — SIGNIFICANT CHANGE UP (ref 0–0)
PHOSPHATE SERPL-MCNC: 3.5 MG/DL — SIGNIFICANT CHANGE UP (ref 2.1–4.9)
PLATELET # BLD AUTO: 559 K/UL — HIGH (ref 130–400)
POTASSIUM SERPL-MCNC: 4.5 MMOL/L — SIGNIFICANT CHANGE UP (ref 3.5–5)
POTASSIUM SERPL-SCNC: 4.5 MMOL/L — SIGNIFICANT CHANGE UP (ref 3.5–5)
PROT SERPL-MCNC: 5.4 G/DL — LOW (ref 6–8)
RBC # BLD: 3.94 M/UL — LOW (ref 4.2–5.4)
RBC # FLD: 19.2 % — HIGH (ref 11.5–14.5)
SODIUM SERPL-SCNC: 139 MMOL/L — SIGNIFICANT CHANGE UP (ref 135–146)
WBC # BLD: 11.59 K/UL — HIGH (ref 4.8–10.8)
WBC # FLD AUTO: 11.59 K/UL — HIGH (ref 4.8–10.8)

## 2023-01-27 PROCEDURE — 99232 SBSQ HOSP IP/OBS MODERATE 35: CPT

## 2023-01-27 PROCEDURE — 71045 X-RAY EXAM CHEST 1 VIEW: CPT | Mod: 26

## 2023-01-27 RX ORDER — AZITHROMYCIN 500 MG/1
500 TABLET, FILM COATED ORAL DAILY
Refills: 0 | Status: DISCONTINUED | OUTPATIENT
Start: 2023-01-28 | End: 2023-01-28

## 2023-01-27 RX ORDER — FUROSEMIDE 40 MG
40 TABLET ORAL ONCE
Refills: 0 | Status: COMPLETED | OUTPATIENT
Start: 2023-01-27 | End: 2023-01-27

## 2023-01-27 RX ORDER — FUROSEMIDE 40 MG
80 TABLET ORAL ONCE
Refills: 0 | Status: COMPLETED | OUTPATIENT
Start: 2023-01-27 | End: 2023-01-27

## 2023-01-27 RX ORDER — FUROSEMIDE 40 MG
40 TABLET ORAL DAILY
Refills: 0 | Status: DISCONTINUED | OUTPATIENT
Start: 2023-01-28 | End: 2023-01-29

## 2023-01-27 RX ADMIN — Medication 40 MILLIGRAM(S): at 17:07

## 2023-01-27 RX ADMIN — PANTOPRAZOLE SODIUM 40 MILLIGRAM(S): 20 TABLET, DELAYED RELEASE ORAL at 05:54

## 2023-01-27 RX ADMIN — GABAPENTIN 600 MILLIGRAM(S): 400 CAPSULE ORAL at 17:08

## 2023-01-27 RX ADMIN — Medication 1200 MILLIGRAM(S): at 17:07

## 2023-01-27 RX ADMIN — Medication 1 MILLIGRAM(S): at 10:54

## 2023-01-27 RX ADMIN — Medication 5 MILLIGRAM(S): at 17:07

## 2023-01-27 RX ADMIN — ENOXAPARIN SODIUM 40 MILLIGRAM(S): 100 INJECTION SUBCUTANEOUS at 10:55

## 2023-01-27 RX ADMIN — Medication 81 MILLIGRAM(S): at 11:01

## 2023-01-27 RX ADMIN — SERTRALINE 100 MILLIGRAM(S): 25 TABLET, FILM COATED ORAL at 11:01

## 2023-01-27 RX ADMIN — Medication 3 MILLILITER(S): at 09:12

## 2023-01-27 RX ADMIN — Medication 1200 MILLIGRAM(S): at 05:55

## 2023-01-27 RX ADMIN — INSULIN GLARGINE 15 UNIT(S): 100 INJECTION, SOLUTION SUBCUTANEOUS at 21:43

## 2023-01-27 RX ADMIN — ATORVASTATIN CALCIUM 40 MILLIGRAM(S): 80 TABLET, FILM COATED ORAL at 21:44

## 2023-01-27 RX ADMIN — CARVEDILOL PHOSPHATE 12.5 MILLIGRAM(S): 80 CAPSULE, EXTENDED RELEASE ORAL at 05:55

## 2023-01-27 RX ADMIN — LOSARTAN POTASSIUM 25 MILLIGRAM(S): 100 TABLET, FILM COATED ORAL at 05:55

## 2023-01-27 RX ADMIN — GABAPENTIN 600 MILLIGRAM(S): 400 CAPSULE ORAL at 05:55

## 2023-01-27 RX ADMIN — CLOPIDOGREL BISULFATE 75 MILLIGRAM(S): 75 TABLET, FILM COATED ORAL at 11:01

## 2023-01-27 RX ADMIN — CARVEDILOL PHOSPHATE 12.5 MILLIGRAM(S): 80 CAPSULE, EXTENDED RELEASE ORAL at 17:08

## 2023-01-27 RX ADMIN — Medication 1 MILLIGRAM(S): at 17:06

## 2023-01-27 RX ADMIN — Medication 5 MILLIGRAM(S): at 05:54

## 2023-01-27 RX ADMIN — AZITHROMYCIN 255 MILLIGRAM(S): 500 TABLET, FILM COATED ORAL at 09:25

## 2023-01-27 RX ADMIN — Medication 650 MILLIGRAM(S): at 20:44

## 2023-01-27 RX ADMIN — Medication 80 MILLIGRAM(S): at 12:53

## 2023-01-27 RX ADMIN — Medication 1 TABLET(S): at 17:08

## 2023-01-27 RX ADMIN — Medication 650 MILLIGRAM(S): at 20:13

## 2023-01-27 RX ADMIN — RISPERIDONE 1 MILLIGRAM(S): 4 TABLET ORAL at 21:43

## 2023-01-27 RX ADMIN — Medication 40 MILLIGRAM(S): at 05:55

## 2023-01-27 NOTE — DISCHARGE NOTE PROVIDER - NSDCFUSCHEDAPPT_GEN_ALL_CORE_FT
Wesley Rogers  [facility]  [address] [place] [address] Av  Scheduled Appointment: 03/23/2023     Wesley Rogers  [facility]  [address] [place] [address] Av  Scheduled Appointment: 02/21/2023

## 2023-01-27 NOTE — DISCHARGE NOTE PROVIDER - CARE PROVIDER_API CALL
REG SKELTON  [facility]  [address] [place] [address]  Phone: ([phone]  Fax: ([phone]  Follow Up Time: 1 week

## 2023-01-27 NOTE — PROGRESS NOTE ADULT - ASSESSMENT
83 yo female Pmhx Copd not home O2, Cad with stent x 1 2019, Dm, Htn presented for SOB    #RLL PNA  #COPD exacerbation  - SOB since 1 week prior to presentation associated with productive cough  - CXR: < from: Xray Chest 1 View- PORTABLE-Urgent (01.22.23 @ 23:09) >Extensive bilateral lung opacities  - CT Chest No Cont (01.23.23 @ 17:43); Bilateral peripheral and basilar distributed honeycombing with bronchiolectasis,, compatible with UIP. Superimposed areas of consolidation, right lower lobe and right middle lobes, compatible with pneumonia. Extensive coronary artery calcifications. 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 Diminished attenuation within cardiac chambers, suggesting underlying anemia  - VA Duplex Lower Ext Vein Scan, Bilat (01.25.23 @ 08:28) >No evidence of deep venous thrombosis in either lower extremity.  - 1/23 urine strep and legionella (-), Procal (-), MRSA (-), RSV-COVID-FLU negative, 1/23 BCx NGTD, VBG noted with no hypercapnia   - s/p Vancomycin 1/23-1/25, cefepime 1/23-1/25, ceftriaxone/azithromycin in ED -> c/w with same abx  - s/p solumedrol 125 mg iv in ED, solumedrol 40 BID  - c/w duoneb  - f/u sputum culture  - Pulm following recs noted: switching abx azithro/roceph, symbicourt/spiriva/albuterol PRN, pred 40mg taper over 10d, OP PFTs, repeat CT 4-6wks  - Goal O2 >92%  - Ceftriaxone 1/25-present, azithromycin 1/25-present - transition to augmentin 875mg BID for 7 days and azithromycin 250mg QD for 2 days   - Cont prednisone 40mg qD x10d taper (1/23-2/1)  - Repeat CT chest NO contrast outpatient   - Wean off O2 as tolerated  - PT eval pending    #anemia  - Hb 9.3 at baseline  - on IV iron supplementation?  - iron profile in AM: %sat 10    #chest pain  #CAD- s/p stent 2019  #HTN  - patient mentioned 1 episode of chest burning that has resolved  - chest tender to palpation -> most likely pain related to PNA  - trop <0.01 -> f/u am trops  - fu EKG  - Cont losartan 25mg qD  - Cont coreg 12.5mg BID  - Cont ASA 81mg qD  - Cont lipitor 40mg qHs  - Echo < from: TTE Echo Complete w/ Contrast w/ Doppler (01.25.23 @ 10:12)   1. EF 40 to 45%.   2. Mildly decreased global left ventricular systolic function.   3. Entire apex and basal and mid inferolateral wall are abnormal as   described above.   4. Endocardial visualization was enhanced with intravenous echo contrast.   5. Ultrasound enhancing agent swirling at the apex with no definite   evidence of thrombus.   6. Spectral Doppler shows impaired relaxation pattern of left   ventricular myocardial filling (Grade I diastolic dysfunction).   7. Mild mitral annular calcification.   8. Mild thickening of the anterior and posterior mitral valve leaflets.   9. Trace mitral valve regurgitation.  10. Normal left atrial size.  11. Normal right atrial size.  - c/w DAPT  (patient does not know why she is on both)  - outpatient stress test   - follows with Dr Leyva    #DM  - on trajenta at home  - monitor FS    #HLD  - c/w atorvastatin    #MISC  DVT PPX Lovenox  GI PPX Protonix  DIET DASH/TLC/CC  CODE DNR/DNI

## 2023-01-27 NOTE — PROGRESS NOTE ADULT - ASSESSMENT
81 yo female Pmhx Copd not home O2, Cad with stent x 1 2019, Dm, Htn presented for SOB    #AHRF due to PNA likley GNR with underlaying UIP  #copd exacerbation  #suspected fluid overload  - remained on 2-3 NC, drop 88-87% on RA ambulation  - abx as ropceihn and azithro  - steroid change to IV 40 bid  - off vanco, nasal mrsa neg  - try iv lasix start 1/27  - neg LE duplex     #HFmREF and wall motion abnormalities  - new, cardio involved, stress trest as outpt       #NORMCYTIC Aanemia  - no evidence of bleeding      #chest pain likley pleuritic   #CAD- s/p stent 2019  #HTN  - Cont losartan 25mg qD  - Cont coreg 12.5mg BID  - Cont DAPT 81mg qD  - Cont lipitor 40mg qHs  - c/w DAPT   - EKG similar to previous 2 yr ago  - follows with Dr Leyva    #DM  - lantus and SS  - monitor FS    #HLD  - c/w atorvastatin    #MISC  DVT PPX Lovenox  GI PPX Protonix  DIET DASH/TLC/CC  CODE DNR/DNI.       #dispo: home, still hypoxic  son updated at bedside

## 2023-01-27 NOTE — DISCHARGE NOTE PROVIDER - HOSPITAL COURSE
HPI:  83 yo female Pmhx Copd not home O2, Cad with stent x 1 2019, Dm, Htn presenting for SOB. history goes back to 1 week ago when patient started complaining of sudden onset SOB, mostly on exertion associated with productive cough of whitish sputum. she reported chills but denied fever, sore throat, rhinorrhea, congestion, headache, myalgia, sick contacts, chest pain, abdominal pain, N/V, diarrhea or dysuria. patient mentioned yesterday she had an episode of midsternal burning chest pain, non radiating, lasted for around 5 mins, not related to exertions, worsened with chest palpation and resolved by itself. she also mentioned that her SOB worsened so she decided to present to the ED.    Discharge A/P:  #AHRF due to PNA likley GNR with underlaying UIP  - remained on 2-3 NC  - abx as ropceihn and azithro  - steroid per pulm  - off vanco, nasal mrsa neg  - TTE with HF mREF and wall motion abnormalities not sure if new or old, will check with her cardiologist   - neg LE duplex     #NORMCYTIC Aanemia  - no evidence of bleeding    #chest pain likley pleuritic   #CAD- s/p stent 2019  #HTN  - Cont losartan 25mg qD  - Cont coreg 12.5mg BID  - Cont DAPT 81mg qD  - Cont lipitor 40mg qHs  - c/w DAPT   - EKG similar to previous 2 yr ago  - follows with Dr Leyva    #DM  - c/w home meds    #HLD  - c/w atorvastatin    Patient is medically stable and ready for discharge. HPI:  81 yo female Pmhx Copd not home O2, Cad with stent x 1 2019, Dm, Htn presenting for SOB. history goes back to 1 week ago when patient started complaining of sudden onset SOB, mostly on exertion associated with productive cough of whitish sputum. she reported chills but denied fever, sore throat, rhinorrhea, congestion, headache, myalgia, sick contacts, chest pain, abdominal pain, N/V, diarrhea or dysuria. patient mentioned yesterday she had an episode of midsternal burning chest pain, non radiating, lasted for around 5 mins, not related to exertions, worsened with chest palpation and resolved by itself. she also mentioned that her SOB worsened so she decided to present to the ED.    patint was diagnsoed with COPD exacerbation secondary to pneumonia. received IV steroids and finished a course of antibiotics  BP was low hence losartan held -> OP FU

## 2023-01-27 NOTE — DISCHARGE NOTE PROVIDER - NSDCMRMEDTOKEN_GEN_ALL_CORE_FT
albuterol 90 mcg/inh inhalation aerosol: 2 puff(s) inhaled every 6 hours, As Needed  aspirin 81 mg oral delayed release tablet: 1 tab(s) orally once a day  atorvastatin 40 mg oral tablet: 1 tab(s) orally once a day (at bedtime)   carvedilol 25 mg oral tablet: 0.5 tab(s) orally 2 times a day  clopidogrel 75 mg oral tablet: 1 tab(s) orally once a day MDD:1  dicyclomine 20 mg oral tablet: 1 tab(s) orally 3 times a day, As Needed  gabapentin 600 mg oral tablet: 1 tab(s) orally 2 times a day  losartan 25 mg oral tablet: 1 tab(s) orally once a day  pantoprazole 40 mg oral delayed release tablet: 1 tab(s) orally once a day  risperiDONE 1 mg oral tablet: 1 tab(s) orally once a day (at bedtime)  solifenacin 5 mg oral tablet: 1 tab(s) orally once a day  Tradjenta 5 mg oral tablet: 1 tab(s) orally once a day  Trelegy Ellipta 200 mcg-62.5 mcg-25 mcg/inh inhalation powder: 1 puff(s) inhaled once a day  Xanax 1 mg oral tablet: 1 tab(s) orally 2 times a day  Zoloft 100 mg oral tablet: 1 tab(s) orally once a day   albuterol 90 mcg/inh inhalation aerosol: 2 puff(s) inhaled every 6 hours, As Needed  aspirin 81 mg oral delayed release tablet: 1 tab(s) orally once a day  atorvastatin 40 mg oral tablet: 1 tab(s) orally once a day (at bedtime)   carvedilol 25 mg oral tablet: 0.5 tab(s) orally 2 times a day  clopidogrel 75 mg oral tablet: 1 tab(s) orally once a day MDD:1  dicyclomine 20 mg oral tablet: 1 tab(s) orally 3 times a day (before meals), As needed, abdominal pain  gabapentin 600 mg oral tablet: 1 tab(s) orally 2 times a day  losartan 25 mg oral tablet: 1 tab(s) orally once a day  pantoprazole 40 mg oral delayed release tablet: 1 tab(s) orally once a day  risperiDONE 1 mg oral tablet: 1 tab(s) orally once a day (at bedtime)  solifenacin 5 mg oral tablet: 1 tab(s) orally once a day  Tradjenta 5 mg oral tablet: 1 tab(s) orally once a day  Trelegy Ellipta 200 mcg-62.5 mcg-25 mcg/inh inhalation powder: 1 puff(s) inhaled once a day  Xanax 1 mg oral tablet: 1 tab(s) orally 2 times a day  Zoloft 100 mg oral tablet: 1 tab(s) orally once a day   albuterol 90 mcg/inh inhalation aerosol: 2 puff(s) inhaled every 6 hours, As Needed  aspirin 81 mg oral delayed release tablet: 1 tab(s) orally once a day  atorvastatin 40 mg oral tablet: 1 tab(s) orally once a day (at bedtime)   carvedilol 25 mg oral tablet: 0.5 tab(s) orally 2 times a day  clopidogrel 75 mg oral tablet: 1 tab(s) orally once a day MDD:1  dicyclomine 20 mg oral tablet: 1 tab(s) orally 3 times a day (before meals), As needed, abdominal pain  gabapentin 600 mg oral tablet: 1 tab(s) orally 2 times a day  pantoprazole 40 mg oral delayed release tablet: 1 tab(s) orally once a day  predniSONE 20 mg oral tablet: 2 tab(s) orally once a day  risperiDONE 1 mg oral tablet: 1 tab(s) orally once a day (at bedtime)  solifenacin 5 mg oral tablet: 1 tab(s) orally once a day  Tradjenta 5 mg oral tablet: 1 tab(s) orally once a day  Trelegy Ellipta 200 mcg-62.5 mcg-25 mcg/inh inhalation powder: 1 puff(s) inhaled once a day  Xanax 1 mg oral tablet: 1 tab(s) orally 2 times a day  Zoloft 100 mg oral tablet: 1 tab(s) orally once a day

## 2023-01-27 NOTE — PROGRESS NOTE ADULT - SUBJECTIVE AND OBJECTIVE BOX
Patient is a 82y old  Female who presents with a chief complaint of Pneumonia, hx MRSA (23 Jan 2023 09:08)      OVERNIGHT EVENTS: remained on NC, intermittent SOB  denies fever, chills, syncope, seizure, headache,  abd pain, N/V/D, urinary symptoms, legs swelling,     SUBJECTIVE / INTERVAL HPI: Patient seen and examined at bedside.     VITAL SIGNS:  Vital Signs Last 24 Hrs  T(C): 36.3 (27 Jan 2023 12:57), Max: 36.3 (27 Jan 2023 12:57)  T(F): 97.3 (27 Jan 2023 12:57), Max: 97.3 (27 Jan 2023 12:57)  HR: 87 (27 Jan 2023 12:57) (61 - 101)  BP: 118/61 (27 Jan 2023 12:57) (112/52 - 120/60)  BP(mean): --  RR: 18 (26 Jan 2023 20:31) (18 - 18)  SpO2: 95% (27 Jan 2023 11:45) (89% - 95%)    Parameters below as of 27 Jan 2023 11:45  Patient On (Oxygen Delivery Method): nasal cannula  O2 Flow (L/min): 2      PHYSICAL EXAM:    General: WDWN  HEENT: NC/AT; PERRL, clear conjunctiva  Neck: supple  Cardiovascular: +S1/S2; RRR  Respiratory: CTA b/l; mild exp wheeze abd b/l scattered crackles   Gastrointestinal: soft, NT/ND; +BSx4  Extremities: WWP; 2+ peripheral pulses; no edema   Neurological: AAOx3; no focal deficits    MEDICATIONS:  MEDICATIONS  (STANDING):  albuterol/ipratropium for Nebulization 3 milliLiter(s) Nebulizer every 6 hours  ALPRAZolam 1 milliGRAM(s) Oral two times a day  amoxicillin  875 milliGRAM(s)/clavulanate 1 Tablet(s) Oral two times a day  aspirin enteric coated 81 milliGRAM(s) Oral daily  atorvastatin 40 milliGRAM(s) Oral at bedtime  budesonide 160 MICROgram(s)/formoterol 4.5 MICROgram(s) Inhaler 2 Puff(s) Inhalation two times a day  carvedilol 12.5 milliGRAM(s) Oral every 12 hours  clopidogrel Tablet 75 milliGRAM(s) Oral daily  dextrose 5%. 1000 milliLiter(s) (50 mL/Hr) IV Continuous <Continuous>  dextrose 50% Injectable 25 Gram(s) IV Push once  enoxaparin Injectable 40 milliGRAM(s) SubCutaneous every 24 hours  gabapentin 600 milliGRAM(s) Oral two times a day  glucagon  Injectable 1 milliGRAM(s) IntraMuscular once  guaiFENesin ER 1200 milliGRAM(s) Oral every 12 hours  insulin glargine Injectable (LANTUS) 15 Unit(s) SubCutaneous at bedtime  insulin lispro (ADMELOG) corrective regimen sliding scale   SubCutaneous three times a day before meals  losartan 25 milliGRAM(s) Oral daily  oxybutynin 5 milliGRAM(s) Oral two times a day  pantoprazole    Tablet 40 milliGRAM(s) Oral before breakfast  risperiDONE   Tablet 1 milliGRAM(s) Oral at bedtime  sertraline 100 milliGRAM(s) Oral daily  tiotropium 2.5 MICROgram(s) Inhaler 2 Puff(s) Inhalation daily    MEDICATIONS  (PRN):  acetaminophen     Tablet .. 650 milliGRAM(s) Oral every 6 hours PRN Temp greater or equal to 38C (100.4F), Mild Pain (1 - 3)  aluminum hydroxide/magnesium hydroxide/simethicone Suspension 30 milliLiter(s) Oral every 4 hours PRN Dyspepsia  dextrose Oral Gel 15 Gram(s) Oral once PRN Blood Glucose LESS THAN 70 milliGRAM(s)/deciliter  dicyclomine 20 milliGRAM(s) Oral three times a day before meals PRN abdominal pain  melatonin 3 milliGRAM(s) Oral at bedtime PRN Insomnia  ondansetron Injectable 4 milliGRAM(s) IV Push every 8 hours PRN Nausea and/or Vomiting      ALLERGIES:  Allergies    No Known Allergies    Intolerances        LABS:                        9.2    11.59 )-----------( 559      ( 27 Jan 2023 07:44 )             33.0     01-27    139  |  107  |  23<H>  ----------------------------<  89  4.5   |  25  |  0.8    Ca    9.1      27 Jan 2023 07:44  Phos  3.5     01-27  Mg     2.4     01-27    TPro  5.4<L>  /  Alb  3.3<L>  /  TBili  <0.2  /  DBili  x   /  AST  25  /  ALT  21  /  AlkPhos  59  01-27        CAPILLARY BLOOD GLUCOSE      POCT Blood Glucose.: 141 mg/dL (27 Jan 2023 11:36)      RADIOLOGY & ADDITIONAL TESTS: Reviewed.    < from: VA Duplex Lower Ext Vein Scan, Bilat (01.25.23 @ 08:28) >  RIGHT:  Normal compressibility of the RIGHT common femoral, femoral and popliteal   veins.  Doppler examination shows normal spontaneous and phasic flow.  No RIGHT calf vein thrombosis is detected.    LEFT:  Normal compressibility of the LEFT common femoral, femoral and popliteal   veins.  Doppler examination shows normal spontaneous and phasic flow.  No LEFT calf vein thrombosis is detected.    IMPRESSION:  No evidence of deep venous thrombosis in either lower extremity.    < end of copied text >  < from: TTE Echo Complete w/ Contrast w/ Doppler (01.25.23 @ 10:12) >    PHYSICIAN INTERPRETATION:  Left Ventricle: Endocardial visualization was enhanced with intravenous   echo contrast. Global LV systolic function was mildly decreased. Left   ventricular ejection fraction, by visual estimation, is 40 to 45%.   Spectral Doppler shows impaired relaxation pattern of left ventricular   myocardial filling (Grade I diastolic dysfunction). Normal LV filling   pressures. Ultrasound enhancing agent swirling at the apex with no   definite evidence of thrombus.      LV Wall Scoring:  The entire apex is dyskinetic. The basal and mid inferolateral wall is  hypokinetic. All remaining scored segments are normal.    Right Ventricle: Normal right ventricular size and function.  Left Atrium: Normal left atrial size.  Right Atrium: Normal right atrial size.  Pericardium: There is no evidence of pericardial effusion.  Mitral Valve: The mitral valve is normal in structure. Mild thickening of   the anterior and posterior mitral valve leaflets. There is mild mitral   annular calcification. No evidence of mitral valve stenosis. Trace mitral   valve regurgitation is seen.  Tricuspid Valve: The tricuspid valve is normal in structure. Trivial   tricuspid regurgitation is visualized.  Aortic Valve: The aortic valve is trileaflet. No evidence of aortic   stenosis. No evidence of aortic valve regurgitation is seen.  Pulmonic Valve: Structurally normal pulmonic valve, with normal leaflet   excursion. Trace pulmonic valve regurgitation.  Aorta: The aortic root and ascending aorta are structurally normal, with   no evidence of dilitation.  Venous: The inferior vena cava was normal sized, with respiratory size   variation greater than 50%.    < end of copied text >

## 2023-01-27 NOTE — DISCHARGE NOTE PROVIDER - ATTENDING DISCHARGE PHYSICAL EXAMINATION:
Vital Signs Last 24 Hrs  T(C): 35.9 (01 Feb 2023 11:55), Max: 36.7 (31 Jan 2023 21:33)  T(F): 96.6 (01 Feb 2023 11:55), Max: 98 (31 Jan 2023 21:33)  HR: 92 (01 Feb 2023 11:55) (69 - 92)  BP: 84/53 (01 Feb 2023 11:55) (84/53 - 122/58)  RR: 18 (01 Feb 2023 11:55) (17 - 18)  SpO2: 97% (01 Feb 2023 08:04) (97% - 98%)  REPEATED BP IS BETTER PT ASYMPTOMATIC   STOP LOSARTAN     PHYSICAL EXAM:  GENERAL: NAD, well-developed  HEAD:  Atraumatic, Normocephalic  EYES: EOMI, PERRLA, conjunctiva and sclera clear  NECK: Supple, No JVD  CHEST/LUNG: RHONCHI B/L / GOOD A/E   HEART: Regular rate and rhythm; No murmurs, rubs, or gallops  ABDOMEN: Soft, Nontender, Nondistended; Bowel sounds present  EXTREMITIES:  2+ Peripheral Pulses, No clubbing, cyanosis, or edema  PSYCH: AAOx3  NEUROLOGY: non-focal  SKIN: No rashes or lesions

## 2023-01-27 NOTE — DISCHARGE NOTE PROVIDER - NSDCCPCAREPLAN_GEN_ALL_CORE_FT
PRINCIPAL DISCHARGE DIAGNOSIS  Diagnosis: Pneumonia  Assessment and Plan of Treatment: Please take your medications as directed. Don’t skip doses. Follow up with your primary care physician within 3 days. Continue taking your antibiotics as directed until they are all gone—even if you start to feel better. This will prevent the pneumonia from  Coughing up mucus is normal. Don’t use medicines to suppress your cough unless your cough is dry, painful, or interferes with your sleep. Get plenty of rest until your fever, shortness of breath, and chest pain go away. Plan to get a flu shot every year. Ask your primary care doctor about pneumonia vaccines.  Seek immediate medical attention if you experience chest pain, trouble breathing, blue lips or fingernails, fever of 100.4°F  (38°C) or higher, yellow, green, bloody, or smelly sputum, more than normal mucus production, vomiting or diarrhea.  please follow up with your pulm and PCP        SECONDARY DISCHARGE DIAGNOSES  Diagnosis: COPD exacerbation  Assessment and Plan of Treatment: Chronic obstructive pulmonary disease (COPD) is a lung condition in which airflow from the lungs is limited. Causes include smoking, secondhand smoke exposure, genetics, or recurrent infections. Take all medicines (inhaled or pills) as directed by your health care provider. Avoid exposure to irritants such as smoke, chemicals, and fumes that aggravate your breathing.  If you are a smoker, the most important thing that you can do is stop smoking. Continuing to smoke will cause further lung damage and breathing trouble. Ask your health care provider for help with quitting smoking.  SEEK IMMEDIATE MEDICAL CARE IF YOU HAVE ANY OF THE FOLLOWING SYMPTOMS: shortness of breath at rest or when talking, bluish discoloration of lips, skin, fever, worsening cough, unexplained chest pain, or lightheadedness/dizziness.      Diagnosis: Chest pain  Assessment and Plan of Treatment:      PRINCIPAL DISCHARGE DIAGNOSIS  Diagnosis: Pneumonia  Assessment and Plan of Treatment: SUSPECTED GRAM NEGATIVE PNA  - S/P ABX COURSE   ACUTE HYPOXIC RESP FAILURE  - DUE TO PNA AND COPD EXARC  - USE 2L NC HOME O2 APPROVED TODAY  - FOLLOW WITH PULMONARY IN 1WK   - FOLLOW PMD IN 1-2 WEEKS   - USE ALL HOME MEDS AS INSTRUCTED ON [facility]  - STOP LOSARTAN YOUR BP IS TOO LOW AND F/U WITH YOUR CARDIOLOGIST   Please take your medications as directed. Don’t skip doses. Follow up with your primary care physician within 3 days. Continue taking your antibiotics as directed until they are all gone—even if you start to feel better. This will prevent the pneumonia from  Coughing up mucus is normal. Don’t use medicines to suppress your cough unless your cough is dry, painful, or interferes with your sleep. Get plenty of rest until your fever, shortness of breath, and chest pain go away. Plan to get a flu shot every year. Ask your primary care doctor about pneumonia vaccines.  Seek immediate medical attention if you experience chest pain, trouble breathing, blue lips or fingernails, fever of 100.4°F  (38°C) or higher, yellow, green, bloody, or smelly sputum, more than normal mucus production, vomiting or diarrhea.  please follow up with your pulm and PCP        SECONDARY DISCHARGE DIAGNOSES  Diagnosis: COPD exacerbation  Assessment and Plan of Treatment: Chronic obstructive pulmonary disease (COPD) is a lung condition in which airflow from the lungs is limited. Causes include smoking, secondhand smoke exposure, genetics, or recurrent infections. Take all medicines (inhaled or pills) as directed by your health care provider. Avoid exposure to irritants such as smoke, chemicals, and fumes that aggravate your breathing.  If you are a smoker, the most important thing that you can do is stop smoking. Continuing to smoke will cause further lung damage and breathing trouble. Ask your health care provider for help with quitting smoking.  SEEK IMMEDIATE MEDICAL CARE IF YOU HAVE ANY OF THE FOLLOWING SYMPTOMS: shortness of breath at rest or when talking, bluish discoloration of lips, skin, fever, worsening cough, unexplained chest pain, or lightheadedness/dizziness.      Diagnosis: Chest pain  Assessment and Plan of Treatment:

## 2023-01-27 NOTE — PROGRESS NOTE ADULT - SUBJECTIVE AND OBJECTIVE BOX
KIMBER BARBER 82y Female  MRN#: [id number]     Hospital Day: 4d    Pt is currently admitted with the primary diagnosis of  DYSPNEA; COPD EXACERBATION; CHEST PAIN    SUBJECTIVE     Overnight events  None    Subjective complaints  Pt was evaluated this am. Patient denies SOB and chest pain. She denies fever, chills, body aches, nausea, vomiting diarrhea. States she is comfortable on 2L of oxygen.                                            ----------------------------------------------------------  OBJECTIVE  PAST MEDICAL & SURGICAL HISTORY  DM (diabetes mellitus)    CAD (coronary artery disease)    HLD (hyperlipidemia)    HTN (hypertension)    Neuropathy    COPD (chronic obstructive pulmonary disease)                                              -----------------------------------------------------------  ALLERGIES:  No Known Allergies                                            ------------------------------------------------------------    HOME MEDICATIONS  Home Medications:  albuterol 90 mcg/inh inhalation aerosol: 2 puff(s) inhaled every 6 hours, As Needed (23 Jan 2023 02:10)  aspirin 81 mg oral delayed release tablet: 1 tab(s) orally once a day (23 Jan 2023 02:10)  carvedilol 25 mg oral tablet: 0.5 tab(s) orally 2 times a day (23 Jan 2023 02:10)  dicyclomine 20 mg oral tablet: 1 tab(s) orally 3 times a day, As Needed (23 Jan 2023 02:10)  gabapentin 600 mg oral tablet: 1 tab(s) orally 2 times a day (23 Jan 2023 02:10)  losartan 25 mg oral tablet: 1 tab(s) orally once a day (23 Jan 2023 02:10)  pantoprazole 40 mg oral delayed release tablet: 1 tab(s) orally once a day (23 Jan 2023 02:10)  risperiDONE 1 mg oral tablet: 1 tab(s) orally once a day (at bedtime) (23 Jan 2023 02:10)  solifenacin 5 mg oral tablet: 1 tab(s) orally once a day (23 Jan 2023 02:10)  Tradjenta 5 mg oral tablet: 1 tab(s) orally once a day (23 Jan 2023 02:10)  Trelegy Ellipta 200 mcg-62.5 mcg-25 mcg/inh inhalation powder: 1 puff(s) inhaled once a day (23 Jan 2023 02:10)  Xanax 1 mg oral tablet: 1 tab(s) orally 2 times a day (23 Jan 2023 02:10)  Zoloft 100 mg oral tablet: 1 tab(s) orally once a day (23 Jan 2023 02:10)                           MEDICATIONS:  STANDING MEDICATIONS  albuterol/ipratropium for Nebulization 3 milliLiter(s) Nebulizer every 6 hours  ALPRAZolam 1 milliGRAM(s) Oral two times a day  aspirin enteric coated 81 milliGRAM(s) Oral daily  atorvastatin 40 milliGRAM(s) Oral at bedtime  azithromycin  IVPB      azithromycin  IVPB 500 milliGRAM(s) IV Intermittent every 24 hours  budesonide 160 MICROgram(s)/formoterol 4.5 MICROgram(s) Inhaler 2 Puff(s) Inhalation two times a day  carvedilol 12.5 milliGRAM(s) Oral every 12 hours  cefTRIAXone   IVPB 1000 milliGRAM(s) IV Intermittent every 24 hours  clopidogrel Tablet 75 milliGRAM(s) Oral daily  dextrose 5%. 1000 milliLiter(s) IV Continuous <Continuous>  dextrose 50% Injectable 25 Gram(s) IV Push once  enoxaparin Injectable 40 milliGRAM(s) SubCutaneous every 24 hours  gabapentin 600 milliGRAM(s) Oral two times a day  glucagon  Injectable 1 milliGRAM(s) IntraMuscular once  guaiFENesin ER 1200 milliGRAM(s) Oral every 12 hours  insulin glargine Injectable (LANTUS) 15 Unit(s) SubCutaneous at bedtime  insulin lispro (ADMELOG) corrective regimen sliding scale   SubCutaneous three times a day before meals  losartan 25 milliGRAM(s) Oral daily  oxybutynin 5 milliGRAM(s) Oral two times a day  pantoprazole    Tablet 40 milliGRAM(s) Oral before breakfast  risperiDONE   Tablet 1 milliGRAM(s) Oral at bedtime  sertraline 100 milliGRAM(s) Oral daily  tiotropium 2.5 MICROgram(s) Inhaler 2 Puff(s) Inhalation daily    PRN MEDICATIONS  acetaminophen     Tablet .. 650 milliGRAM(s) Oral every 6 hours PRN  aluminum hydroxide/magnesium hydroxide/simethicone Suspension 30 milliLiter(s) Oral every 4 hours PRN  dextrose Oral Gel 15 Gram(s) Oral once PRN  dicyclomine 20 milliGRAM(s) Oral three times a day before meals PRN  melatonin 3 milliGRAM(s) Oral at bedtime PRN  ondansetron Injectable 4 milliGRAM(s) IV Push every 8 hours PRN                                            ------------------------------------------------------------  VITAL SIGNS: Last 24 Hours  T(C): 35.8 (26 Jan 2023 20:31), Max: 36.2 (26 Jan 2023 14:52)  T(F): 96.5 (26 Jan 2023 20:31), Max: 97.2 (26 Jan 2023 14:52)  HR: 75 (27 Jan 2023 09:35) (61 - 75)  BP: 112/52 (26 Jan 2023 20:31) (112/52 - 120/60)  BP(mean): --  RR: 18 (26 Jan 2023 20:31) (18 - 18)  SpO2: 92% (27 Jan 2023 09:35) (92% - 95%)                                             --------------------------------------------------------------  LABS:                        9.2    11.59 )-----------( 559      ( 27 Jan 2023 07:44 )             33.0     01-27    139  |  107  |  23<H>  ----------------------------<  89  4.5   |  25  |  0.8    Ca    9.1      27 Jan 2023 07:44  Phos  3.5     01-27  Mg     2.4     01-27    TPro  5.4<L>  /  Alb  3.3<L>  /  TBili  <0.2  /  DBili  x   /  AST  25  /  ALT  21  /  AlkPhos  59  01-27                                              -------------------------------------------------------------  RADIOLOGY:                                            --------------------------------------------------------------    PHYSICAL EXAM:  CONSTITUTIONAL: No acute distress, well-developed, well-groomed, AAOx3  HEAD: Atraumatic, normocephalic  EYES: EOM intact, PERRLA, conjunctiva and sclera clear  ENT: Supple, no masses, no thyromegaly, no bruits, no JVD; moist mucous membranes  PULMONARY: Clear to auscultation bilaterally; no wheezes, rales, or rhonchi  CARDIOVASCULAR: Regular rate and rhythm; no murmurs, rubs, or gallops  GASTROINTESTINAL: Soft, non-tender, non-distended; bowel sounds present  MUSCULOSKELETAL: 2+ peripheral pulses; no clubbing, no cyanosis, no edema  NEUROLOGY: non-focal  SKIN: No rashes or lesions; warm and dry                                            --------------------------------------------------------------

## 2023-01-28 LAB
ALBUMIN SERPL ELPH-MCNC: 3.7 G/DL — SIGNIFICANT CHANGE UP (ref 3.5–5.2)
ALP SERPL-CCNC: 65 U/L — SIGNIFICANT CHANGE UP (ref 30–115)
ALT FLD-CCNC: 23 U/L — SIGNIFICANT CHANGE UP (ref 0–41)
ANION GAP SERPL CALC-SCNC: 9 MMOL/L — SIGNIFICANT CHANGE UP (ref 7–14)
AST SERPL-CCNC: 19 U/L — SIGNIFICANT CHANGE UP (ref 0–41)
BASOPHILS # BLD AUTO: 0.01 K/UL — SIGNIFICANT CHANGE UP (ref 0–0.2)
BASOPHILS NFR BLD AUTO: 0.1 % — SIGNIFICANT CHANGE UP (ref 0–1)
BILIRUB SERPL-MCNC: <0.2 MG/DL — SIGNIFICANT CHANGE UP (ref 0.2–1.2)
BUN SERPL-MCNC: 27 MG/DL — HIGH (ref 10–20)
CALCIUM SERPL-MCNC: 9.1 MG/DL — SIGNIFICANT CHANGE UP (ref 8.4–10.4)
CHLORIDE SERPL-SCNC: 107 MMOL/L — SIGNIFICANT CHANGE UP (ref 98–110)
CO2 SERPL-SCNC: 26 MMOL/L — SIGNIFICANT CHANGE UP (ref 17–32)
CREAT SERPL-MCNC: 1 MG/DL — SIGNIFICANT CHANGE UP (ref 0.7–1.5)
CULTURE RESULTS: SIGNIFICANT CHANGE UP
EGFR: 57 ML/MIN/1.73M2 — LOW
EOSINOPHIL # BLD AUTO: 0.01 K/UL — SIGNIFICANT CHANGE UP (ref 0–0.7)
EOSINOPHIL NFR BLD AUTO: 0.1 % — SIGNIFICANT CHANGE UP (ref 0–8)
GLUCOSE BLDC GLUCOMTR-MCNC: 120 MG/DL — HIGH (ref 70–99)
GLUCOSE BLDC GLUCOMTR-MCNC: 183 MG/DL — HIGH (ref 70–99)
GLUCOSE BLDC GLUCOMTR-MCNC: 190 MG/DL — HIGH (ref 70–99)
GLUCOSE BLDC GLUCOMTR-MCNC: 85 MG/DL — SIGNIFICANT CHANGE UP (ref 70–99)
GLUCOSE SERPL-MCNC: 116 MG/DL — HIGH (ref 70–99)
HCT VFR BLD CALC: 33.3 % — LOW (ref 37–47)
HGB BLD-MCNC: 9.3 G/DL — LOW (ref 12–16)
IMM GRANULOCYTES NFR BLD AUTO: 0.6 % — HIGH (ref 0.1–0.3)
LYMPHOCYTES # BLD AUTO: 1 K/UL — LOW (ref 1.2–3.4)
LYMPHOCYTES # BLD AUTO: 7 % — LOW (ref 20.5–51.1)
MAGNESIUM SERPL-MCNC: 2 MG/DL — SIGNIFICANT CHANGE UP (ref 1.8–2.4)
MCHC RBC-ENTMCNC: 23.2 PG — LOW (ref 27–31)
MCHC RBC-ENTMCNC: 27.9 G/DL — LOW (ref 32–37)
MCV RBC AUTO: 83 FL — SIGNIFICANT CHANGE UP (ref 81–99)
MONOCYTES # BLD AUTO: 0.59 K/UL — SIGNIFICANT CHANGE UP (ref 0.1–0.6)
MONOCYTES NFR BLD AUTO: 4.1 % — SIGNIFICANT CHANGE UP (ref 1.7–9.3)
NEUTROPHILS # BLD AUTO: 12.67 K/UL — HIGH (ref 1.4–6.5)
NEUTROPHILS NFR BLD AUTO: 88.1 % — HIGH (ref 42.2–75.2)
NRBC # BLD: 0 /100 WBCS — SIGNIFICANT CHANGE UP (ref 0–0)
PHOSPHATE SERPL-MCNC: 3.8 MG/DL — SIGNIFICANT CHANGE UP (ref 2.1–4.9)
PLATELET # BLD AUTO: 607 K/UL — HIGH (ref 130–400)
POTASSIUM SERPL-MCNC: 4.5 MMOL/L — SIGNIFICANT CHANGE UP (ref 3.5–5)
POTASSIUM SERPL-SCNC: 4.5 MMOL/L — SIGNIFICANT CHANGE UP (ref 3.5–5)
PROT SERPL-MCNC: 6 G/DL — SIGNIFICANT CHANGE UP (ref 6–8)
RBC # BLD: 4.01 M/UL — LOW (ref 4.2–5.4)
RBC # FLD: 18.9 % — HIGH (ref 11.5–14.5)
SODIUM SERPL-SCNC: 142 MMOL/L — SIGNIFICANT CHANGE UP (ref 135–146)
SPECIMEN SOURCE: SIGNIFICANT CHANGE UP
WBC # BLD: 14.37 K/UL — HIGH (ref 4.8–10.8)
WBC # FLD AUTO: 14.37 K/UL — HIGH (ref 4.8–10.8)

## 2023-01-28 PROCEDURE — 99232 SBSQ HOSP IP/OBS MODERATE 35: CPT

## 2023-01-28 RX ADMIN — ENOXAPARIN SODIUM 40 MILLIGRAM(S): 100 INJECTION SUBCUTANEOUS at 11:00

## 2023-01-28 RX ADMIN — PANTOPRAZOLE SODIUM 40 MILLIGRAM(S): 20 TABLET, DELAYED RELEASE ORAL at 05:23

## 2023-01-28 RX ADMIN — Medication 3 MILLILITER(S): at 19:52

## 2023-01-28 RX ADMIN — Medication 1 TABLET(S): at 17:12

## 2023-01-28 RX ADMIN — Medication 5 MILLIGRAM(S): at 17:12

## 2023-01-28 RX ADMIN — Medication 1200 MILLIGRAM(S): at 05:22

## 2023-01-28 RX ADMIN — LOSARTAN POTASSIUM 25 MILLIGRAM(S): 100 TABLET, FILM COATED ORAL at 05:22

## 2023-01-28 RX ADMIN — Medication 1 MILLIGRAM(S): at 17:13

## 2023-01-28 RX ADMIN — CLOPIDOGREL BISULFATE 75 MILLIGRAM(S): 75 TABLET, FILM COATED ORAL at 11:00

## 2023-01-28 RX ADMIN — GABAPENTIN 600 MILLIGRAM(S): 400 CAPSULE ORAL at 05:21

## 2023-01-28 RX ADMIN — GABAPENTIN 600 MILLIGRAM(S): 400 CAPSULE ORAL at 17:12

## 2023-01-28 RX ADMIN — Medication 81 MILLIGRAM(S): at 11:00

## 2023-01-28 RX ADMIN — Medication 2: at 11:58

## 2023-01-28 RX ADMIN — CARVEDILOL PHOSPHATE 12.5 MILLIGRAM(S): 80 CAPSULE, EXTENDED RELEASE ORAL at 17:12

## 2023-01-28 RX ADMIN — Medication 1 MILLIGRAM(S): at 05:21

## 2023-01-28 RX ADMIN — Medication 40 MILLIGRAM(S): at 05:20

## 2023-01-28 RX ADMIN — AZITHROMYCIN 500 MILLIGRAM(S): 500 TABLET, FILM COATED ORAL at 11:00

## 2023-01-28 RX ADMIN — Medication 1 TABLET(S): at 05:21

## 2023-01-28 RX ADMIN — Medication 5 MILLIGRAM(S): at 05:22

## 2023-01-28 RX ADMIN — INSULIN GLARGINE 15 UNIT(S): 100 INJECTION, SOLUTION SUBCUTANEOUS at 23:05

## 2023-01-28 RX ADMIN — SERTRALINE 100 MILLIGRAM(S): 25 TABLET, FILM COATED ORAL at 11:00

## 2023-01-28 RX ADMIN — Medication 40 MILLIGRAM(S): at 17:11

## 2023-01-28 RX ADMIN — Medication 1200 MILLIGRAM(S): at 17:12

## 2023-01-28 RX ADMIN — Medication 40 MILLIGRAM(S): at 05:21

## 2023-01-28 RX ADMIN — ATORVASTATIN CALCIUM 40 MILLIGRAM(S): 80 TABLET, FILM COATED ORAL at 22:10

## 2023-01-28 RX ADMIN — CARVEDILOL PHOSPHATE 12.5 MILLIGRAM(S): 80 CAPSULE, EXTENDED RELEASE ORAL at 05:21

## 2023-01-28 RX ADMIN — RISPERIDONE 1 MILLIGRAM(S): 4 TABLET ORAL at 22:10

## 2023-01-28 NOTE — PROGRESS NOTE ADULT - SUBJECTIVE AND OBJECTIVE BOX
Patient is a 82y old  Female who presents with a chief complaint of SOB (27 Jan 2023 13:46)      OVERNIGHT EVENTS: although feels better, she remained on 2-3 L NC  denies fever, chills, syncope, seizure, headache, cough, abd pain, N/V/D, urinary symptoms, legs swelling,     SUBJECTIVE / INTERVAL HPI: Patient seen and examined at bedside.     VITAL SIGNS:  Vital Signs Last 24 Hrs  T(C): 35.9 (28 Jan 2023 05:25), Max: 36.4 (27 Jan 2023 20:00)  T(F): 96.6 (28 Jan 2023 05:25), Max: 97.6 (27 Jan 2023 20:00)  HR: 68 (28 Jan 2023 05:25) (68 - 87)  BP: 113/55 (28 Jan 2023 05:25) (108/54 - 118/61)  BP(mean): --  RR: 18 (28 Jan 2023 05:25) (18 - 18)  SpO2: 99% (27 Jan 2023 20:00) (88% - 99%)    Parameters below as of 27 Jan 2023 20:00  Patient On (Oxygen Delivery Method): room air        PHYSICAL EXAM:    General: WDWN  HEENT: NC/AT; PERRL, clear conjunctiva  Neck: supple  Cardiovascular: +S1/S2; RRR  Respiratory: CTA b/l; no W/R/R  Gastrointestinal: soft, NT/ND; +BSx4  Extremities: WWP; 2+ peripheral pulses; no edema   Neurological: AAOx3; no focal deficits    MEDICATIONS:  MEDICATIONS  (STANDING):  albuterol/ipratropium for Nebulization 3 milliLiter(s) Nebulizer every 6 hours  ALPRAZolam 1 milliGRAM(s) Oral two times a day  amoxicillin  875 milliGRAM(s)/clavulanate 1 Tablet(s) Oral two times a day  aspirin enteric coated 81 milliGRAM(s) Oral daily  atorvastatin 40 milliGRAM(s) Oral at bedtime  azithromycin   Tablet 500 milliGRAM(s) Oral daily  budesonide 160 MICROgram(s)/formoterol 4.5 MICROgram(s) Inhaler 2 Puff(s) Inhalation two times a day  carvedilol 12.5 milliGRAM(s) Oral every 12 hours  clopidogrel Tablet 75 milliGRAM(s) Oral daily  dextrose 5%. 1000 milliLiter(s) (50 mL/Hr) IV Continuous <Continuous>  dextrose 50% Injectable 25 Gram(s) IV Push once  enoxaparin Injectable 40 milliGRAM(s) SubCutaneous every 24 hours  furosemide   Injectable 40 milliGRAM(s) IV Push daily  gabapentin 600 milliGRAM(s) Oral two times a day  glucagon  Injectable 1 milliGRAM(s) IntraMuscular once  guaiFENesin ER 1200 milliGRAM(s) Oral every 12 hours  insulin glargine Injectable (LANTUS) 15 Unit(s) SubCutaneous at bedtime  insulin lispro (ADMELOG) corrective regimen sliding scale   SubCutaneous three times a day before meals  losartan 25 milliGRAM(s) Oral daily  methylPREDNISolone sodium succinate Injectable 40 milliGRAM(s) IV Push two times a day  oxybutynin 5 milliGRAM(s) Oral two times a day  pantoprazole    Tablet 40 milliGRAM(s) Oral before breakfast  risperiDONE   Tablet 1 milliGRAM(s) Oral at bedtime  sertraline 100 milliGRAM(s) Oral daily  tiotropium 2.5 MICROgram(s) Inhaler 2 Puff(s) Inhalation daily    MEDICATIONS  (PRN):  acetaminophen     Tablet .. 650 milliGRAM(s) Oral every 6 hours PRN Temp greater or equal to 38C (100.4F), Mild Pain (1 - 3)  aluminum hydroxide/magnesium hydroxide/simethicone Suspension 30 milliLiter(s) Oral every 4 hours PRN Dyspepsia  dextrose Oral Gel 15 Gram(s) Oral once PRN Blood Glucose LESS THAN 70 milliGRAM(s)/deciliter  dicyclomine 20 milliGRAM(s) Oral three times a day before meals PRN abdominal pain  melatonin 3 milliGRAM(s) Oral at bedtime PRN Insomnia  ondansetron Injectable 4 milliGRAM(s) IV Push every 8 hours PRN Nausea and/or Vomiting      ALLERGIES:  Allergies    No Known Allergies    Intolerances        LABS:                        9.3    14.37 )-----------( 607      ( 28 Jan 2023 07:33 )             33.3     01-28    142  |  107  |  27<H>  ----------------------------<  116<H>  4.5   |  26  |  1.0    Ca    9.1      28 Jan 2023 07:33  Phos  3.8     01-28  Mg     2.0     01-28    TPro  6.0  /  Alb  3.7  /  TBili  <0.2  /  DBili  x   /  AST  19  /  ALT  23  /  AlkPhos  65  01-28        CAPILLARY BLOOD GLUCOSE      POCT Blood Glucose.: 183 mg/dL (28 Jan 2023 11:51)      RADIOLOGY & ADDITIONAL TESTS: Reviewed.

## 2023-01-28 NOTE — PROGRESS NOTE ADULT - ASSESSMENT
83 yo female Pmhx Copd not home O2, Cad with stent x 1 2019, Dm, Htn presented for SOB    #AHRF due to PNA likley GNR with underlaying UIP  #copd exacerbation  #suspected fluid overload  - remained on 2-3 NC, drop 88-87% on RA ambulation  - abx as ropceihn and azithro  - steroid change to IV 40 bid, need taper on dishcarge given duration  - off vanco, nasal mrsa neg  - on iv lasix start 1/27  - neg LE duplex   - by tomorrow if still need O2 will send home with O2    #HFmREF and wall motion abnormalities  - new, cardio involved, stress trest as outpt       #NORMCYTIC Aanemia  - no evidence of bleeding      #chest pain likley pleuritic   #CAD- s/p stent 2019  #HTN  - Cont losartan 25mg qD  - Cont coreg 12.5mg BID  - Cont DAPT 81mg qD  - Cont lipitor 40mg qHs  - c/w DAPT   - EKG similar to previous 2 yr ago  - follows with Dr Leyva    #DM  - lantus and SS  - monitor FS    #HLD  - c/w atorvastatin    #MISC  DVT PPX Lovenox  GI PPX Protonix  DIET DASH/TLC/CC  CODE DNR/DNI.       #dispo: home, still hypoxic, can dc home on lasix 2x/week and taper dose steroid for 10 days, f/u cardio and pulm as out

## 2023-01-29 LAB
ALBUMIN SERPL ELPH-MCNC: 3.8 G/DL — SIGNIFICANT CHANGE UP (ref 3.5–5.2)
ALP SERPL-CCNC: 65 U/L — SIGNIFICANT CHANGE UP (ref 30–115)
ALT FLD-CCNC: 23 U/L — SIGNIFICANT CHANGE UP (ref 0–41)
ANION GAP SERPL CALC-SCNC: 12 MMOL/L — SIGNIFICANT CHANGE UP (ref 7–14)
AST SERPL-CCNC: 16 U/L — SIGNIFICANT CHANGE UP (ref 0–41)
BASOPHILS # BLD AUTO: 0.01 K/UL — SIGNIFICANT CHANGE UP (ref 0–0.2)
BASOPHILS NFR BLD AUTO: 0.1 % — SIGNIFICANT CHANGE UP (ref 0–1)
BILIRUB SERPL-MCNC: 0.2 MG/DL — SIGNIFICANT CHANGE UP (ref 0.2–1.2)
BUN SERPL-MCNC: 31 MG/DL — HIGH (ref 10–20)
CALCIUM SERPL-MCNC: 9.8 MG/DL — SIGNIFICANT CHANGE UP (ref 8.4–10.5)
CHLORIDE SERPL-SCNC: 102 MMOL/L — SIGNIFICANT CHANGE UP (ref 98–110)
CO2 SERPL-SCNC: 26 MMOL/L — SIGNIFICANT CHANGE UP (ref 17–32)
CREAT SERPL-MCNC: 0.9 MG/DL — SIGNIFICANT CHANGE UP (ref 0.7–1.5)
EGFR: 64 ML/MIN/1.73M2 — SIGNIFICANT CHANGE UP
EOSINOPHIL # BLD AUTO: 0.02 K/UL — SIGNIFICANT CHANGE UP (ref 0–0.7)
EOSINOPHIL NFR BLD AUTO: 0.2 % — SIGNIFICANT CHANGE UP (ref 0–8)
GLUCOSE BLDC GLUCOMTR-MCNC: 128 MG/DL — HIGH (ref 70–99)
GLUCOSE BLDC GLUCOMTR-MCNC: 137 MG/DL — HIGH (ref 70–99)
GLUCOSE BLDC GLUCOMTR-MCNC: 139 MG/DL — HIGH (ref 70–99)
GLUCOSE BLDC GLUCOMTR-MCNC: 197 MG/DL — HIGH (ref 70–99)
GLUCOSE SERPL-MCNC: 121 MG/DL — HIGH (ref 70–99)
HCT VFR BLD CALC: 33.8 % — LOW (ref 37–47)
HGB BLD-MCNC: 9.6 G/DL — LOW (ref 12–16)
IMM GRANULOCYTES NFR BLD AUTO: 0.5 % — HIGH (ref 0.1–0.3)
LYMPHOCYTES # BLD AUTO: 1.82 K/UL — SIGNIFICANT CHANGE UP (ref 1.2–3.4)
LYMPHOCYTES # BLD AUTO: 14.2 % — LOW (ref 20.5–51.1)
MAGNESIUM SERPL-MCNC: 2.2 MG/DL — SIGNIFICANT CHANGE UP (ref 1.8–2.4)
MCHC RBC-ENTMCNC: 23.2 PG — LOW (ref 27–31)
MCHC RBC-ENTMCNC: 28.4 G/DL — LOW (ref 32–37)
MCV RBC AUTO: 81.8 FL — SIGNIFICANT CHANGE UP (ref 81–99)
MONOCYTES # BLD AUTO: 0.88 K/UL — HIGH (ref 0.1–0.6)
MONOCYTES NFR BLD AUTO: 6.9 % — SIGNIFICANT CHANGE UP (ref 1.7–9.3)
NEUTROPHILS # BLD AUTO: 10.02 K/UL — HIGH (ref 1.4–6.5)
NEUTROPHILS NFR BLD AUTO: 78.1 % — HIGH (ref 42.2–75.2)
NRBC # BLD: 0 /100 WBCS — SIGNIFICANT CHANGE UP (ref 0–0)
PHOSPHATE SERPL-MCNC: 3.9 MG/DL — SIGNIFICANT CHANGE UP (ref 2.1–4.9)
PLATELET # BLD AUTO: 620 K/UL — HIGH (ref 130–400)
POTASSIUM SERPL-MCNC: 4.3 MMOL/L — SIGNIFICANT CHANGE UP (ref 3.5–5)
POTASSIUM SERPL-SCNC: 4.3 MMOL/L — SIGNIFICANT CHANGE UP (ref 3.5–5)
PROT SERPL-MCNC: 6 G/DL — SIGNIFICANT CHANGE UP (ref 6–8)
RBC # BLD: 4.13 M/UL — LOW (ref 4.2–5.4)
RBC # FLD: 19.4 % — HIGH (ref 11.5–14.5)
SODIUM SERPL-SCNC: 140 MMOL/L — SIGNIFICANT CHANGE UP (ref 135–146)
WBC # BLD: 12.82 K/UL — HIGH (ref 4.8–10.8)
WBC # FLD AUTO: 12.82 K/UL — HIGH (ref 4.8–10.8)

## 2023-01-29 PROCEDURE — 99232 SBSQ HOSP IP/OBS MODERATE 35: CPT

## 2023-01-29 RX ORDER — LOPERAMIDE HCL 2 MG
2 TABLET ORAL ONCE
Refills: 0 | Status: COMPLETED | OUTPATIENT
Start: 2023-01-29 | End: 2023-01-29

## 2023-01-29 RX ORDER — FUROSEMIDE 40 MG
20 TABLET ORAL DAILY
Refills: 0 | Status: DISCONTINUED | OUTPATIENT
Start: 2023-01-30 | End: 2023-01-31

## 2023-01-29 RX ADMIN — Medication 1200 MILLIGRAM(S): at 05:44

## 2023-01-29 RX ADMIN — ENOXAPARIN SODIUM 40 MILLIGRAM(S): 100 INJECTION SUBCUTANEOUS at 11:09

## 2023-01-29 RX ADMIN — Medication 2 MILLIGRAM(S): at 18:25

## 2023-01-29 RX ADMIN — Medication 1 TABLET(S): at 17:26

## 2023-01-29 RX ADMIN — Medication 5 MILLIGRAM(S): at 05:44

## 2023-01-29 RX ADMIN — GABAPENTIN 600 MILLIGRAM(S): 400 CAPSULE ORAL at 05:44

## 2023-01-29 RX ADMIN — SERTRALINE 100 MILLIGRAM(S): 25 TABLET, FILM COATED ORAL at 11:09

## 2023-01-29 RX ADMIN — Medication 5 MILLIGRAM(S): at 17:26

## 2023-01-29 RX ADMIN — PANTOPRAZOLE SODIUM 40 MILLIGRAM(S): 20 TABLET, DELAYED RELEASE ORAL at 05:44

## 2023-01-29 RX ADMIN — Medication 1 TABLET(S): at 05:43

## 2023-01-29 RX ADMIN — LOSARTAN POTASSIUM 25 MILLIGRAM(S): 100 TABLET, FILM COATED ORAL at 05:44

## 2023-01-29 RX ADMIN — GABAPENTIN 600 MILLIGRAM(S): 400 CAPSULE ORAL at 17:27

## 2023-01-29 RX ADMIN — Medication 1200 MILLIGRAM(S): at 17:26

## 2023-01-29 RX ADMIN — Medication 40 MILLIGRAM(S): at 05:45

## 2023-01-29 RX ADMIN — CARVEDILOL PHOSPHATE 12.5 MILLIGRAM(S): 80 CAPSULE, EXTENDED RELEASE ORAL at 17:26

## 2023-01-29 RX ADMIN — ATORVASTATIN CALCIUM 40 MILLIGRAM(S): 80 TABLET, FILM COATED ORAL at 21:27

## 2023-01-29 RX ADMIN — Medication 81 MILLIGRAM(S): at 11:09

## 2023-01-29 RX ADMIN — Medication 1 MILLIGRAM(S): at 17:26

## 2023-01-29 RX ADMIN — Medication 650 MILLIGRAM(S): at 07:39

## 2023-01-29 RX ADMIN — CARVEDILOL PHOSPHATE 12.5 MILLIGRAM(S): 80 CAPSULE, EXTENDED RELEASE ORAL at 05:44

## 2023-01-29 RX ADMIN — INSULIN GLARGINE 15 UNIT(S): 100 INJECTION, SOLUTION SUBCUTANEOUS at 21:27

## 2023-01-29 RX ADMIN — CLOPIDOGREL BISULFATE 75 MILLIGRAM(S): 75 TABLET, FILM COATED ORAL at 11:09

## 2023-01-29 RX ADMIN — Medication 1 MILLIGRAM(S): at 05:36

## 2023-01-29 RX ADMIN — RISPERIDONE 1 MILLIGRAM(S): 4 TABLET ORAL at 21:27

## 2023-01-29 NOTE — PROGRESS NOTE ADULT - ASSESSMENT
81 yo female Pmhx Copd not home O2, Cad with stent x 1 2019, Dm, Htn presented for SOB    Acute hypoxemic resp failure  PNA ( bl airspace disease)  ILD - UIP ?   Anemia  COPD exacerbaiton  HO COPD  HO CAD s/p PCI    RECOMMENDATIONS:    Chest CT noted; Likely superimposed pneumonia on top of ILD/UIP.   Procalcitonin negative; urine legionella negative  Check nasal MRSA, if negative DC Vancomycin.   Recommend to switch Abx to CAP coverage regimen: Rocephin and Azithromycin.  She is a former heavy smoker; Likely has COPD as well.   Continue Symbicort and Spiriva; albuterol PRN.   Continue with prednisone 40mg and taper over 10 days.   Continue Lovenox Q24h daily; Duplex of the lower extremities pending.    Outpatient PFTs, repeat CT in 4-6 weeks.   Wean off O2 and keep spo2 more than 92%. VBG noted with no hypercapnia.   Check baseline CTD workup for ILD; although unlikely; this can be done outpatient.   HOB at 45, aspiration precautions. Diet per speech and swallow.   Ambulate as tolerated.     #AHRF due to PNA likley GNR with underlaying UIP  #copd exacerbation  #suspected fluid overload  - remained on 2-3 NC, drop 88-87% on RA ambulation  - abx as ropceihn and azithro  - steroid change to IV 40 bid, need taper on dishcarge given duration  - off vanco, nasal mrsa neg  - on iv lasix start 1/27  - neg LE duplex   - by tomorrow if still need O2 will send home with O2    #HFmREF and wall motion abnormalities  - new, cardio involved, stress trest as outpt       #NORMCYTIC Aanemia  - no evidence of bleeding      #chest pain likley pleuritic   #CAD- s/p stent 2019  #HTN  - Cont losartan 25mg qD  - Cont coreg 12.5mg BID  - Cont DAPT 81mg qD  - Cont lipitor 40mg qHs  - c/w DAPT   - EKG similar to previous 2 yr ago  - follows with Dr Leyva    #DM  - lantus and SS  - monitor FS    #HLD  - c/w atorvastatin    #MISC  DVT PPX Lovenox  GI PPX Protonix  DIET DASH/TLC/CC  CODE DNR/DNI.       #dispo: home, still hypoxic, can dc home on lasix 2x/week and taper dose steroid for 10 days, f/u cardio and pulm as out     IMPRESSION:    Acute hypoxemic resp failure  PNA ( bl airspace disease)  ILD - UIP ?   Anemia  COPD exacerbaiton  HO COPD  HO CAD s/p PCI    RECOMMENDATIONS:    Chest CT noted; Likely superimposed pneumonia on top of ILD/UIP.   Procalcitonin negative; urine legionella negative  Check nasal MRSA, if negative DC Vancomycin.   Recommend to switch Abx to CAP coverage regimen: Rocephin and Azithromycin.  She is a former heavy smoker; Likely has COPD as well.   Continue Symbicort and Spiriva; albuterol PRN.   Continue with prednisone 40mg and taper over 10 days.   Continue Lovenox Q24h daily; Duplex of the lower extremities pending.    Outpatient PFTs, repeat CT in 4-6 weeks.   Wean off O2 and keep spo2 more than 92%. VBG noted with no hypercapnia.   Check baseline CTD workup for ILD; although unlikely; this can be done outpatient.   HOB at 45, aspiration precautions. Diet per speech and swallow.   Ambulate as tolerated.     #AHRF due to PNA likley GNR with underlaying UIP  #copd exacerbation  #suspected fluid overload  - remained on 2-3 NC, drop 88-87% on RA ambulation  - abx as ropceihn and azithro  - steroid change to IV 40 bid, need taper on dishcarge given duration  - off vanco, nasal mrsa neg  - on iv lasix start 1/27  - neg LE duplex   - by tomorrow if still need O2 will send home with O2    #HFmREF and wall motion abnormalities  - new, cardio involved, stress trest as outpt       #NORMCYTIC Aanemia  - no evidence of bleeding      #chest pain likley pleuritic   #CAD- s/p stent 2019  #HTN  - Cont losartan 25mg qD  - Cont coreg 12.5mg BID  - Cont DAPT 81mg qD  - Cont lipitor 40mg qHs  - c/w DAPT   - EKG similar to previous 2 yr ago  - follows with Dr Leyva    #DM  - lantus and SS  - monitor FS    #HLD  - c/w atorvastatin    #MISC  DVT PPX Lovenox  GI PPX Protonix  DIET DASH/TLC/CC  CODE DNR/DNI.       #dispo: home, still hypoxic, can dc home on lasix 2x/week and taper dose steroid for 10 days, f/u cardio and pulm as out     IMPRESSION:    Acute Hypoxemic Respiratory Failure, improving  CAP   ILD/UIP ?   Anemia  COPD Exacerbation  HFmrEF  CAD SP PCI  DM II  HTN  HO COPD  HO CAD s/p PCI    PLAN:    Switched Solumedrol to Prednisone 40mg daily today  Complete Augmentin course   Continue Symbicort and Spiriva; albuterol PRN  Nebs PRN  Continue Lovenox Q24h daily; Duplex  negative 1/25   Will need Pulmonary follow up as outpatient and repeat CT in 4-6 weeks.   Wean off O2 and keep spo2 more than 92%  Aspiration precautions  PT/OT  Switched to PO Lasix today  Strict intake and outputs   Continue home anti-hypertensives, DAPT  DNR/DNI      Disposition: Smnolent today, may need home oxygen, PT/OT   IMPRESSION:    Acute Hypoxemic Respiratory Failure, improving  CAP   ILD/UIP ?   Anemia  COPD Exacerbation  HFmrEF  CAD SP PCI  DM II  HTN  HO COPD  HO CAD s/p PCI    PLAN:    Switched Solumedrol to Prednisone 40mg daily today  Complete Augmentin course   Continue Symbicort and Spiriva; albuterol PRN  Nebs PRN  Continue Lovenox Q24h daily; Duplex  negative 1/25   Will need Pulmonary follow up as outpatient and repeat CT in 4-6 weeks.   Wean off O2 and keep spo2 more than 92%  Aspiration precautions  PT/OT  Switched to PO Lasix today  Strict intake and outputs   Continue home anti-hypertensives, DAPT  DNR/DNI    Discussed case with patient's son Jovan.    Disposition: Smnolent today, may need home oxygen, PT/OT, possible discharge in 24-48hours    Lita Holley MD  Attending Hospitalist

## 2023-01-29 NOTE — PROGRESS NOTE ADULT - SUBJECTIVE AND OBJECTIVE BOX
Patient is a 82y old  Female who presents with a chief complaint of SOB (27 Jan 2023 13:46)        Over Night Events: no acute events overnight    T(C): 36.2 (29 Jan 2023 05:00), Max: 36.2 (28 Jan 2023 20:49)  T(F): 97.2 (29 Jan 2023 05:00), Max: 97.2 (28 Jan 2023 20:49)  HR: 72 (29 Jan 2023 05:00) (72 - 86)  BP: 142/88 (29 Jan 2023 05:00) (139/63 - 142/88)  RR: 18 (29 Jan 2023 06:00) (18 - 19)  SpO2: 100% (29 Jan 2023 06:00) (95% - 100%)    O2 Parameters below as of 29 Jan 2023 06:00  Patient On (Oxygen Delivery Method): nasal cannula  O2 Flow (L/min): 2          CONSTITUTIONAL:  NAD    ENT:   Airway patent,   Mouth with normal mucosa.       EYES:   Pupils equal,       CARDIAC:   Normal rate,   Regular rhythm.      RESPIRATORY:   No wheezing  Bilateral BS  Normal chest expansion  Not tachypneic,  No use of accessory muscles    GASTROINTESTINAL:  Abdomen soft,   Non-tender,   No guarding,   + BS      MUSCULOSKELETAL:   Range of motion is limited  No clubbing, cyanosis    NEUROLOGICAL:   arousable, somnolent     SKIN:   Skin normal color for race,   Warm and dry      PSYCHIATRIC:   No apparent risk to self or others.            LABS:                            9.6    12.82 )-----------( 620      ( 29 Jan 2023 06:49 )             33.8                                               01-29    140  |  102  |  31<H>  ----------------------------<  121<H>  4.3   |  26  |  0.9    Ca    9.8      29 Jan 2023 06:49  Phos  3.9     01-29  Mg     2.2     01-29    TPro  6.0  /  Alb  3.8  /  TBili  0.2  /  DBili  x   /  AST  16  /  ALT  23  /  AlkPhos  65  01-29                                                                                           LIVER FUNCTIONS - ( 29 Jan 2023 06:49 )  Alb: 3.8 g/dL / Pro: 6.0 g/dL / ALK PHOS: 65 U/L / ALT: 23 U/L / AST: 16 U/L / GGT: x                                                                                                                                       MEDICATIONS  (STANDING):  albuterol/ipratropium for Nebulization 3 milliLiter(s) Nebulizer every 6 hours  ALPRAZolam 1 milliGRAM(s) Oral two times a day  amoxicillin  875 milliGRAM(s)/clavulanate 1 Tablet(s) Oral two times a day  aspirin enteric coated 81 milliGRAM(s) Oral daily  atorvastatin 40 milliGRAM(s) Oral at bedtime  budesonide 160 MICROgram(s)/formoterol 4.5 MICROgram(s) Inhaler 2 Puff(s) Inhalation two times a day  carvedilol 12.5 milliGRAM(s) Oral every 12 hours  clopidogrel Tablet 75 milliGRAM(s) Oral daily  dextrose 5%. 1000 milliLiter(s) (50 mL/Hr) IV Continuous <Continuous>  dextrose 50% Injectable 25 Gram(s) IV Push once  enoxaparin Injectable 40 milliGRAM(s) SubCutaneous every 24 hours  furosemide   Injectable 40 milliGRAM(s) IV Push daily  gabapentin 600 milliGRAM(s) Oral two times a day  glucagon  Injectable 1 milliGRAM(s) IntraMuscular once  guaiFENesin ER 1200 milliGRAM(s) Oral every 12 hours  insulin glargine Injectable (LANTUS) 15 Unit(s) SubCutaneous at bedtime  insulin lispro (ADMELOG) corrective regimen sliding scale   SubCutaneous three times a day before meals  losartan 25 milliGRAM(s) Oral daily  methylPREDNISolone sodium succinate Injectable 40 milliGRAM(s) IV Push two times a day  oxybutynin 5 milliGRAM(s) Oral two times a day  pantoprazole    Tablet 40 milliGRAM(s) Oral before breakfast  risperiDONE   Tablet 1 milliGRAM(s) Oral at bedtime  sertraline 100 milliGRAM(s) Oral daily  tiotropium 2.5 MICROgram(s) Inhaler 2 Puff(s) Inhalation daily    MEDICATIONS  (PRN):  acetaminophen     Tablet .. 650 milliGRAM(s) Oral every 6 hours PRN Temp greater or equal to 38C (100.4F), Mild Pain (1 - 3)  aluminum hydroxide/magnesium hydroxide/simethicone Suspension 30 milliLiter(s) Oral every 4 hours PRN Dyspepsia  dextrose Oral Gel 15 Gram(s) Oral once PRN Blood Glucose LESS THAN 70 milliGRAM(s)/deciliter  dicyclomine 20 milliGRAM(s) Oral three times a day before meals PRN abdominal pain  melatonin 3 milliGRAM(s) Oral at bedtime PRN Insomnia  ondansetron Injectable 4 milliGRAM(s) IV Push every 8 hours PRN Nausea and/or Vomiting           Patient is a 82y old  Female who presents with a chief complaint of SOB (27 Jan 2023 13:46)      Over Night Events: no acute events overnight    T(C): 36.2 (29 Jan 2023 05:00), Max: 36.2 (28 Jan 2023 20:49)  T(F): 97.2 (29 Jan 2023 05:00), Max: 97.2 (28 Jan 2023 20:49)  HR: 72 (29 Jan 2023 05:00) (72 - 86)  BP: 142/88 (29 Jan 2023 05:00) (139/63 - 142/88)  RR: 18 (29 Jan 2023 06:00) (18 - 19)  SpO2: 100% (29 Jan 2023 06:00) (95% - 100%)    O2 Parameters below as of 29 Jan 2023 06:00  Patient On (Oxygen Delivery Method): nasal cannula  O2 Flow (L/min): 2          CONSTITUTIONAL:  NAD    ENT:   Airway patent,   Mouth with normal mucosa.       EYES:   Pupils equal,       CARDIAC:   Normal rate,   Regular rhythm.      RESPIRATORY:   No wheezing  Bilateral BS  Normal chest expansion  Not tachypneic,  No use of accessory muscles    GASTROINTESTINAL:  Abdomen soft,   Non-tender,   No guarding,   + BS      MUSCULOSKELETAL:   Range of motion is limited  No clubbing, cyanosis    NEUROLOGICAL:   arousable, somnolent     SKIN:   Skin normal color for race,   Warm and dry      PSYCHIATRIC:   No apparent risk to self or others.            LABS:                            9.6    12.82 )-----------( 620      ( 29 Jan 2023 06:49 )             33.8                                               01-29    140  |  102  |  31<H>  ----------------------------<  121<H>  4.3   |  26  |  0.9    Ca    9.8      29 Jan 2023 06:49  Phos  3.9     01-29  Mg     2.2     01-29    TPro  6.0  /  Alb  3.8  /  TBili  0.2  /  DBili  x   /  AST  16  /  ALT  23  /  AlkPhos  65  01-29                                                                                           LIVER FUNCTIONS - ( 29 Jan 2023 06:49 )  Alb: 3.8 g/dL / Pro: 6.0 g/dL / ALK PHOS: 65 U/L / ALT: 23 U/L / AST: 16 U/L / GGT: x                                                                                                                                       MEDICATIONS  (STANDING):  albuterol/ipratropium for Nebulization 3 milliLiter(s) Nebulizer every 6 hours  ALPRAZolam 1 milliGRAM(s) Oral two times a day  amoxicillin  875 milliGRAM(s)/clavulanate 1 Tablet(s) Oral two times a day  aspirin enteric coated 81 milliGRAM(s) Oral daily  atorvastatin 40 milliGRAM(s) Oral at bedtime  budesonide 160 MICROgram(s)/formoterol 4.5 MICROgram(s) Inhaler 2 Puff(s) Inhalation two times a day  carvedilol 12.5 milliGRAM(s) Oral every 12 hours  clopidogrel Tablet 75 milliGRAM(s) Oral daily  dextrose 5%. 1000 milliLiter(s) (50 mL/Hr) IV Continuous <Continuous>  dextrose 50% Injectable 25 Gram(s) IV Push once  enoxaparin Injectable 40 milliGRAM(s) SubCutaneous every 24 hours  furosemide   Injectable 40 milliGRAM(s) IV Push daily  gabapentin 600 milliGRAM(s) Oral two times a day  glucagon  Injectable 1 milliGRAM(s) IntraMuscular once  guaiFENesin ER 1200 milliGRAM(s) Oral every 12 hours  insulin glargine Injectable (LANTUS) 15 Unit(s) SubCutaneous at bedtime  insulin lispro (ADMELOG) corrective regimen sliding scale   SubCutaneous three times a day before meals  losartan 25 milliGRAM(s) Oral daily  methylPREDNISolone sodium succinate Injectable 40 milliGRAM(s) IV Push two times a day  oxybutynin 5 milliGRAM(s) Oral two times a day  pantoprazole    Tablet 40 milliGRAM(s) Oral before breakfast  risperiDONE   Tablet 1 milliGRAM(s) Oral at bedtime  sertraline 100 milliGRAM(s) Oral daily  tiotropium 2.5 MICROgram(s) Inhaler 2 Puff(s) Inhalation daily    MEDICATIONS  (PRN):  acetaminophen     Tablet .. 650 milliGRAM(s) Oral every 6 hours PRN Temp greater or equal to 38C (100.4F), Mild Pain (1 - 3)  aluminum hydroxide/magnesium hydroxide/simethicone Suspension 30 milliLiter(s) Oral every 4 hours PRN Dyspepsia  dextrose Oral Gel 15 Gram(s) Oral once PRN Blood Glucose LESS THAN 70 milliGRAM(s)/deciliter  dicyclomine 20 milliGRAM(s) Oral three times a day before meals PRN abdominal pain  melatonin 3 milliGRAM(s) Oral at bedtime PRN Insomnia  ondansetron Injectable 4 milliGRAM(s) IV Push every 8 hours PRN Nausea and/or Vomiting

## 2023-01-30 ENCOUNTER — TRANSCRIPTION ENCOUNTER (OUTPATIENT)
Age: 83
End: 2023-01-30

## 2023-01-30 LAB
ALBUMIN SERPL ELPH-MCNC: 3.5 G/DL — SIGNIFICANT CHANGE UP (ref 3.5–5.2)
ALP SERPL-CCNC: 61 U/L — SIGNIFICANT CHANGE UP (ref 30–115)
ALT FLD-CCNC: 25 U/L — SIGNIFICANT CHANGE UP (ref 0–41)
ANION GAP SERPL CALC-SCNC: 7 MMOL/L — SIGNIFICANT CHANGE UP (ref 7–14)
AST SERPL-CCNC: 17 U/L — SIGNIFICANT CHANGE UP (ref 0–41)
BASOPHILS # BLD AUTO: 0.02 K/UL — SIGNIFICANT CHANGE UP (ref 0–0.2)
BASOPHILS NFR BLD AUTO: 0.1 % — SIGNIFICANT CHANGE UP (ref 0–1)
BILIRUB SERPL-MCNC: <0.2 MG/DL — SIGNIFICANT CHANGE UP (ref 0.2–1.2)
BUN SERPL-MCNC: 36 MG/DL — HIGH (ref 10–20)
CALCIUM SERPL-MCNC: 9.3 MG/DL — SIGNIFICANT CHANGE UP (ref 8.4–10.4)
CHLORIDE SERPL-SCNC: 103 MMOL/L — SIGNIFICANT CHANGE UP (ref 98–110)
CO2 SERPL-SCNC: 30 MMOL/L — SIGNIFICANT CHANGE UP (ref 17–32)
CREAT SERPL-MCNC: 0.9 MG/DL — SIGNIFICANT CHANGE UP (ref 0.7–1.5)
EGFR: 64 ML/MIN/1.73M2 — SIGNIFICANT CHANGE UP
EOSINOPHIL # BLD AUTO: 0.19 K/UL — SIGNIFICANT CHANGE UP (ref 0–0.7)
EOSINOPHIL NFR BLD AUTO: 1.4 % — SIGNIFICANT CHANGE UP (ref 0–8)
GLUCOSE BLDC GLUCOMTR-MCNC: 118 MG/DL — HIGH (ref 70–99)
GLUCOSE BLDC GLUCOMTR-MCNC: 150 MG/DL — HIGH (ref 70–99)
GLUCOSE BLDC GLUCOMTR-MCNC: 216 MG/DL — HIGH (ref 70–99)
GLUCOSE BLDC GLUCOMTR-MCNC: 96 MG/DL — SIGNIFICANT CHANGE UP (ref 70–99)
GLUCOSE SERPL-MCNC: 80 MG/DL — SIGNIFICANT CHANGE UP (ref 70–99)
HCT VFR BLD CALC: 33.1 % — LOW (ref 37–47)
HGB BLD-MCNC: 9.4 G/DL — LOW (ref 12–16)
IMM GRANULOCYTES NFR BLD AUTO: 0.5 % — HIGH (ref 0.1–0.3)
LYMPHOCYTES # BLD AUTO: 24.2 % — SIGNIFICANT CHANGE UP (ref 20.5–51.1)
LYMPHOCYTES # BLD AUTO: 3.31 K/UL — SIGNIFICANT CHANGE UP (ref 1.2–3.4)
MAGNESIUM SERPL-MCNC: 2.1 MG/DL — SIGNIFICANT CHANGE UP (ref 1.8–2.4)
MCHC RBC-ENTMCNC: 23.2 PG — LOW (ref 27–31)
MCHC RBC-ENTMCNC: 28.4 G/DL — LOW (ref 32–37)
MCV RBC AUTO: 81.7 FL — SIGNIFICANT CHANGE UP (ref 81–99)
MONOCYTES # BLD AUTO: 1.21 K/UL — HIGH (ref 0.1–0.6)
MONOCYTES NFR BLD AUTO: 8.9 % — SIGNIFICANT CHANGE UP (ref 1.7–9.3)
NEUTROPHILS # BLD AUTO: 8.87 K/UL — HIGH (ref 1.4–6.5)
NEUTROPHILS NFR BLD AUTO: 64.9 % — SIGNIFICANT CHANGE UP (ref 42.2–75.2)
NRBC # BLD: 0 /100 WBCS — SIGNIFICANT CHANGE UP (ref 0–0)
PHOSPHATE SERPL-MCNC: 3.8 MG/DL — SIGNIFICANT CHANGE UP (ref 2.1–4.9)
PLATELET # BLD AUTO: 526 K/UL — HIGH (ref 130–400)
POTASSIUM SERPL-MCNC: 4.1 MMOL/L — SIGNIFICANT CHANGE UP (ref 3.5–5)
POTASSIUM SERPL-SCNC: 4.1 MMOL/L — SIGNIFICANT CHANGE UP (ref 3.5–5)
PROT SERPL-MCNC: 5.8 G/DL — LOW (ref 6–8)
RBC # BLD: 4.05 M/UL — LOW (ref 4.2–5.4)
RBC # FLD: 19.1 % — HIGH (ref 11.5–14.5)
SODIUM SERPL-SCNC: 140 MMOL/L — SIGNIFICANT CHANGE UP (ref 135–146)
WBC # BLD: 13.67 K/UL — HIGH (ref 4.8–10.8)
WBC # FLD AUTO: 13.67 K/UL — HIGH (ref 4.8–10.8)

## 2023-01-30 PROCEDURE — 99232 SBSQ HOSP IP/OBS MODERATE 35: CPT

## 2023-01-30 PROCEDURE — 71045 X-RAY EXAM CHEST 1 VIEW: CPT | Mod: 26

## 2023-01-30 RX ORDER — FUROSEMIDE 40 MG
40 TABLET ORAL ONCE
Refills: 0 | Status: COMPLETED | OUTPATIENT
Start: 2023-01-30 | End: 2023-01-30

## 2023-01-30 RX ORDER — KETOROLAC TROMETHAMINE 30 MG/ML
15 SYRINGE (ML) INJECTION ONCE
Refills: 0 | Status: DISCONTINUED | OUTPATIENT
Start: 2023-01-30 | End: 2023-01-30

## 2023-01-30 RX ORDER — LOPERAMIDE HCL 2 MG
2 TABLET ORAL ONCE
Refills: 0 | Status: COMPLETED | OUTPATIENT
Start: 2023-01-30 | End: 2023-01-30

## 2023-01-30 RX ADMIN — Medication 1 MILLIGRAM(S): at 17:13

## 2023-01-30 RX ADMIN — ATORVASTATIN CALCIUM 40 MILLIGRAM(S): 80 TABLET, FILM COATED ORAL at 22:02

## 2023-01-30 RX ADMIN — Medication 5 MILLIGRAM(S): at 17:14

## 2023-01-30 RX ADMIN — PANTOPRAZOLE SODIUM 40 MILLIGRAM(S): 20 TABLET, DELAYED RELEASE ORAL at 05:07

## 2023-01-30 RX ADMIN — GABAPENTIN 600 MILLIGRAM(S): 400 CAPSULE ORAL at 17:14

## 2023-01-30 RX ADMIN — INSULIN GLARGINE 15 UNIT(S): 100 INJECTION, SOLUTION SUBCUTANEOUS at 22:01

## 2023-01-30 RX ADMIN — SERTRALINE 100 MILLIGRAM(S): 25 TABLET, FILM COATED ORAL at 11:26

## 2023-01-30 RX ADMIN — Medication 15 MILLIGRAM(S): at 17:40

## 2023-01-30 RX ADMIN — Medication 81 MILLIGRAM(S): at 11:25

## 2023-01-30 RX ADMIN — RISPERIDONE 1 MILLIGRAM(S): 4 TABLET ORAL at 22:02

## 2023-01-30 RX ADMIN — Medication 1200 MILLIGRAM(S): at 05:05

## 2023-01-30 RX ADMIN — Medication 4: at 11:45

## 2023-01-30 RX ADMIN — GABAPENTIN 600 MILLIGRAM(S): 400 CAPSULE ORAL at 05:05

## 2023-01-30 RX ADMIN — Medication 650 MILLIGRAM(S): at 00:54

## 2023-01-30 RX ADMIN — LOSARTAN POTASSIUM 25 MILLIGRAM(S): 100 TABLET, FILM COATED ORAL at 05:12

## 2023-01-30 RX ADMIN — Medication 20 MILLIGRAM(S): at 05:10

## 2023-01-30 RX ADMIN — CARVEDILOL PHOSPHATE 12.5 MILLIGRAM(S): 80 CAPSULE, EXTENDED RELEASE ORAL at 17:13

## 2023-01-30 RX ADMIN — Medication 1 TABLET(S): at 05:06

## 2023-01-30 RX ADMIN — Medication 40 MILLIGRAM(S): at 13:13

## 2023-01-30 RX ADMIN — Medication 5 MILLIGRAM(S): at 05:07

## 2023-01-30 RX ADMIN — Medication 1200 MILLIGRAM(S): at 17:13

## 2023-01-30 RX ADMIN — CARVEDILOL PHOSPHATE 12.5 MILLIGRAM(S): 80 CAPSULE, EXTENDED RELEASE ORAL at 05:06

## 2023-01-30 RX ADMIN — Medication 2 MILLIGRAM(S): at 13:38

## 2023-01-30 RX ADMIN — Medication 40 MILLIGRAM(S): at 05:06

## 2023-01-30 RX ADMIN — Medication 650 MILLIGRAM(S): at 00:12

## 2023-01-30 RX ADMIN — CLOPIDOGREL BISULFATE 75 MILLIGRAM(S): 75 TABLET, FILM COATED ORAL at 11:25

## 2023-01-30 RX ADMIN — Medication 15 MILLIGRAM(S): at 17:12

## 2023-01-30 RX ADMIN — Medication 3 MILLILITER(S): at 19:35

## 2023-01-30 RX ADMIN — ENOXAPARIN SODIUM 40 MILLIGRAM(S): 100 INJECTION SUBCUTANEOUS at 11:26

## 2023-01-30 RX ADMIN — Medication 1 MILLIGRAM(S): at 05:11

## 2023-01-30 NOTE — DISCHARGE NOTE NURSING/CASE MANAGEMENT/SOCIAL WORK - NSDCPEFALRISK_GEN_ALL_CORE
For information on Fall & Injury Prevention, visit: [email or web address] OR  [email or web address] OR  https://www.cdc.gov/steadi/patient.html

## 2023-01-30 NOTE — PROGRESS NOTE ADULT - SUBJECTIVE AND OBJECTIVE BOX
Patient is a 82y old  Female who presents with a chief complaint of SOB (27 Jan 2023 13:46)      OVERNIGHT EVENTS: today off O2 but still feels difficulty breathing  denies fever, chills, syncope, seizure, headache, cough, , abd pain, N/V/D, urinary symptoms, legs swelling,     SUBJECTIVE / INTERVAL HPI: Patient seen and examined at bedside.     VITAL SIGNS:  Vital Signs Last 24 Hrs  T(C): 36.2 (30 Jan 2023 05:00), Max: 36.4 (29 Jan 2023 20:38)  T(F): 97.1 (30 Jan 2023 05:00), Max: 97.5 (29 Jan 2023 20:38)  HR: 61 (30 Jan 2023 05:00) (61 - 71)  BP: 133/58 (30 Jan 2023 05:00) (118/58 - 133/58)  BP(mean): --  RR: 18 (30 Jan 2023 05:00) (18 - 18)  SpO2: 93% (30 Jan 2023 10:25) (93% - 98%)    Parameters below as of 30 Jan 2023 10:25  Patient On (Oxygen Delivery Method): room air        PHYSICAL EXAM:    General: WDWN  HEENT: NC/AT; PERRL, clear conjunctiva  Neck: supple  Cardiovascular: +S1/S2; RRR  Respiratory: CTA b/l; mild exp wheezing and scattered crackles   Gastrointestinal: soft, NT/ND; +BSx4  Extremities: WWP; 2+ peripheral pulses; no edema   Neurological: AAOx3; no focal deficits    MEDICATIONS:  MEDICATIONS  (STANDING):  albuterol/ipratropium for Nebulization 3 milliLiter(s) Nebulizer every 6 hours  ALPRAZolam 1 milliGRAM(s) Oral two times a day  amoxicillin  875 milliGRAM(s)/clavulanate 1 Tablet(s) Oral two times a day  aspirin enteric coated 81 milliGRAM(s) Oral daily  atorvastatin 40 milliGRAM(s) Oral at bedtime  budesonide 160 MICROgram(s)/formoterol 4.5 MICROgram(s) Inhaler 2 Puff(s) Inhalation two times a day  carvedilol 12.5 milliGRAM(s) Oral every 12 hours  clopidogrel Tablet 75 milliGRAM(s) Oral daily  dextrose 5%. 1000 milliLiter(s) (50 mL/Hr) IV Continuous <Continuous>  dextrose 50% Injectable 25 Gram(s) IV Push once  enoxaparin Injectable 40 milliGRAM(s) SubCutaneous every 24 hours  furosemide    Tablet 20 milliGRAM(s) Oral daily  gabapentin 600 milliGRAM(s) Oral two times a day  glucagon  Injectable 1 milliGRAM(s) IntraMuscular once  guaiFENesin ER 1200 milliGRAM(s) Oral every 12 hours  insulin glargine Injectable (LANTUS) 15 Unit(s) SubCutaneous at bedtime  insulin lispro (ADMELOG) corrective regimen sliding scale   SubCutaneous three times a day before meals  loperamide 2 milliGRAM(s) Oral once  losartan 25 milliGRAM(s) Oral daily  oxybutynin 5 milliGRAM(s) Oral two times a day  pantoprazole    Tablet 40 milliGRAM(s) Oral before breakfast  predniSONE   Tablet 40 milliGRAM(s) Oral daily  risperiDONE   Tablet 1 milliGRAM(s) Oral at bedtime  sertraline 100 milliGRAM(s) Oral daily  tiotropium 2.5 MICROgram(s) Inhaler 2 Puff(s) Inhalation daily    MEDICATIONS  (PRN):  acetaminophen     Tablet .. 650 milliGRAM(s) Oral every 6 hours PRN Temp greater or equal to 38C (100.4F), Mild Pain (1 - 3)  aluminum hydroxide/magnesium hydroxide/simethicone Suspension 30 milliLiter(s) Oral every 4 hours PRN Dyspepsia  dextrose Oral Gel 15 Gram(s) Oral once PRN Blood Glucose LESS THAN 70 milliGRAM(s)/deciliter  dicyclomine 20 milliGRAM(s) Oral three times a day before meals PRN abdominal pain  melatonin 3 milliGRAM(s) Oral at bedtime PRN Insomnia  ondansetron Injectable 4 milliGRAM(s) IV Push every 8 hours PRN Nausea and/or Vomiting      ALLERGIES:  Allergies    No Known Allergies    Intolerances        LABS:                        9.4    13.67 )-----------( 526      ( 30 Jan 2023 05:55 )             33.1     01-30    140  |  103  |  36<H>  ----------------------------<  80  4.1   |  30  |  0.9    Ca    9.3      30 Jan 2023 05:55  Phos  3.8     01-30  Mg     2.1     01-30    TPro  5.8<L>  /  Alb  3.5  /  TBili  <0.2  /  DBili  x   /  AST  17  /  ALT  25  /  AlkPhos  61  01-30        CAPILLARY BLOOD GLUCOSE      POCT Blood Glucose.: 216 mg/dL (30 Jan 2023 11:30)      RADIOLOGY & ADDITIONAL TESTS: Reviewed.

## 2023-01-30 NOTE — DISCHARGE NOTE NURSING/CASE MANAGEMENT/SOCIAL WORK - PATIENT PORTAL LINK FT
You can access the FollowMyHealth Patient Portal offered by [facility] by registering at the following website: [email or web address]. By joining [employer]’s FollowMyHealth portal, you will also be able to view your health information using other applications (apps) compatible with our system.

## 2023-01-30 NOTE — DISCHARGE NOTE NURSING/CASE MANAGEMENT/SOCIAL WORK - OTHER MODE OF TRANSPORTATION
son Gabapentin Pregnancy And Lactation Text: This medication is Pregnancy Category C and isn't considered safe during pregnancy. It is excreted in breast milk.

## 2023-01-30 NOTE — PROGRESS NOTE ADULT - ASSESSMENT
81 yo female Pmhx Copd not home O2, Cad with stent x 1 2019, Dm, Htn presented for SOB    #AHRF due to PNA lópez GNR with underlaying UIP  #copd exacerbation  #suspected fluid overload  - off O2 today  - finished abx course   - IV steroid, need taper on discharge given duration  - off vanco, nasal mrsa neg  - on iv lasix start 1/27, to PO in AM  - neg LE duplex   - dc in AM, still feel chest tightness      #HFmREF and wall motion abnormalities  - new, cardio involved, stress test as outpt       #NORMCYTIC Aanemia  - no evidence of bleeding      #chest pain likley pleuritic   #CAD- s/p stent 2019  #HTN  - Cont losartan 25mg qD  - Cont coreg 12.5mg BID  - Cont DAPT 81mg qD  - Cont lipitor 40mg qHs  - c/w DAPT   - EKG similar to previous 2 yr ago  - follows with Dr Leyva    #DM  - lantus and SS  - monitor FS    #HLD  - c/w atorvastatin    #MISC  DVT PPX Lovenox  GI PPX Protonix  DIET DASH/TLC/CC  CODE DNR/DNI.       #dispo: home, still feels chest tighness, one more day of iv steroid and lasix

## 2023-01-31 LAB
ALBUMIN SERPL ELPH-MCNC: 3.7 G/DL — SIGNIFICANT CHANGE UP (ref 3.5–5.2)
ALP SERPL-CCNC: 65 U/L — SIGNIFICANT CHANGE UP (ref 30–115)
ALT FLD-CCNC: 26 U/L — SIGNIFICANT CHANGE UP (ref 0–41)
ANION GAP SERPL CALC-SCNC: 13 MMOL/L — SIGNIFICANT CHANGE UP (ref 7–14)
AST SERPL-CCNC: 18 U/L — SIGNIFICANT CHANGE UP (ref 0–41)
BASOPHILS # BLD AUTO: 0.01 K/UL — SIGNIFICANT CHANGE UP (ref 0–0.2)
BASOPHILS NFR BLD AUTO: 0.1 % — SIGNIFICANT CHANGE UP (ref 0–1)
BILIRUB SERPL-MCNC: 0.2 MG/DL — SIGNIFICANT CHANGE UP (ref 0.2–1.2)
BUN SERPL-MCNC: 42 MG/DL — HIGH (ref 10–20)
CALCIUM SERPL-MCNC: 9.7 MG/DL — SIGNIFICANT CHANGE UP (ref 8.4–10.5)
CHLORIDE SERPL-SCNC: 102 MMOL/L — SIGNIFICANT CHANGE UP (ref 98–110)
CO2 SERPL-SCNC: 28 MMOL/L — SIGNIFICANT CHANGE UP (ref 17–32)
CREAT SERPL-MCNC: 1 MG/DL — SIGNIFICANT CHANGE UP (ref 0.7–1.5)
EGFR: 57 ML/MIN/1.73M2 — LOW
EOSINOPHIL # BLD AUTO: 0.11 K/UL — SIGNIFICANT CHANGE UP (ref 0–0.7)
EOSINOPHIL NFR BLD AUTO: 0.8 % — SIGNIFICANT CHANGE UP (ref 0–8)
GLUCOSE BLDC GLUCOMTR-MCNC: 138 MG/DL — HIGH (ref 70–99)
GLUCOSE BLDC GLUCOMTR-MCNC: 78 MG/DL — SIGNIFICANT CHANGE UP (ref 70–99)
GLUCOSE BLDC GLUCOMTR-MCNC: 91 MG/DL — SIGNIFICANT CHANGE UP (ref 70–99)
GLUCOSE SERPL-MCNC: 85 MG/DL — SIGNIFICANT CHANGE UP (ref 70–99)
HCT VFR BLD CALC: 34.3 % — LOW (ref 37–47)
HGB BLD-MCNC: 10.1 G/DL — LOW (ref 12–16)
IMM GRANULOCYTES NFR BLD AUTO: 0.5 % — HIGH (ref 0.1–0.3)
LYMPHOCYTES # BLD AUTO: 2.75 K/UL — SIGNIFICANT CHANGE UP (ref 1.2–3.4)
LYMPHOCYTES # BLD AUTO: 20 % — LOW (ref 20.5–51.1)
MAGNESIUM SERPL-MCNC: 2.3 MG/DL — SIGNIFICANT CHANGE UP (ref 1.8–2.4)
MCHC RBC-ENTMCNC: 23.4 PG — LOW (ref 27–31)
MCHC RBC-ENTMCNC: 29.4 G/DL — LOW (ref 32–37)
MCV RBC AUTO: 79.4 FL — LOW (ref 81–99)
MONOCYTES # BLD AUTO: 1.18 K/UL — HIGH (ref 0.1–0.6)
MONOCYTES NFR BLD AUTO: 8.6 % — SIGNIFICANT CHANGE UP (ref 1.7–9.3)
NEUTROPHILS # BLD AUTO: 9.6 K/UL — HIGH (ref 1.4–6.5)
NEUTROPHILS NFR BLD AUTO: 70 % — SIGNIFICANT CHANGE UP (ref 42.2–75.2)
NRBC # BLD: 0 /100 WBCS — SIGNIFICANT CHANGE UP (ref 0–0)
PLATELET # BLD AUTO: 516 K/UL — HIGH (ref 130–400)
POTASSIUM SERPL-MCNC: 3.6 MMOL/L — SIGNIFICANT CHANGE UP (ref 3.5–5)
POTASSIUM SERPL-SCNC: 3.6 MMOL/L — SIGNIFICANT CHANGE UP (ref 3.5–5)
PROT SERPL-MCNC: 6.1 G/DL — SIGNIFICANT CHANGE UP (ref 6–8)
RBC # BLD: 4.32 M/UL — SIGNIFICANT CHANGE UP (ref 4.2–5.4)
RBC # FLD: 19.1 % — HIGH (ref 11.5–14.5)
SODIUM SERPL-SCNC: 143 MMOL/L — SIGNIFICANT CHANGE UP (ref 135–146)
WBC # BLD: 13.72 K/UL — HIGH (ref 4.8–10.8)
WBC # FLD AUTO: 13.72 K/UL — HIGH (ref 4.8–10.8)

## 2023-01-31 PROCEDURE — 99232 SBSQ HOSP IP/OBS MODERATE 35: CPT

## 2023-01-31 RX ORDER — LOPERAMIDE HCL 2 MG
2 TABLET ORAL ONCE
Refills: 0 | Status: COMPLETED | OUTPATIENT
Start: 2023-01-31 | End: 2023-01-31

## 2023-01-31 RX ORDER — ALPRAZOLAM 0.25 MG
1 TABLET ORAL
Refills: 0 | Status: DISCONTINUED | OUTPATIENT
Start: 2023-01-31 | End: 2023-02-01

## 2023-01-31 RX ADMIN — Medication 81 MILLIGRAM(S): at 12:29

## 2023-01-31 RX ADMIN — PANTOPRAZOLE SODIUM 40 MILLIGRAM(S): 20 TABLET, DELAYED RELEASE ORAL at 06:31

## 2023-01-31 RX ADMIN — ENOXAPARIN SODIUM 40 MILLIGRAM(S): 100 INJECTION SUBCUTANEOUS at 12:28

## 2023-01-31 RX ADMIN — Medication 40 MILLIGRAM(S): at 06:31

## 2023-01-31 RX ADMIN — Medication 1200 MILLIGRAM(S): at 06:32

## 2023-01-31 RX ADMIN — CARVEDILOL PHOSPHATE 12.5 MILLIGRAM(S): 80 CAPSULE, EXTENDED RELEASE ORAL at 06:32

## 2023-01-31 RX ADMIN — RISPERIDONE 1 MILLIGRAM(S): 4 TABLET ORAL at 21:27

## 2023-01-31 RX ADMIN — Medication 3 MILLILITER(S): at 20:07

## 2023-01-31 RX ADMIN — Medication 1 MILLIGRAM(S): at 21:27

## 2023-01-31 RX ADMIN — Medication 2 MILLIGRAM(S): at 10:54

## 2023-01-31 RX ADMIN — GABAPENTIN 600 MILLIGRAM(S): 400 CAPSULE ORAL at 18:11

## 2023-01-31 RX ADMIN — Medication 5 MILLIGRAM(S): at 06:31

## 2023-01-31 RX ADMIN — CARVEDILOL PHOSPHATE 12.5 MILLIGRAM(S): 80 CAPSULE, EXTENDED RELEASE ORAL at 18:11

## 2023-01-31 RX ADMIN — SERTRALINE 100 MILLIGRAM(S): 25 TABLET, FILM COATED ORAL at 13:56

## 2023-01-31 RX ADMIN — Medication 5 MILLIGRAM(S): at 18:11

## 2023-01-31 RX ADMIN — CLOPIDOGREL BISULFATE 75 MILLIGRAM(S): 75 TABLET, FILM COATED ORAL at 12:29

## 2023-01-31 RX ADMIN — Medication 1200 MILLIGRAM(S): at 18:11

## 2023-01-31 RX ADMIN — ATORVASTATIN CALCIUM 40 MILLIGRAM(S): 80 TABLET, FILM COATED ORAL at 21:27

## 2023-01-31 RX ADMIN — Medication 20 MILLIGRAM(S): at 06:32

## 2023-01-31 RX ADMIN — GABAPENTIN 600 MILLIGRAM(S): 400 CAPSULE ORAL at 06:31

## 2023-01-31 RX ADMIN — LOSARTAN POTASSIUM 25 MILLIGRAM(S): 100 TABLET, FILM COATED ORAL at 06:31

## 2023-01-31 NOTE — PROGRESS NOTE ADULT - ASSESSMENT
81 yo female Pmhx Copd not home O2, Cad with stent x 1 2019, Dm, Htn presented for SOB    #AHRF due to PNA likley GNR with underlaying UIP  #copd exacerbation  #suspected fluid overload  - desat to < 88 on ambulation, needs Homne O2  - finished abx course   - IV steroid, need taper on discharge given duration  - off vanco, nasal mrsa neg  - s/p iv lasix start 1/27, hold for now, will need 20mg 2x per week on discharge   - neg LE duplex     ******  Patient is still hypoxic  Patient's oxygen saturation was 76% on room air  Patient was tested in a chronic, stable state.  Patient is aware they will be going home on O2.  ******    #HFmREF and wall motion abnormalities  - new, cardio involved, stress test as outpt   - lasix, arbs and BB      #NORMCYTIC Aanemia  - no evidence of bleeding      #chest pain likley pleuritic   #CAD- s/p stent 2019  #HTN  - Cont losartan 25mg qD  - Cont coreg 12.5mg BID  - Cont DAPT 81mg qD  - Cont lipitor 40mg qHs  - c/w DAPT   - EKG similar to previous 2 yr ago  - follows with Dr Leyva    #DM  - lantus and SS  - monitor FS    #HLD  - c/w atorvastatin    #MISC  DVT PPX Lovenox  GI PPX Protonix  DIET DASH/TLC/CC  CODE DNR/DNI.   DC once home O2 delivered

## 2023-01-31 NOTE — PROGRESS NOTE ADULT - PROVIDER SPECIALTY LIST ADULT
Hospitalist
Internal Medicine
Pulmonology
Pulmonology
Internal Medicine
Internal Medicine

## 2023-01-31 NOTE — PROGRESS NOTE ADULT - ASSESSMENT
81 yo female Pmhx Copd not home O2, Cad with stent x 1 2019, Dm, Htn presented for SOB    #AHRF due to PNA lópez GNR with underlaying UIP  #copd exacerbation  #suspected fluid overload  - off O2 today  - finished abx course   - off vanco, nasal mrsa neg  - IV steroid, need taper on discharge given duration  - on iv lasix start 1/27, to PO in AM  - neg LE duplex   - amb on O2, DC with or without O2    #HFrEF and wall motion abnormalities  - new, cardio involved, stress test as outpt     #Normocytic anemia  - no evidence of bleeding    #chest pain  #CAD- s/p stent 2019  #HTN  - Cont losartan 25mg qD  - Cont coreg 12.5mg BID  - Cont DAPT 81mg qD  - Cont lipitor 40mg qHs  - c/w DAPT   - EKG similar to previous 2 yr ago  - follows with Dr Leyva    #DM  - lantus and SS  - monitor FS    #HLD  - c/w atorvastatin    #MISC  DVT PPX Lovenox  GI PPX Protonix  DIET DASH/TLC/CC  CODE DNR/DNI.       #dispo: home, last day of iv steroid and lasix, amb on O2

## 2023-01-31 NOTE — PROGRESS NOTE ADULT - SUBJECTIVE AND OBJECTIVE BOX
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 States she had diarrhea episode during the night, fears flare of IBS, requests imodium. Otherwise no complaints, states she is breathing well.                                             ----------------------------------------------------------  OBJECTIVE  PAST MEDICAL & SURGICAL HISTORY  DM (diabetes mellitus)    CAD (coronary artery disease)    HLD (hyperlipidemia)    HTN (hypertension)    Neuropathy    COPD (chronic obstructive pulmonary disease)                                              -----------------------------------------------------------  ALLERGIES:  No Known Allergies                                            ------------------------------------------------------------    HOME MEDICATIONS  Home Medications:  albuterol 90 mcg/inh inhalation aerosol: 2 puff(s) inhaled every 6 hours, As Needed (23 Jan 2023 02:10)  aspirin 81 mg oral delayed release tablet: 1 tab(s) orally once a day (23 Jan 2023 02:10)  carvedilol 25 mg oral tablet: 0.5 tab(s) orally 2 times a day (23 Jan 2023 02:10)  dicyclomine 20 mg oral tablet: 1 tab(s) orally 3 times a day (before meals), As needed, abdominal pain (28 Jan 2023 12:33)  gabapentin 600 mg oral tablet: 1 tab(s) orally 2 times a day (23 Jan 2023 02:10)  losartan 25 mg oral tablet: 1 tab(s) orally once a day (23 Jan 2023 02:10)  pantoprazole 40 mg oral delayed release tablet: 1 tab(s) orally once a day (23 Jan 2023 02:10)  risperiDONE 1 mg oral tablet: 1 tab(s) orally once a day (at bedtime) (23 Jan 2023 02:10)  solifenacin 5 mg oral tablet: 1 tab(s) orally once a day (23 Jan 2023 02:10)  Tradjenta 5 mg oral tablet: 1 tab(s) orally once a day (23 Jan 2023 02:10)  Trelegy Ellipta 200 mcg-62.5 mcg-25 mcg/inh inhalation powder: 1 puff(s) inhaled once a day (23 Jan 2023 02:10)  Xanax 1 mg oral tablet: 1 tab(s) orally 2 times a day (23 Jan 2023 02:10)  Zoloft 100 mg oral tablet: 1 tab(s) orally once a day (23 Jan 2023 02:10)                           MEDICATIONS:  STANDING MEDICATIONS  albuterol/ipratropium for Nebulization 3 milliLiter(s) Nebulizer every 6 hours  aspirin enteric coated 81 milliGRAM(s) Oral daily  atorvastatin 40 milliGRAM(s) Oral at bedtime  budesonide 160 MICROgram(s)/formoterol 4.5 MICROgram(s) Inhaler 2 Puff(s) Inhalation two times a day  carvedilol 12.5 milliGRAM(s) Oral every 12 hours  clopidogrel Tablet 75 milliGRAM(s) Oral daily  dextrose 5%. 1000 milliLiter(s) IV Continuous <Continuous>  dextrose 50% Injectable 25 Gram(s) IV Push once  enoxaparin Injectable 40 milliGRAM(s) SubCutaneous every 24 hours  furosemide    Tablet 20 milliGRAM(s) Oral daily  gabapentin 600 milliGRAM(s) Oral two times a day  glucagon  Injectable 1 milliGRAM(s) IntraMuscular once  guaiFENesin ER 1200 milliGRAM(s) Oral every 12 hours  insulin glargine Injectable (LANTUS) 15 Unit(s) SubCutaneous at bedtime  insulin lispro (ADMELOG) corrective regimen sliding scale   SubCutaneous three times a day before meals  loperamide 2 milliGRAM(s) Oral once  losartan 25 milliGRAM(s) Oral daily  oxybutynin 5 milliGRAM(s) Oral two times a day  pantoprazole    Tablet 40 milliGRAM(s) Oral before breakfast  predniSONE   Tablet 40 milliGRAM(s) Oral daily  risperiDONE   Tablet 1 milliGRAM(s) Oral at bedtime  sertraline 100 milliGRAM(s) Oral daily  tiotropium 2.5 MICROgram(s) Inhaler 2 Puff(s) Inhalation daily    PRN MEDICATIONS  acetaminophen     Tablet .. 650 milliGRAM(s) Oral every 6 hours PRN  aluminum hydroxide/magnesium hydroxide/simethicone Suspension 30 milliLiter(s) Oral every 4 hours PRN  dextrose Oral Gel 15 Gram(s) Oral once PRN  dicyclomine 20 milliGRAM(s) Oral three times a day before meals PRN  melatonin 3 milliGRAM(s) Oral at bedtime PRN  ondansetron Injectable 4 milliGRAM(s) IV Push every 8 hours PRN                                            ------------------------------------------------------------  VITAL SIGNS: Last 24 Hours  T(C): 36.2 (31 Jan 2023 04:30), Max: 36.6 (30 Jan 2023 21:32)  T(F): 97.1 (31 Jan 2023 04:30), Max: 97.9 (30 Jan 2023 21:32)  HR: 68 (31 Jan 2023 04:30) (68 - 93)  BP: 115/55 (31 Jan 2023 04:30) (90/52 - 115/56)  BP(mean): --  RR: 18 (31 Jan 2023 04:30) (18 - 18)  SpO2: 92% (31 Jan 2023 08:20) (92% - 100%)                                             --------------------------------------------------------------  LABS:                        10.1   13.72 )-----------( 516      ( 31 Jan 2023 08:43 )             34.3     01-31    143  |  102  |  42<H>  ----------------------------<  85  3.6   |  28  |  1.0    Ca    9.7      31 Jan 2023 08:43  Phos  3.8     01-30  Mg     2.3     01-31    TPro  6.1  /  Alb  3.7  /  TBili  0.2  /  DBili  x   /  AST  18  /  ALT  26  /  AlkPhos  65  01-31                                                              -------------------------------------------------------------  RADIOLOGY:                                            --------------------------------------------------------------    PHYSICAL EXAM:  GENERAL: NAD, lying in bed comfortably  CHEST/LUNG: mild diffuse wheeze; No rales, rhonchi, rubs. Unlabored respirations  HEART: regular rate and rhythm; No murmurs, rubs, or gallops  ABDOMEN: Bowel sounds present; Soft, Nontender, Nondistended.    EXTREMITIES: Warm. No clubbing, cyanosis, or edema  NERVOUS SYSTEM:  Alert & Oriented X3. No focal deficits   SKIN: No rashes or lesions                                           --------------------------------------------------------------

## 2023-01-31 NOTE — PROGRESS NOTE ADULT - SUBJECTIVE AND OBJECTIVE BOX
Patient is a 82y old  Female who presents with a chief complaint of COPD exacerbation (31 Jan 2023 10:18)      OVERNIGHT EVENTS: feels better but desat < 88 on ambulation    SUBJECTIVE / INTERVAL HPI: Patient seen and examined at bedside.     VITAL SIGNS:  Vital Signs Last 24 Hrs  T(C): 36.2 (31 Jan 2023 04:30), Max: 36.6 (30 Jan 2023 21:32)  T(F): 97.1 (31 Jan 2023 04:30), Max: 97.9 (30 Jan 2023 21:32)  HR: 68 (31 Jan 2023 04:30) (68 - 93)  BP: 115/55 (31 Jan 2023 04:30) (90/52 - 115/56)  BP(mean): --  RR: 18 (31 Jan 2023 04:30) (18 - 18)  SpO2: 94% (31 Jan 2023 12:15) (85% - 100%)    Parameters below as of 31 Jan 2023 12:15  Patient On (Oxygen Delivery Method): room air        PHYSICAL EXAM:    General: WDWN  HEENT: NC/AT; PERRL, clear conjunctiva  Neck: supple  Cardiovascular: +S1/S2; RRR  Respiratory: CTA b/l; mild scattered exp wheeze  Gastrointestinal: soft, NT/ND; +BSx4  Extremities: WWP; 2+ peripheral pulses; no edema   Neurological: AAOx3; no focal deficits    MEDICATIONS:  MEDICATIONS  (STANDING):  albuterol/ipratropium for Nebulization 3 milliLiter(s) Nebulizer every 6 hours  aspirin enteric coated 81 milliGRAM(s) Oral daily  atorvastatin 40 milliGRAM(s) Oral at bedtime  budesonide 160 MICROgram(s)/formoterol 4.5 MICROgram(s) Inhaler 2 Puff(s) Inhalation two times a day  carvedilol 12.5 milliGRAM(s) Oral every 12 hours  clopidogrel Tablet 75 milliGRAM(s) Oral daily  dextrose 5%. 1000 milliLiter(s) (50 mL/Hr) IV Continuous <Continuous>  dextrose 50% Injectable 25 Gram(s) IV Push once  enoxaparin Injectable 40 milliGRAM(s) SubCutaneous every 24 hours  furosemide    Tablet 20 milliGRAM(s) Oral daily  gabapentin 600 milliGRAM(s) Oral two times a day  glucagon  Injectable 1 milliGRAM(s) IntraMuscular once  guaiFENesin ER 1200 milliGRAM(s) Oral every 12 hours  insulin glargine Injectable (LANTUS) 15 Unit(s) SubCutaneous at bedtime  insulin lispro (ADMELOG) corrective regimen sliding scale   SubCutaneous three times a day before meals  losartan 25 milliGRAM(s) Oral daily  oxybutynin 5 milliGRAM(s) Oral two times a day  pantoprazole    Tablet 40 milliGRAM(s) Oral before breakfast  predniSONE   Tablet 40 milliGRAM(s) Oral daily  risperiDONE   Tablet 1 milliGRAM(s) Oral at bedtime  sertraline 100 milliGRAM(s) Oral daily  tiotropium 2.5 MICROgram(s) Inhaler 2 Puff(s) Inhalation daily    MEDICATIONS  (PRN):  acetaminophen     Tablet .. 650 milliGRAM(s) Oral every 6 hours PRN Temp greater or equal to 38C (100.4F), Mild Pain (1 - 3)  aluminum hydroxide/magnesium hydroxide/simethicone Suspension 30 milliLiter(s) Oral every 4 hours PRN Dyspepsia  dextrose Oral Gel 15 Gram(s) Oral once PRN Blood Glucose LESS THAN 70 milliGRAM(s)/deciliter  dicyclomine 20 milliGRAM(s) Oral three times a day before meals PRN abdominal pain  melatonin 3 milliGRAM(s) Oral at bedtime PRN Insomnia  ondansetron Injectable 4 milliGRAM(s) IV Push every 8 hours PRN Nausea and/or Vomiting      ALLERGIES:  Allergies    No Known Allergies    Intolerances        LABS:                        10.1   13.72 )-----------( 516      ( 31 Jan 2023 08:43 )             34.3     01-31    143  |  102  |  42<H>  ----------------------------<  85  3.6   |  28  |  1.0    Ca    9.7      31 Jan 2023 08:43  Phos  3.8     01-30  Mg     2.3     01-31    TPro  6.1  /  Alb  3.7  /  TBili  0.2  /  DBili  x   /  AST  18  /  ALT  26  /  AlkPhos  65  01-31        CAPILLARY BLOOD GLUCOSE  86 (31 Jan 2023 08:03)      POCT Blood Glucose.: 91 mg/dL (31 Jan 2023 11:34)      RADIOLOGY & ADDITIONAL TESTS: Reviewed.    < from: Xray Chest 1 View- PORTABLE-Routine (Xray Chest 1 View- PORTABLE-Routine .) (01.27.23 @ 16:06) >  Impression:  Stable bilateral opacities. No definite pneumothorax.            --- End of Report ---    < end of copied text >

## 2023-02-01 VITALS
OXYGEN SATURATION: 81 % | HEART RATE: 104 BPM | SYSTOLIC BLOOD PRESSURE: 97 MMHG | DIASTOLIC BLOOD PRESSURE: 61 MMHG | TEMPERATURE: 97 F

## 2023-02-01 LAB
ALBUMIN SERPL ELPH-MCNC: 3.3 G/DL — LOW (ref 3.5–5.2)
ALP SERPL-CCNC: 63 U/L — SIGNIFICANT CHANGE UP (ref 30–115)
ALT FLD-CCNC: 24 U/L — SIGNIFICANT CHANGE UP (ref 0–41)
ANION GAP SERPL CALC-SCNC: 16 MMOL/L — HIGH (ref 7–14)
AST SERPL-CCNC: 22 U/L — SIGNIFICANT CHANGE UP (ref 0–41)
BASOPHILS # BLD AUTO: 0.02 K/UL — SIGNIFICANT CHANGE UP (ref 0–0.2)
BASOPHILS NFR BLD AUTO: 0.2 % — SIGNIFICANT CHANGE UP (ref 0–1)
BILIRUB SERPL-MCNC: 0.4 MG/DL — SIGNIFICANT CHANGE UP (ref 0.2–1.2)
BUN SERPL-MCNC: 44 MG/DL — HIGH (ref 10–20)
CALCIUM SERPL-MCNC: 9.3 MG/DL — SIGNIFICANT CHANGE UP (ref 8.4–10.5)
CHLORIDE SERPL-SCNC: 102 MMOL/L — SIGNIFICANT CHANGE UP (ref 98–110)
CO2 SERPL-SCNC: 21 MMOL/L — SIGNIFICANT CHANGE UP (ref 17–32)
CREAT SERPL-MCNC: 1 MG/DL — SIGNIFICANT CHANGE UP (ref 0.7–1.5)
EGFR: 57 ML/MIN/1.73M2 — LOW
EOSINOPHIL # BLD AUTO: 0.29 K/UL — SIGNIFICANT CHANGE UP (ref 0–0.7)
EOSINOPHIL NFR BLD AUTO: 2.5 % — SIGNIFICANT CHANGE UP (ref 0–8)
GLUCOSE BLDC GLUCOMTR-MCNC: 191 MG/DL — HIGH (ref 70–99)
GLUCOSE BLDC GLUCOMTR-MCNC: 79 MG/DL — SIGNIFICANT CHANGE UP (ref 70–99)
GLUCOSE SERPL-MCNC: 77 MG/DL — SIGNIFICANT CHANGE UP (ref 70–99)
HCT VFR BLD CALC: 32.7 % — LOW (ref 37–47)
HGB BLD-MCNC: 9.5 G/DL — LOW (ref 12–16)
IMM GRANULOCYTES NFR BLD AUTO: 0.3 % — SIGNIFICANT CHANGE UP (ref 0.1–0.3)
LYMPHOCYTES # BLD AUTO: 1.96 K/UL — SIGNIFICANT CHANGE UP (ref 1.2–3.4)
LYMPHOCYTES # BLD AUTO: 16.6 % — LOW (ref 20.5–51.1)
MAGNESIUM SERPL-MCNC: 2.1 MG/DL — SIGNIFICANT CHANGE UP (ref 1.8–2.4)
MCHC RBC-ENTMCNC: 23.3 PG — LOW (ref 27–31)
MCHC RBC-ENTMCNC: 29.1 G/DL — LOW (ref 32–37)
MCV RBC AUTO: 80.1 FL — LOW (ref 81–99)
MONOCYTES # BLD AUTO: 1 K/UL — HIGH (ref 0.1–0.6)
MONOCYTES NFR BLD AUTO: 8.5 % — SIGNIFICANT CHANGE UP (ref 1.7–9.3)
NEUTROPHILS # BLD AUTO: 8.47 K/UL — HIGH (ref 1.4–6.5)
NEUTROPHILS NFR BLD AUTO: 71.9 % — SIGNIFICANT CHANGE UP (ref 42.2–75.2)
NRBC # BLD: 0 /100 WBCS — SIGNIFICANT CHANGE UP (ref 0–0)
PLATELET # BLD AUTO: 365 K/UL — SIGNIFICANT CHANGE UP (ref 130–400)
POTASSIUM SERPL-MCNC: 3.7 MMOL/L — SIGNIFICANT CHANGE UP (ref 3.5–5)
POTASSIUM SERPL-SCNC: 3.7 MMOL/L — SIGNIFICANT CHANGE UP (ref 3.5–5)
PROT SERPL-MCNC: 5.6 G/DL — LOW (ref 6–8)
RBC # BLD: 4.08 M/UL — LOW (ref 4.2–5.4)
RBC # FLD: 19 % — HIGH (ref 11.5–14.5)
SODIUM SERPL-SCNC: 139 MMOL/L — SIGNIFICANT CHANGE UP (ref 135–146)
WBC # BLD: 11.78 K/UL — HIGH (ref 4.8–10.8)
WBC # FLD AUTO: 11.78 K/UL — HIGH (ref 4.8–10.8)

## 2023-02-01 PROCEDURE — 99239 HOSP IP/OBS DSCHRG MGMT >30: CPT

## 2023-02-01 RX ORDER — SODIUM CHLORIDE 9 MG/ML
250 INJECTION, SOLUTION INTRAVENOUS ONCE
Refills: 0 | Status: DISCONTINUED | OUTPATIENT
Start: 2023-02-01 | End: 2023-02-01

## 2023-02-01 RX ORDER — LOSARTAN POTASSIUM 100 MG/1
1 TABLET, FILM COATED ORAL
Qty: 0 | Refills: 0 | DISCHARGE

## 2023-02-01 RX ORDER — SODIUM CHLORIDE 9 MG/ML
500 INJECTION, SOLUTION INTRAVENOUS ONCE
Refills: 0 | Status: COMPLETED | OUTPATIENT
Start: 2023-02-01 | End: 2023-02-01

## 2023-02-01 RX ORDER — SODIUM CHLORIDE 9 MG/ML
500 INJECTION INTRAMUSCULAR; INTRAVENOUS; SUBCUTANEOUS ONCE
Refills: 0 | Status: COMPLETED | OUTPATIENT
Start: 2023-02-01 | End: 2023-02-01

## 2023-02-01 RX ADMIN — Medication 1 MILLIGRAM(S): at 09:15

## 2023-02-01 RX ADMIN — Medication 1200 MILLIGRAM(S): at 17:22

## 2023-02-01 RX ADMIN — Medication 3 MILLILITER(S): at 08:30

## 2023-02-01 RX ADMIN — Medication 5 MILLIGRAM(S): at 09:16

## 2023-02-01 RX ADMIN — GABAPENTIN 600 MILLIGRAM(S): 400 CAPSULE ORAL at 17:22

## 2023-02-01 RX ADMIN — Medication 1200 MILLIGRAM(S): at 09:13

## 2023-02-01 RX ADMIN — GABAPENTIN 600 MILLIGRAM(S): 400 CAPSULE ORAL at 09:16

## 2023-02-01 RX ADMIN — LOSARTAN POTASSIUM 25 MILLIGRAM(S): 100 TABLET, FILM COATED ORAL at 09:17

## 2023-02-01 RX ADMIN — Medication 3 MILLILITER(S): at 16:55

## 2023-02-01 RX ADMIN — CLOPIDOGREL BISULFATE 75 MILLIGRAM(S): 75 TABLET, FILM COATED ORAL at 11:37

## 2023-02-01 RX ADMIN — Medication 81 MILLIGRAM(S): at 11:37

## 2023-02-01 RX ADMIN — PANTOPRAZOLE SODIUM 40 MILLIGRAM(S): 20 TABLET, DELAYED RELEASE ORAL at 09:15

## 2023-02-01 RX ADMIN — SERTRALINE 100 MILLIGRAM(S): 25 TABLET, FILM COATED ORAL at 11:37

## 2023-02-01 RX ADMIN — Medication 2: at 11:38

## 2023-02-01 RX ADMIN — Medication 40 MILLIGRAM(S): at 09:14

## 2023-02-01 RX ADMIN — ENOXAPARIN SODIUM 40 MILLIGRAM(S): 100 INJECTION SUBCUTANEOUS at 11:38

## 2023-02-01 RX ADMIN — Medication 1 MILLIGRAM(S): at 17:23

## 2023-02-01 RX ADMIN — SODIUM CHLORIDE 500 MILLILITER(S): 9 INJECTION INTRAMUSCULAR; INTRAVENOUS; SUBCUTANEOUS at 15:42

## 2023-02-02 LAB — GLUCOSE BLDC GLUCOMTR-MCNC: 86 MG/DL — SIGNIFICANT CHANGE UP (ref 70–99)

## 2023-02-04 ENCOUNTER — INPATIENT (INPATIENT)
Facility: HOSPITAL | Age: 83
LOS: 4 days | Discharge: HOME | DRG: 196 | End: 2023-02-09
Attending: HOSPITALIST | Admitting: HOSPITALIST
Payer: MEDICARE

## 2023-02-04 VITALS
TEMPERATURE: 100 F | SYSTOLIC BLOOD PRESSURE: 95 MMHG | DIASTOLIC BLOOD PRESSURE: 50 MMHG | RESPIRATION RATE: 22 BRPM | WEIGHT: 149.91 LBS | HEART RATE: 70 BPM | OXYGEN SATURATION: 98 %

## 2023-02-04 PROBLEM — J44.9 CHRONIC OBSTRUCTIVE PULMONARY DISEASE, UNSPECIFIED: Chronic | Status: ACTIVE | Noted: 2023-01-23

## 2023-02-04 LAB
ALBUMIN SERPL ELPH-MCNC: 3.3 G/DL — LOW (ref 3.5–5.2)
ALP SERPL-CCNC: 62 U/L — SIGNIFICANT CHANGE UP (ref 30–115)
ALT FLD-CCNC: 38 U/L — SIGNIFICANT CHANGE UP (ref 0–41)
ANION GAP SERPL CALC-SCNC: 8 MMOL/L — SIGNIFICANT CHANGE UP (ref 7–14)
APPEARANCE UR: CLEAR — SIGNIFICANT CHANGE UP
APTT BLD: 27.1 SEC — SIGNIFICANT CHANGE UP (ref 27–39.2)
AST SERPL-CCNC: 24 U/L — SIGNIFICANT CHANGE UP (ref 0–41)
BASE EXCESS BLDV CALC-SCNC: 5.8 MMOL/L — HIGH (ref -2–3)
BASOPHILS # BLD AUTO: 0.01 K/UL — SIGNIFICANT CHANGE UP (ref 0–0.2)
BASOPHILS NFR BLD AUTO: 0.1 % — SIGNIFICANT CHANGE UP (ref 0–1)
BILIRUB SERPL-MCNC: 0.2 MG/DL — SIGNIFICANT CHANGE UP (ref 0.2–1.2)
BILIRUB UR-MCNC: NEGATIVE — SIGNIFICANT CHANGE UP
BUN SERPL-MCNC: 22 MG/DL — HIGH (ref 10–20)
CA-I SERPL-SCNC: 1.25 MMOL/L — SIGNIFICANT CHANGE UP (ref 1.15–1.33)
CALCIUM SERPL-MCNC: 8.9 MG/DL — SIGNIFICANT CHANGE UP (ref 8.4–10.5)
CHLORIDE SERPL-SCNC: 100 MMOL/L — SIGNIFICANT CHANGE UP (ref 98–110)
CO2 SERPL-SCNC: 27 MMOL/L — SIGNIFICANT CHANGE UP (ref 17–32)
COLOR SPEC: SIGNIFICANT CHANGE UP
CREAT SERPL-MCNC: 0.9 MG/DL — SIGNIFICANT CHANGE UP (ref 0.7–1.5)
DIFF PNL FLD: NEGATIVE — SIGNIFICANT CHANGE UP
EGFR: 64 ML/MIN/1.73M2 — SIGNIFICANT CHANGE UP
EOSINOPHIL # BLD AUTO: 0.19 K/UL — SIGNIFICANT CHANGE UP (ref 0–0.7)
EOSINOPHIL NFR BLD AUTO: 2 % — SIGNIFICANT CHANGE UP (ref 0–8)
FLUAV AG NPH QL: SIGNIFICANT CHANGE UP
FLUBV AG NPH QL: SIGNIFICANT CHANGE UP
GAS PNL BLDV: 134 MMOL/L — LOW (ref 136–145)
GAS PNL BLDV: SIGNIFICANT CHANGE UP
GLUCOSE SERPL-MCNC: 92 MG/DL — SIGNIFICANT CHANGE UP (ref 70–99)
GLUCOSE UR QL: NEGATIVE — SIGNIFICANT CHANGE UP
HCO3 BLDV-SCNC: 32 MMOL/L — HIGH (ref 22–29)
HCT VFR BLD CALC: 29.4 % — LOW (ref 37–47)
HCT VFR BLDA CALC: 23 % — LOW (ref 39–51)
HGB BLD CALC-MCNC: 7.6 G/DL — LOW (ref 12.6–17.4)
HGB BLD-MCNC: 8.4 G/DL — LOW (ref 12–16)
IMM GRANULOCYTES NFR BLD AUTO: 0.8 % — HIGH (ref 0.1–0.3)
INR BLD: 1.27 RATIO — SIGNIFICANT CHANGE UP (ref 0.65–1.3)
KETONES UR-MCNC: NEGATIVE — SIGNIFICANT CHANGE UP
LACTATE BLDV-MCNC: 1.3 MMOL/L — SIGNIFICANT CHANGE UP (ref 0.5–2)
LEUKOCYTE ESTERASE UR-ACNC: NEGATIVE — SIGNIFICANT CHANGE UP
LYMPHOCYTES # BLD AUTO: 0.83 K/UL — LOW (ref 1.2–3.4)
LYMPHOCYTES # BLD AUTO: 9 % — LOW (ref 20.5–51.1)
MCHC RBC-ENTMCNC: 23.5 PG — LOW (ref 27–31)
MCHC RBC-ENTMCNC: 28.6 G/DL — LOW (ref 32–37)
MCV RBC AUTO: 82.4 FL — SIGNIFICANT CHANGE UP (ref 81–99)
MONOCYTES # BLD AUTO: 0.86 K/UL — HIGH (ref 0.1–0.6)
MONOCYTES NFR BLD AUTO: 9.3 % — SIGNIFICANT CHANGE UP (ref 1.7–9.3)
NEUTROPHILS # BLD AUTO: 7.31 K/UL — HIGH (ref 1.4–6.5)
NEUTROPHILS NFR BLD AUTO: 78.8 % — HIGH (ref 42.2–75.2)
NITRITE UR-MCNC: NEGATIVE — SIGNIFICANT CHANGE UP
NRBC # BLD: 0 /100 WBCS — SIGNIFICANT CHANGE UP (ref 0–0)
NT-PROBNP SERPL-SCNC: 2288 PG/ML — HIGH (ref 0–300)
PCO2 BLDV: 52 MMHG — HIGH (ref 39–42)
PH BLDV: 7.39 — SIGNIFICANT CHANGE UP (ref 7.32–7.43)
PH UR: 5.5 — SIGNIFICANT CHANGE UP (ref 5–8)
PLATELET # BLD AUTO: 239 K/UL — SIGNIFICANT CHANGE UP (ref 130–400)
PO2 BLDV: 31 MMHG — SIGNIFICANT CHANGE UP
POTASSIUM BLDV-SCNC: 3.4 MMOL/L — LOW (ref 3.5–5.1)
POTASSIUM SERPL-MCNC: 3.5 MMOL/L — SIGNIFICANT CHANGE UP (ref 3.5–5)
POTASSIUM SERPL-SCNC: 3.5 MMOL/L — SIGNIFICANT CHANGE UP (ref 3.5–5)
PROT SERPL-MCNC: 5.3 G/DL — LOW (ref 6–8)
PROT UR-MCNC: SIGNIFICANT CHANGE UP
PROTHROM AB SERPL-ACNC: 14.6 SEC — HIGH (ref 9.95–12.87)
RBC # BLD: 3.57 M/UL — LOW (ref 4.2–5.4)
RBC # FLD: 19.1 % — HIGH (ref 11.5–14.5)
RSV RNA NPH QL NAA+NON-PROBE: SIGNIFICANT CHANGE UP
SAO2 % BLDV: 57.7 % — SIGNIFICANT CHANGE UP
SARS-COV-2 RNA SPEC QL NAA+PROBE: SIGNIFICANT CHANGE UP
SODIUM SERPL-SCNC: 135 MMOL/L — SIGNIFICANT CHANGE UP (ref 135–146)
SP GR SPEC: 1.02 — SIGNIFICANT CHANGE UP (ref 1.01–1.03)
TROPONIN T SERPL-MCNC: <0.01 NG/ML — SIGNIFICANT CHANGE UP
UROBILINOGEN FLD QL: SIGNIFICANT CHANGE UP
WBC # BLD: 9.27 K/UL — SIGNIFICANT CHANGE UP (ref 4.8–10.8)
WBC # FLD AUTO: 9.27 K/UL — SIGNIFICANT CHANGE UP (ref 4.8–10.8)

## 2023-02-04 PROCEDURE — 84295 ASSAY OF SERUM SODIUM: CPT

## 2023-02-04 PROCEDURE — 80053 COMPREHEN METABOLIC PANEL: CPT

## 2023-02-04 PROCEDURE — 85610 PROTHROMBIN TIME: CPT

## 2023-02-04 PROCEDURE — 36415 COLL VENOUS BLD VENIPUNCTURE: CPT

## 2023-02-04 PROCEDURE — 83605 ASSAY OF LACTIC ACID: CPT

## 2023-02-04 PROCEDURE — 94640 AIRWAY INHALATION TREATMENT: CPT

## 2023-02-04 PROCEDURE — 99497 ADVNCD CARE PLAN 30 MIN: CPT | Mod: 25

## 2023-02-04 PROCEDURE — 85025 COMPLETE CBC W/AUTO DIFF WBC: CPT

## 2023-02-04 PROCEDURE — 84145 PROCALCITONIN (PCT): CPT

## 2023-02-04 PROCEDURE — 85014 HEMATOCRIT: CPT

## 2023-02-04 PROCEDURE — 96374 THER/PROPH/DIAG INJ IV PUSH: CPT

## 2023-02-04 PROCEDURE — 93010 ELECTROCARDIOGRAM REPORT: CPT

## 2023-02-04 PROCEDURE — 87040 BLOOD CULTURE FOR BACTERIA: CPT

## 2023-02-04 PROCEDURE — 87086 URINE CULTURE/COLONY COUNT: CPT

## 2023-02-04 PROCEDURE — 83880 ASSAY OF NATRIURETIC PEPTIDE: CPT

## 2023-02-04 PROCEDURE — 82803 BLOOD GASES ANY COMBINATION: CPT

## 2023-02-04 PROCEDURE — 71045 X-RAY EXAM CHEST 1 VIEW: CPT

## 2023-02-04 PROCEDURE — 99285 EMERGENCY DEPT VISIT HI MDM: CPT

## 2023-02-04 PROCEDURE — 83735 ASSAY OF MAGNESIUM: CPT

## 2023-02-04 PROCEDURE — 85730 THROMBOPLASTIN TIME PARTIAL: CPT

## 2023-02-04 PROCEDURE — 93005 ELECTROCARDIOGRAM TRACING: CPT

## 2023-02-04 PROCEDURE — 82962 GLUCOSE BLOOD TEST: CPT

## 2023-02-04 PROCEDURE — 99223 1ST HOSP IP/OBS HIGH 75: CPT

## 2023-02-04 PROCEDURE — 85018 HEMOGLOBIN: CPT

## 2023-02-04 PROCEDURE — 71045 X-RAY EXAM CHEST 1 VIEW: CPT | Mod: 26

## 2023-02-04 PROCEDURE — 97162 PT EVAL MOD COMPLEX 30 MIN: CPT | Mod: GP

## 2023-02-04 PROCEDURE — 0241U: CPT

## 2023-02-04 PROCEDURE — 84132 ASSAY OF SERUM POTASSIUM: CPT

## 2023-02-04 PROCEDURE — 84484 ASSAY OF TROPONIN QUANT: CPT

## 2023-02-04 PROCEDURE — 82330 ASSAY OF CALCIUM: CPT

## 2023-02-04 PROCEDURE — 81003 URINALYSIS AUTO W/O SCOPE: CPT

## 2023-02-04 RX ORDER — ALBUTEROL 90 UG/1
2 AEROSOL, METERED ORAL
Qty: 0 | Refills: 0 | DISCHARGE

## 2023-02-04 RX ORDER — PANTOPRAZOLE SODIUM 20 MG/1
40 TABLET, DELAYED RELEASE ORAL
Refills: 0 | Status: DISCONTINUED | OUTPATIENT
Start: 2023-02-04 | End: 2023-02-09

## 2023-02-04 RX ORDER — PANTOPRAZOLE SODIUM 20 MG/1
1 TABLET, DELAYED RELEASE ORAL
Qty: 0 | Refills: 0 | DISCHARGE

## 2023-02-04 RX ORDER — CARVEDILOL PHOSPHATE 80 MG/1
0.5 CAPSULE, EXTENDED RELEASE ORAL
Qty: 0 | Refills: 0 | DISCHARGE

## 2023-02-04 RX ORDER — LANOLIN ALCOHOL/MO/W.PET/CERES
3 CREAM (GRAM) TOPICAL AT BEDTIME
Refills: 0 | Status: DISCONTINUED | OUTPATIENT
Start: 2023-02-04 | End: 2023-02-09

## 2023-02-04 RX ORDER — SOLIFENACIN SUCCINATE 10 MG/1
1 TABLET ORAL
Qty: 0 | Refills: 0 | DISCHARGE

## 2023-02-04 RX ORDER — AZITHROMYCIN 500 MG/1
500 TABLET, FILM COATED ORAL ONCE
Refills: 0 | Status: COMPLETED | OUTPATIENT
Start: 2023-02-04 | End: 2023-02-04

## 2023-02-04 RX ORDER — RISPERIDONE 4 MG/1
1 TABLET ORAL
Qty: 0 | Refills: 0 | DISCHARGE

## 2023-02-04 RX ORDER — OXYBUTYNIN CHLORIDE 5 MG
5 TABLET ORAL
Refills: 0 | Status: DISCONTINUED | OUTPATIENT
Start: 2023-02-04 | End: 2023-02-09

## 2023-02-04 RX ORDER — LINAGLIPTIN 5 MG/1
1 TABLET, FILM COATED ORAL
Qty: 0 | Refills: 0 | DISCHARGE

## 2023-02-04 RX ORDER — IPRATROPIUM/ALBUTEROL SULFATE 18-103MCG
3 AEROSOL WITH ADAPTER (GRAM) INHALATION EVERY 6 HOURS
Refills: 0 | Status: DISCONTINUED | OUTPATIENT
Start: 2023-02-04 | End: 2023-02-07

## 2023-02-04 RX ORDER — GABAPENTIN 400 MG/1
600 CAPSULE ORAL
Refills: 0 | Status: DISCONTINUED | OUTPATIENT
Start: 2023-02-04 | End: 2023-02-09

## 2023-02-04 RX ORDER — SERTRALINE 25 MG/1
100 TABLET, FILM COATED ORAL DAILY
Refills: 0 | Status: DISCONTINUED | OUTPATIENT
Start: 2023-02-04 | End: 2023-02-09

## 2023-02-04 RX ORDER — AZITHROMYCIN 500 MG/1
500 TABLET, FILM COATED ORAL EVERY 24 HOURS
Refills: 0 | Status: DISCONTINUED | OUTPATIENT
Start: 2023-02-05 | End: 2023-02-07

## 2023-02-04 RX ORDER — CLOPIDOGREL BISULFATE 75 MG/1
75 TABLET, FILM COATED ORAL DAILY
Refills: 0 | Status: DISCONTINUED | OUTPATIENT
Start: 2023-02-05 | End: 2023-02-09

## 2023-02-04 RX ORDER — ATORVASTATIN CALCIUM 80 MG/1
40 TABLET, FILM COATED ORAL AT BEDTIME
Refills: 0 | Status: DISCONTINUED | OUTPATIENT
Start: 2023-02-04 | End: 2023-02-09

## 2023-02-04 RX ORDER — ACETAMINOPHEN 500 MG
650 TABLET ORAL EVERY 6 HOURS
Refills: 0 | Status: DISCONTINUED | OUTPATIENT
Start: 2023-02-04 | End: 2023-02-09

## 2023-02-04 RX ORDER — FLUTICASONE FUROATE, UMECLIDINIUM BROMIDE AND VILANTEROL TRIFENATATE 200; 62.5; 25 UG/1; UG/1; UG/1
1 POWDER RESPIRATORY (INHALATION)
Qty: 0 | Refills: 0 | DISCHARGE

## 2023-02-04 RX ORDER — FUROSEMIDE 40 MG
40 TABLET ORAL ONCE
Refills: 0 | Status: COMPLETED | OUTPATIENT
Start: 2023-02-04 | End: 2023-02-04

## 2023-02-04 RX ORDER — ENOXAPARIN SODIUM 100 MG/ML
40 INJECTION SUBCUTANEOUS EVERY 24 HOURS
Refills: 0 | Status: DISCONTINUED | OUTPATIENT
Start: 2023-02-04 | End: 2023-02-09

## 2023-02-04 RX ORDER — CEFTRIAXONE 500 MG/1
1000 INJECTION, POWDER, FOR SOLUTION INTRAMUSCULAR; INTRAVENOUS EVERY 24 HOURS
Refills: 0 | Status: DISCONTINUED | OUTPATIENT
Start: 2023-02-04 | End: 2023-02-06

## 2023-02-04 RX ORDER — SERTRALINE 25 MG/1
1 TABLET, FILM COATED ORAL
Qty: 0 | Refills: 0 | DISCHARGE

## 2023-02-04 RX ORDER — CARVEDILOL PHOSPHATE 80 MG/1
12.5 CAPSULE, EXTENDED RELEASE ORAL EVERY 12 HOURS
Refills: 0 | Status: DISCONTINUED | OUTPATIENT
Start: 2023-02-04 | End: 2023-02-09

## 2023-02-04 RX ORDER — ALPRAZOLAM 0.25 MG
1 TABLET ORAL
Refills: 0 | Status: DISCONTINUED | OUTPATIENT
Start: 2023-02-04 | End: 2023-02-09

## 2023-02-04 RX ORDER — GABAPENTIN 400 MG/1
1 CAPSULE ORAL
Qty: 0 | Refills: 0 | DISCHARGE

## 2023-02-04 RX ORDER — ASPIRIN/CALCIUM CARB/MAGNESIUM 324 MG
81 TABLET ORAL DAILY
Refills: 0 | Status: DISCONTINUED | OUTPATIENT
Start: 2023-02-04 | End: 2023-02-09

## 2023-02-04 RX ORDER — SODIUM CHLORIDE 9 MG/ML
2100 INJECTION, SOLUTION INTRAVENOUS ONCE
Refills: 0 | Status: COMPLETED | OUTPATIENT
Start: 2023-02-04 | End: 2023-02-04

## 2023-02-04 RX ORDER — ONDANSETRON 8 MG/1
4 TABLET, FILM COATED ORAL EVERY 8 HOURS
Refills: 0 | Status: DISCONTINUED | OUTPATIENT
Start: 2023-02-04 | End: 2023-02-09

## 2023-02-04 RX ORDER — IPRATROPIUM/ALBUTEROL SULFATE 18-103MCG
3 AEROSOL WITH ADAPTER (GRAM) INHALATION ONCE
Refills: 0 | Status: COMPLETED | OUTPATIENT
Start: 2023-02-04 | End: 2023-02-04

## 2023-02-04 RX ORDER — TIOTROPIUM BROMIDE 18 UG/1
2 CAPSULE ORAL; RESPIRATORY (INHALATION) DAILY
Refills: 0 | Status: COMPLETED | OUTPATIENT
Start: 2023-02-04 | End: 2024-01-03

## 2023-02-04 RX ORDER — RISPERIDONE 4 MG/1
1 TABLET ORAL AT BEDTIME
Refills: 0 | Status: DISCONTINUED | OUTPATIENT
Start: 2023-02-04 | End: 2023-02-09

## 2023-02-04 RX ORDER — ALPRAZOLAM 0.25 MG
1 TABLET ORAL
Qty: 0 | Refills: 0 | DISCHARGE

## 2023-02-04 RX ORDER — ASPIRIN/CALCIUM CARB/MAGNESIUM 324 MG
1 TABLET ORAL
Qty: 0 | Refills: 3 | DISCHARGE

## 2023-02-04 RX ORDER — BUDESONIDE AND FORMOTEROL FUMARATE DIHYDRATE 160; 4.5 UG/1; UG/1
2 AEROSOL RESPIRATORY (INHALATION)
Refills: 0 | Status: COMPLETED | OUTPATIENT
Start: 2023-02-04 | End: 2024-01-03

## 2023-02-04 RX ADMIN — Medication 40 MILLIGRAM(S): at 23:14

## 2023-02-04 RX ADMIN — Medication 3 MILLILITER(S): at 21:49

## 2023-02-04 RX ADMIN — AZITHROMYCIN 255 MILLIGRAM(S): 500 TABLET, FILM COATED ORAL at 21:49

## 2023-02-04 RX ADMIN — SODIUM CHLORIDE 2100 MILLILITER(S): 9 INJECTION, SOLUTION INTRAVENOUS at 16:47

## 2023-02-04 RX ADMIN — Medication 3 MILLILITER(S): at 16:47

## 2023-02-04 RX ADMIN — Medication 125 MILLIGRAM(S): at 16:47

## 2023-02-04 NOTE — H&P ADULT - HISTORY OF PRESENT ILLNESS
81 yo female Pmhx Copd (sent on 2L NC last admission), Cad with stent x 1 2019, Dm, Htn, HFrEF (40-45%) presenting for       VITALS:   T(C): 36.6 (02-04-23 @ 19:27), Max: 37.7 (02-04-23 @ 15:07)  HR: 77 (02-04-23 @ 19:27) (70 - 77)  BP: 118/56 (02-04-23 @ 19:27) (95/50 - 118/56)  RR: 20 (02-04-23 @ 19:27) (20 - 22)  SpO2: 98% (02-04-23 @ 19:27) (98% - 98%)    in ED, vitals were WNLs. labs significant for proBNP 2K. patient received 2.1L LR bolus, solumedrol 125 mg IV, azithromycin and admitted to medicine for further management 81 yo female Pmhx ILD, Copd (sent on 2L NC last admission), Cad with stent x 1 2019, Dm, Htn, HFrEF (40-45%) presenting for SOB and dysuria. history goes back to 3 days ago when patient started complaining for SOB at rest associated with increased cough and increased production of whitish sputum. she also reported low grade fever (around 100 F), rhinorrhea, sinus congestion occasional headache, and diarrhea (around 3 episodes/day, watery, non bloody, does not remember when it started but started taking imodium due to the severity of it). patient also complaining of suprapubic pain associated with dysuria -> she took a urine test and reported having a UTI but did not start any antibiotics. she reported using many pillows at night to sleep but no chest pain, palpitation, nausea or vomiting.      VITALS:   T(C): 36.6 (02-04-23 @ 19:27), Max: 37.7 (02-04-23 @ 15:07)  HR: 77 (02-04-23 @ 19:27) (70 - 77)  BP: 118/56 (02-04-23 @ 19:27) (95/50 - 118/56)  RR: 20 (02-04-23 @ 19:27) (20 - 22)  SpO2: 98% (02-04-23 @ 19:27) (98% - 98%)    in ED, vitals were WNLs. labs significant for proBNP 2K. patient received 2.1L LR bolus, solumedrol 125 mg IV, azithromycin and admitted to medicine for further management

## 2023-02-04 NOTE — H&P ADULT - ASSESSMENT
81 yo female Pmhx Copd (sent on 2L NC last admission), Cad with stent x 1 2019, Dm, Htn, HFrEF (40-45%) presenting for         #anemia  - Hb 8.4 at baseline  - on IV iron supplementation?      #HFrEF  #CAD- s/p stent 2019  #HTN  - TTE (01/2023) ->EF 40-45%, G1DD,  Entire apex and basal and mid inferolateral wall are abnormal.  - during last admission cardio recommended OP nuclear stress test  - c/w carvedilol, losartan  - c/w DAPT   - follows with Dr Leyva    #DM  - on trajenta at home  - monitor FS    #HLD  - c/w atorvastatin    #MISC  DVT PPX Lovenox  GI PPX Protonix  DIET DASH/TLC/CC  CODE DNR/DNI     81 yo female Pmhx ILD, Copd (sent on 2L NC last admission), Cad with stent x 1 2019, Dm, Htn, HFrEF (40-45%) presenting for SOB, dysuria.    #COPD exacerbation  #acute hypoxic and hypercapnic respiratory failure  #H/O ILD  - SOB since 1 week prior to presentation associated with productive cough  - no fever or leukocytosis  - s/p azithromycin in ED   - s/p solumedrol 125 mg iv in ED   - currently spo2 98% on 3lNC  - bilateral coarse crackles and mild wheezes on auscultation  - CXR -> Stable diffuse interstitial opacities. Blunted left costophrenic angle. No pneumothorax  - c/w ceftriaxone/azithromycin  - c/w solumedrol 40 TID  - urine strep and legionella  - MRSA swab  - RSV-COVID-FLU negative  - c/w duoneb  - fu procal  - f/u blood culture and sputum culture  - pulm eval    #suspected cystitis  - reportedly patient's urine was positive  - complains of suprapubic tenderness and dysuria  - here UA negative  but f/u microscopic analysis  - fu urine culture      #HFrEF  #CAD- s/p stent 2019  #HTN  - TTE (01/2023) ->EF 40-45%, G1DD,  Entire apex and basal and mid inferolateral wall are abnormal.  - during last admission cardio recommended OP nuclear stress test  - c/w carvedilol, losartan  - c/w DAPT   - follows with Dr Leyva    #anemia  - Hb 8.4 at baseline  - tales IV iron occasionally -> follows with Dr hernandez    #DM  - on trajenta at home  - monitor FS    #HLD  - c/w atorvastatin    #MISC  DVT PPX Lovenox  GI PPX Protonix  DIET DASH/TLC/CC  CODE DNR/DNI     83 yo female Pmhx ILD, Copd (sent on 2L NC last admission), Cad with stent x 1 2019, Dm, Htn, HFrEF (40-45%) presenting for SOB, dysuria.    #COPD exacerbation  #acute hypoxic and hypercapnic respiratory failure  #H/O ILD  - SOB since 1 week prior to presentation associated with productive cough  - no fever or leukocytosis  - s/p azithromycin in ED   - s/p solumedrol 125 mg iv in ED   - currently spo2 98% on 3lNC  - bilateral coarse crackles and mild wheezes on auscultation  - CXR -> Stable diffuse interstitial opacities. Blunted left costophrenic angle. No pneumothorax  - c/w ceftriaxone/azithromycin  - c/w solumedrol 40 TID  - urine strep and legionella  - MRSA swab  - RSV-COVID-FLU negative  - c/w duoneb  - fu procal  - f/u blood culture and sputum culture  - pulm eval    #suspected cystitis  - reportedly patient's urine was positive  - complains of suprapubic tenderness and dysuria  - here UA negative  but f/u microscopic analysis  - fu urine culture      #HFrEF  #CAD- s/p stent 2019  #HTN  - TTE (01/2023) ->EF 40-45%, G1DD,  Entire apex and basal and mid inferolateral wall are abnormal.  - during last admission cardio recommended OP nuclear stress test due to new changes on the TTE  - proBNP 2288, trop<0.01  - bilateral crackles on examination that could be due to her ILD but with complaining of using many pillows while sleeping will give Lasix 40 IV once -> fu in AM  - c/w carvedilol, losartan  - c/w DAPT   - follows with Dr Leyva    #anemia  - Hb 8.4 at baseline  - tales IV iron occasionally -> follows with Dr hernandez    #DM  - on trajenta at home  - monitor FS    #HLD  - c/w atorvastatin    #MISC  DVT PPX Lovenox  GI PPX Protonix  DIET DASH/TLC/CC  CODE DNR/DNI     81 yo female Pmhx ILD, Copd (sent on 2L NC last admission), Cad with stent x 1 2019, Dm, Htn, HFrEF (40-45%) presenting for SOB, dysuria.    #COPD exacerbation  #acute hypoxic and hypercapnic respiratory failure  #H/O ILD  - SOB since 1 week prior to presentation associated with productive cough  - no fever or leukocytosis  - s/p azithromycin in ED   - s/p solumedrol 125 mg iv in ED   - currently spo2 98% on 3lNC  - bilateral coarse crackles and mild wheezes on auscultation  - CXR -> Stable diffuse interstitial opacities. Blunted left costophrenic angle. No pneumothorax  - c/w ceftriaxone/azithromycin  - c/w solumedrol 40 TID  - urine strep and legionella  - MRSA swab  - RSV-COVID-FLU negative  - c/w duoneb  - fu procal  - f/u blood culture and sputum culture  - pulm eval    #suspected cystitis  - reportedly patient's urine was positive  - complains of suprapubic tenderness and dysuria  - here UA negative  but f/u microscopic analysis  - fu urine culture      #HFrEF  #CAD- s/p stent 2019  #HTN  - TTE (01/2023) ->EF 40-45%, G1DD,  Entire apex and basal and mid inferolateral wall are abnormal.  - during last admission cardio recommended OP nuclear stress test due to new changes on the TTE  - proBNP 2288, trop<0.01  - bilateral crackles on examination that could be due to her ILD but with complaining of using many pillows while sleeping will give Lasix 40 IV once -> fu in AM  - c/w carvedilol, losartan  - c/w DAPT   - follows with Dr Leyva    #anemia  - Hb 8.4 at baseline  - tales IV iron occasionally -> follows with Dr hernandez    #DM  - on trajenta at home  - monitor FS    #HLD  - c/w atorvastatin    #MISC  DVT PPX Lovenox  GI PPX Protonix  DIET DASH/TLC/CC  CODE DNR/DNI - please get MOLST from medical records in AM     83 yo female Pmhx ILD, Copd (sent on 2L NC last admission), Cad with stent x 1 2019, Dm, Htn, HFrEF (40-45%) presenting for SOB, dysuria.    #COPD exacerbation  #acute hypoxic and hypercapnic respiratory failure  #H/O ILD  - SOB since 1 week prior to presentation associated with productive cough  - no fever or leukocytosis  - s/p azithromycin in ED   - s/p solumedrol 125 mg iv in ED   - currently spo2 98% on 3lNC  - bilateral coarse crackles and mild wheezes on auscultation  - CXR -> Stable diffuse interstitial opacities. Blunted left costophrenic angle. No pneumothorax  - c/w ceftriaxone/azithromycin  - c/w solumedrol 40 TID  - RSV-COVID-FLU negative  - c/w duoneb  - fu procal  - f/u blood culture  - pulm eval    #suspected cystitis  - reportedly patient's urine was positive  - complains of suprapubic tenderness and dysuria  - here UA negative  but f/u microscopic analysis  - fu urine culture      #HFrEF  #CAD- s/p stent 2019  #HTN  - TTE (01/2023) ->EF 40-45%, G1DD,  Entire apex and basal and mid inferolateral wall are abnormal.  - during last admission cardio recommended OP nuclear stress test due to new changes on the TTE  - proBNP 2288, trop<0.01  - bilateral crackles on examination that could be due to her ILD but with complaining of using many pillows while sleeping will give Lasix 40 IV once -> fu in AM  - c/w carvedilol, losartan  - c/w DAPT   - follows with Dr Leyva    #anemia  - Hb 8.4 at baseline  - tales IV iron occasionally -> follows with Dr hernandez    #DM  - on trajenta at home  - monitor FS    #HLD  - c/w atorvastatin    #MISC  DVT PPX Lovenox  GI PPX Protonix  DIET DASH/TLC/CC  CODE DNR/DNI - please get MOLST from medical records in AM

## 2023-02-04 NOTE — H&P ADULT - ATTENDING COMMENTS
81 YO F w/ a PMH of ILD/Copd (2L home O2), CAD s/p stent, DM2, HTN, and HFrEF who was BIBEMS for eval of progressively worsening dyspnea for the past x 3 days. Associated w/ productive cough (white phlegm), rhinorrhea, and fevers. Denies any CP, palpitations, or LE swelling. ROS is positive for dysuria, otherwise negative except as above.     In the ED, Chest X-Ray showed stable diffuse interstitial opacities. Pt was started on IV ABXs (Azithro), IVFs (LR), IV steroids, Duonebs, and IV Lasix in the ED.     FMHx:   -No family Hx of early cardiac death, CAD, asthma, or genetic disorders identified    Physical exam shows pt in NAD. VSS, afebrile, not hypoxic on 2LNC. A&Ox3. Neuro exam without deficits, motor/sensory intact, no dysarthria, no facial asymmetry. Muscle strength/sensation intact. Bibasilar crackles w/ scattered wheezes throughout. RRR, no M/G/R. ABD is soft and non-tender, normoactive BSs. LEs without swelling. No rashes. Labs and radiology as above.     Dyspnea, multifactorial, COPD exacerbation and acute on chronic HFrEF. Send procalcitonin. Duonebs PRN and standing. Symbicort. IV steroids and transition to PO. Azithro. Claritin. H2B. Anti-tussives PRN. Supplemental O2 PRN. IV Lasix and transition to PO. Monitor daily weights, Is&Os, and diet/fluid restriction. Restart home meds.     Normocytic anemia, slightly below baseline. Pt denies bleeding symptoms. Send anemia work-up. Replace as necessary.     Hypoalbuminemia, from poor oral intake. Nutrition eval.     Hx of CAD s/p stent, DM2, and HTN. Restart home meds, except as stated above. DVT PPX. Inform PCP of pt's admission to hospital. My note supersedes the residents note.     Date seen by [email or web address]

## 2023-02-04 NOTE — ED PROVIDER NOTE - CLINICAL SUMMARY MEDICAL DECISION MAKING FREE TEXT BOX
81yo F history of HTN DM CAD PCI COPD 2LNC with recent admit/dc for COPD exac with PNA presenting for eval of worsening shortness of breath, cough with assocaited dysuria and urinary frequency. +low grade fevers orally. No vomiting, diarrhea. No flank pain. chronically ill appearing, NAD, non toxic. NCAT PERRLA EOMI neck supple non tender normal wob diffuse wheezing throughout rrr abdomen s nt nd no rebound no guarding WWPx4 neuro non focal. labs imaging reviewed. saturating well on NC currently, mildly improved WOB. +UTI. abx given. will admit for further eval.

## 2023-02-04 NOTE — H&P ADULT - NSICDXPASTMEDICALHX_GEN_ALL_CORE_FT
PAST MEDICAL HISTORY:  CAD (coronary artery disease)     Chronic systolic congestive heart failure     COPD (chronic obstructive pulmonary disease)     DM (diabetes mellitus)     HLD (hyperlipidemia)     HTN (hypertension)     Neuropathy

## 2023-02-04 NOTE — ED ADULT NURSE REASSESSMENT NOTE - NS ED NURSE REASSESS COMMENT FT1
Received pt from previous nurse, awaiting for transport for bed on 3E. No c/o pain nor discomfort at this time. Safety measures maintained. Call bell within reach.

## 2023-02-04 NOTE — ED PROVIDER NOTE - OBJECTIVE STATEMENT
82 year old female with a history of COPD, CAD with stent x 1 2019, DM, HTN presents to the ED with SOB, cough, and fever. Patient was discharged from the Hospital 02/01 after admission for respiratory failure 2/2 COPD, fluid overload and pneumonia. Last night patients Son noted that she was increasingly short of breath and now coughing again. Also reported burning with urination and urinary frequency with low grade fever 100F. Patients son tested urine with home test strips which were positive for UTI. Denies chest pain, abdominal pain, flank pain, nausea, vomiting, AMS, fever, diarrhea.

## 2023-02-04 NOTE — ED PROVIDER NOTE - CARE PLAN
1 Principal Discharge DX:	Shortness of breath   Principal Discharge DX:	Shortness of breath  Secondary Diagnosis:	COPD exacerbation  Secondary Diagnosis:	Pneumonia  Secondary Diagnosis:	Urinary tract infection

## 2023-02-04 NOTE — H&P ADULT - CONVERSATION DETAILS
spoke to patient regarding wishes if she deteriorates. I explained to her the meaning of resuscitation and intubation. she mentioned that she does not want to be intubated or resuscitated in case of deterioration.    patient would like to be DNR/DNI spoke to patient regarding wishes if she deteriorates. I explained to her the meaning of resuscitation and intubation. she mentioned that she does not want to be intubated or resuscitated in case of deterioration.    patient would like to be DNR/DNI      Attending attestation:  -Agree with above.

## 2023-02-04 NOTE — H&P ADULT - NSHPLABSRESULTS_GEN_ALL_CORE
LABS:                          8.4    9.27  )-----------( 239      ( 2023 17:10 )             29.4     02-04    135  |  100  |  22<H>  ----------------------------<  92  3.5   |  27  |  0.9    Ca    8.9      2023 17:10    TPro  5.3<L>  /  Alb  3.3<L>  /  TBili  0.2  /  DBili  x   /  AST  24  /  ALT  38  /  AlkPhos  62  02-04    LIVER FUNCTIONS - ( 2023 17:10 )  Alb: 3.3 g/dL / Pro: 5.3 g/dL / ALK PHOS: 62 U/L / ALT: 38 U/L / AST: 24 U/L / GGT: x           PT/INR - ( 2023 17:10 )   PT: 14.60 sec;   INR: 1.27 ratio         PTT - ( 2023 17:10 )  PTT:27.1 sec  Urinalysis Basic - ( 2023 20:10 )    Color: Light Yellow / Appearance: Clear / S.018 / pH: x  Gluc: x / Ketone: Negative  / Bili: Negative / Urobili: <2 mg/dL   Blood: x / Protein: Trace / Nitrite: Negative   Leuk Esterase: Negative / RBC: x / WBC x   Sq Epi: x / Non Sq Epi: x / Bacteria: x

## 2023-02-04 NOTE — H&P ADULT - NSHPPHYSICALEXAM_GEN_ALL_CORE
GENERAL: NAD, lying in bed comfortably  HEAD:  Atraumatic, Normocephalic  EYES: EOMI, PERRLA, conjunctiva and sclera clear  ENT: Moist mucous membranes  NECK: Supple, No JVD  CHEST/LUNG: Clear to auscultation bilaterally; No rales, rhonchi, wheezing, or rubs. Unlabored respirations  HEART: Regular rate and rhythm; No murmurs, rubs, or gallops  ABDOMEN: BSx4; Soft, nontender, nondistended  EXTREMITIES:  2+ Peripheral Pulses, brisk capillary refill. No clubbing, cyanosis, or edema  NERVOUS SYSTEM:  A&Ox3, no focal deficits   SKIN: No rashes or lesions GENERAL: NAD  HEAD:  Atraumatic, Normocephalic  EYES: EOMI, PERRLA, conjunctiva and sclera clear  ENT: Moist mucous membranes  NECK: Supple, No JVD  CHEST/LUNG: bilaterally decreased air entry. coarse crackles, and mild wheezes bilaterally  HEART: Regular rate and rhythm; No murmurs, rubs, or gallops  ABDOMEN: BSx4; Soft, nondistended. mild suprapubic tenderness  EXTREMITIES:  2+ Peripheral Pulses, brisk capillary refill. No clubbing, cyanosis, or edema  NERVOUS SYSTEM:  A&Ox3, no focal deficits

## 2023-02-04 NOTE — ED PROVIDER NOTE - PHYSICAL EXAMINATION
Physical Exam    Constitutional: No acute distress.   Eyes: Conjunctiva pink, Sclera clear, PERRLA, EOMI.  ENT: No sinus tenderness. No nasal discharge. No oropharyngeal erythema, edema, or exudates. Uvula midline.   Cardiovascular: Regular rate, regular rhythm. No noted murmurs rubs or gallops.  Respiratory: unlabored respiratory effort, bl rhonchi and crackles no wheezes.   Gastrointestinal: Normal bowel sounds. soft, non distended, non-tender abdomen.   Musculoskeletal: supple neck, no midline tenderness. No joint or bony deformity.   Integumentary: warm, dry, no rash  Neurologic: awake, alert, cranial nerves II-XII grossly intact, extremities’ motor and sensory functions grossly intact  Psychiatric: appropriate mood, appropriate affect

## 2023-02-05 DIAGNOSIS — F44.5 CONVERSION DISORDER WITH SEIZURES OR CONVULSIONS: ICD-10-CM

## 2023-02-05 LAB
ALBUMIN SERPL ELPH-MCNC: 3.7 G/DL — SIGNIFICANT CHANGE UP (ref 3.5–5.2)
ALP SERPL-CCNC: 74 U/L — SIGNIFICANT CHANGE UP (ref 30–115)
ALT FLD-CCNC: 42 U/L — HIGH (ref 0–41)
ANION GAP SERPL CALC-SCNC: 11 MMOL/L — SIGNIFICANT CHANGE UP (ref 7–14)
AST SERPL-CCNC: 22 U/L — SIGNIFICANT CHANGE UP (ref 0–41)
BASOPHILS # BLD AUTO: 0.01 K/UL — SIGNIFICANT CHANGE UP (ref 0–0.2)
BASOPHILS NFR BLD AUTO: 0.2 % — SIGNIFICANT CHANGE UP (ref 0–1)
BILIRUB SERPL-MCNC: 0.2 MG/DL — SIGNIFICANT CHANGE UP (ref 0.2–1.2)
BUN SERPL-MCNC: 20 MG/DL — SIGNIFICANT CHANGE UP (ref 10–20)
CALCIUM SERPL-MCNC: 9.3 MG/DL — SIGNIFICANT CHANGE UP (ref 8.4–10.5)
CHLORIDE SERPL-SCNC: 102 MMOL/L — SIGNIFICANT CHANGE UP (ref 98–110)
CO2 SERPL-SCNC: 28 MMOL/L — SIGNIFICANT CHANGE UP (ref 17–32)
CREAT SERPL-MCNC: 0.9 MG/DL — SIGNIFICANT CHANGE UP (ref 0.7–1.5)
EGFR: 64 ML/MIN/1.73M2 — SIGNIFICANT CHANGE UP
EOSINOPHIL # BLD AUTO: 0 K/UL — SIGNIFICANT CHANGE UP (ref 0–0.7)
EOSINOPHIL NFR BLD AUTO: 0 % — SIGNIFICANT CHANGE UP (ref 0–8)
GLUCOSE SERPL-MCNC: 166 MG/DL — HIGH (ref 70–99)
HCT VFR BLD CALC: 33.9 % — LOW (ref 37–47)
HGB BLD-MCNC: 9.6 G/DL — LOW (ref 12–16)
IMM GRANULOCYTES NFR BLD AUTO: 1.6 % — HIGH (ref 0.1–0.3)
LYMPHOCYTES # BLD AUTO: 0.46 K/UL — LOW (ref 1.2–3.4)
LYMPHOCYTES # BLD AUTO: 7.5 % — LOW (ref 20.5–51.1)
MAGNESIUM SERPL-MCNC: 1.9 MG/DL — SIGNIFICANT CHANGE UP (ref 1.8–2.4)
MCHC RBC-ENTMCNC: 23.3 PG — LOW (ref 27–31)
MCHC RBC-ENTMCNC: 28.3 G/DL — LOW (ref 32–37)
MCV RBC AUTO: 82.3 FL — SIGNIFICANT CHANGE UP (ref 81–99)
MONOCYTES # BLD AUTO: 0.14 K/UL — SIGNIFICANT CHANGE UP (ref 0.1–0.6)
MONOCYTES NFR BLD AUTO: 2.3 % — SIGNIFICANT CHANGE UP (ref 1.7–9.3)
NEUTROPHILS # BLD AUTO: 5.42 K/UL — SIGNIFICANT CHANGE UP (ref 1.4–6.5)
NEUTROPHILS NFR BLD AUTO: 88.4 % — HIGH (ref 42.2–75.2)
NRBC # BLD: 0 /100 WBCS — SIGNIFICANT CHANGE UP (ref 0–0)
PLATELET # BLD AUTO: 254 K/UL — SIGNIFICANT CHANGE UP (ref 130–400)
POTASSIUM SERPL-MCNC: 4.2 MMOL/L — SIGNIFICANT CHANGE UP (ref 3.5–5)
POTASSIUM SERPL-SCNC: 4.2 MMOL/L — SIGNIFICANT CHANGE UP (ref 3.5–5)
PROT SERPL-MCNC: 6.2 G/DL — SIGNIFICANT CHANGE UP (ref 6–8)
RBC # BLD: 4.12 M/UL — LOW (ref 4.2–5.4)
RBC # FLD: 18.8 % — HIGH (ref 11.5–14.5)
SODIUM SERPL-SCNC: 141 MMOL/L — SIGNIFICANT CHANGE UP (ref 135–146)
WBC # BLD: 6.13 K/UL — SIGNIFICANT CHANGE UP (ref 4.8–10.8)
WBC # FLD AUTO: 6.13 K/UL — SIGNIFICANT CHANGE UP (ref 4.8–10.8)

## 2023-02-05 PROCEDURE — 99223 1ST HOSP IP/OBS HIGH 75: CPT

## 2023-02-05 PROCEDURE — 99233 SBSQ HOSP IP/OBS HIGH 50: CPT

## 2023-02-05 RX ADMIN — ATORVASTATIN CALCIUM 40 MILLIGRAM(S): 80 TABLET, FILM COATED ORAL at 00:57

## 2023-02-05 RX ADMIN — Medication 20 MILLIGRAM(S): at 07:34

## 2023-02-05 RX ADMIN — Medication 3 MILLILITER(S): at 03:19

## 2023-02-05 RX ADMIN — Medication 5 MILLIGRAM(S): at 05:38

## 2023-02-05 RX ADMIN — GABAPENTIN 600 MILLIGRAM(S): 400 CAPSULE ORAL at 05:38

## 2023-02-05 RX ADMIN — RISPERIDONE 1 MILLIGRAM(S): 4 TABLET ORAL at 23:00

## 2023-02-05 RX ADMIN — CARVEDILOL PHOSPHATE 12.5 MILLIGRAM(S): 80 CAPSULE, EXTENDED RELEASE ORAL at 05:38

## 2023-02-05 RX ADMIN — RISPERIDONE 1 MILLIGRAM(S): 4 TABLET ORAL at 00:57

## 2023-02-05 RX ADMIN — CLOPIDOGREL BISULFATE 75 MILLIGRAM(S): 75 TABLET, FILM COATED ORAL at 13:39

## 2023-02-05 RX ADMIN — ATORVASTATIN CALCIUM 40 MILLIGRAM(S): 80 TABLET, FILM COATED ORAL at 22:24

## 2023-02-05 RX ADMIN — Medication 5 MILLIGRAM(S): at 23:00

## 2023-02-05 RX ADMIN — AZITHROMYCIN 255 MILLIGRAM(S): 500 TABLET, FILM COATED ORAL at 18:58

## 2023-02-05 RX ADMIN — CEFTRIAXONE 100 MILLIGRAM(S): 500 INJECTION, POWDER, FOR SOLUTION INTRAMUSCULAR; INTRAVENOUS at 05:38

## 2023-02-05 RX ADMIN — GABAPENTIN 600 MILLIGRAM(S): 400 CAPSULE ORAL at 18:56

## 2023-02-05 RX ADMIN — Medication 3 MILLILITER(S): at 07:34

## 2023-02-05 RX ADMIN — Medication 81 MILLIGRAM(S): at 13:39

## 2023-02-05 RX ADMIN — Medication 40 MILLIGRAM(S): at 13:38

## 2023-02-05 RX ADMIN — PANTOPRAZOLE SODIUM 40 MILLIGRAM(S): 20 TABLET, DELAYED RELEASE ORAL at 07:50

## 2023-02-05 RX ADMIN — Medication 1 MILLIGRAM(S): at 07:34

## 2023-02-05 RX ADMIN — Medication 40 MILLIGRAM(S): at 05:38

## 2023-02-05 RX ADMIN — Medication 1 MILLIGRAM(S): at 18:58

## 2023-02-05 NOTE — PROGRESS NOTE ADULT - SUBJECTIVE AND OBJECTIVE BOX
KIMBER BARBER  82y, Female  Allergy: No Known Allergies    Hospital Day: 1d    Patient seen and examined. No acute events overnight    PMH/PSH:  PAST MEDICAL & SURGICAL HISTORY:  DM (diabetes mellitus)      CAD (coronary artery disease)      HLD (hyperlipidemia)      HTN (hypertension)      Neuropathy      COPD (chronic obstructive pulmonary disease)      Chronic systolic congestive heart failure          VITALS:  T(F): 98.7 (23 @ 07:50), Max: 98.7 (23 @ 07:50)  HR: 73 (23 @ 07:50)  BP: 136/- (23 @ 07:50) (118/56 - 136/-)  RR: 18 (23 @ 07:50)  SpO2: 98% (23 @ 07:50)    TESTS & MEASUREMENTS:  Weight (Kg): 68 (23 @ 15:07)                            9.6    6.13  )-----------( 254      ( 2023 06:02 )             33.9     PT/INR - ( 2023 17:10 )   PT: 14.60 sec;   INR: 1.27 ratio         PTT - ( 2023 17:10 )  PTT:27.1 sec      141  |  102  |  20  ----------------------------<  166<H>  4.2   |  28  |  0.9    Ca    9.3      2023 06:02  Mg     1.9     -    TPro  6.2  /  Alb  3.7  /  TBili  0.2  /  DBili  x   /  AST  22  /  ALT  42<H>  /  AlkPhos  74  02-05    LIVER FUNCTIONS - ( 2023 06:02 )  Alb: 3.7 g/dL / Pro: 6.2 g/dL / ALK PHOS: 74 U/L / ALT: 42 U/L / AST: 22 U/L / GGT: x           CARDIAC MARKERS ( 2023 17:10 )  x     / <0.01 ng/mL / x     / x     / x            Urinalysis Basic - ( 2023 20:10 )    Color: Light Yellow / Appearance: Clear / S.018 / pH: x  Gluc: x / Ketone: Negative  / Bili: Negative / Urobili: <2 mg/dL   Blood: x / Protein: Trace / Nitrite: Negative   Leuk Esterase: Negative / RBC: x / WBC x   Sq Epi: x / Non Sq Epi: x / Bacteria: x        RADIOLOGY & ADDITIONAL TESTS:    RECENT DIAGNOSTIC ORDERS:  Magnesium, Serum: AM Sched. Collection: 2023 04:30 (23 @ 14:43)  Complete Blood Count + Automated Diff: AM Sched. Collection: 2023 04:30 (23 @ 14:43)  Comprehensive Metabolic Panel: AM Sched. Collection: 2023 04:30 (23 @ 14:43)  Magnesium, Serum: AM Sched. Collection: 2023 04:30 (23 @ 00:19)  Diet, DASH/TLC:   Sodium & Cholesterol Restricted  Consistent Carbohydrate Evening Snack (23 @ 21:42)  Complete Blood Count + Automated Diff: Repeat From: 2023 21:41 To: 2023 04:30, Every 1 day(s) (23 @ 21:42)  Comprehensive Metabolic Panel: Repeat From: 2023 21:41 To: 2023 04:30, Every 1 day(s) (23 @ 21:42)  Procalcitonin, Serum: AM Sched. Collection: 2023 04:30 (23 @ 21:42)  Culture - Urine: Routine  Specimen Source: Clean Catch (Midstream) (23 @ 16:16)  Culture - Blood: Routine  Specimen Source: Blood-Peripheral  Addl Info: Peripheral Site 2 (23 @ 16:16)  Culture - Blood: Routine  Specimen Source: Blood-Peripheral  Addl Info: Peripheral Site 1 (23 @ 16:16)      MEDICATIONS:  MEDICATIONS  (STANDING):  albuterol/ipratropium for Nebulization 3 milliLiter(s) Nebulizer every 6 hours  ALPRAZolam 1 milliGRAM(s) Oral two times a day  aspirin enteric coated 81 milliGRAM(s) Oral daily  atorvastatin 40 milliGRAM(s) Oral at bedtime  azithromycin  IVPB 500 milliGRAM(s) IV Intermittent every 24 hours  budesonide 160 MICROgram(s)/formoterol 4.5 MICROgram(s) Inhaler 2 Puff(s) Inhalation two times a day  carvedilol 12.5 milliGRAM(s) Oral every 12 hours  cefTRIAXone   IVPB 1000 milliGRAM(s) IV Intermittent every 24 hours  clopidogrel Tablet 75 milliGRAM(s) Oral daily  enoxaparin Injectable 40 milliGRAM(s) SubCutaneous every 24 hours  gabapentin 600 milliGRAM(s) Oral two times a day  methylPREDNISolone sodium succinate Injectable 40 milliGRAM(s) IV Push every 8 hours  oxybutynin 5 milliGRAM(s) Oral two times a day  pantoprazole    Tablet 40 milliGRAM(s) Oral before breakfast  risperiDONE   Tablet 1 milliGRAM(s) Oral at bedtime  sertraline 100 milliGRAM(s) Oral daily  tiotropium 2.5 MICROgram(s) Inhaler 2 Puff(s) Inhalation daily    MEDICATIONS  (PRN):  acetaminophen     Tablet .. 650 milliGRAM(s) Oral every 6 hours PRN Temp greater or equal to 38C (100.4F), Mild Pain (1 - 3)  aluminum hydroxide/magnesium hydroxide/simethicone Suspension 30 milliLiter(s) Oral every 4 hours PRN Dyspepsia  dicyclomine 20 milliGRAM(s) Oral three times a day before meals PRN abdominal pain  melatonin 3 milliGRAM(s) Oral at bedtime PRN Insomnia  ondansetron Injectable 4 milliGRAM(s) IV Push every 8 hours PRN Nausea and/or Vomiting      HOME MEDICATIONS:  albuterol 90 mcg/inh inhalation aerosol ()  aspirin 81 mg oral delayed release tablet ()  carvedilol 25 mg oral tablet ()  dicyclomine 20 mg oral tablet ()  gabapentin 600 mg oral tablet ()  pantoprazole 40 mg oral delayed release tablet ()  risperiDONE 1 mg oral tablet ()  solifenacin 5 mg oral tablet ()  Tradjenta 5 mg oral tablet ()  Trelegy Ellipta 200 mcg-62.5 mcg-25 mcg/inh inhalation powder ()  Xanax 1 mg oral tablet ()  Zoloft 100 mg oral tablet ()      REVIEW OF SYSTEMS:  All other review of systems is negative unless indicated above.     PHYSICAL EXAM:  PHYSICAL EXAM:  GENERAL: NAD, well-developed  HEAD:  Atraumatic, Normocephalic  NECK: Supple, No JVD  CHEST/LUNG: Mild b/l expiratory wheeze  HEART: Regular rate and rhythm; No murmurs, rubs, or gallops  ABDOMEN: Soft, Nontender, Nondistended; Bowel sounds present  EXTREMITIES:  2+ Peripheral Pulses, No clubbing, cyanosis, or edema  SKIN: No rashes or lesions

## 2023-02-05 NOTE — CONSULT NOTE ADULT - ASSESSMENT
KIMBER BARBER is a 82y woman with a medical history significant for ILD + COPD on 2L, CAD s/p stent and ICM who presented initially with increasing shortness of breath and sputum production, and is now admitted for acute exacerbation of COPD.  Pulmonary is consulted for further management of complex lung disease.    IMPRESSION    Subacute hypoxemic resp failure  ILD  Anemia  COPD exacerbation  HO COPD  HO CAD s/p PCI      RECOMMENDATIONS:  - Agree with Prednisone 40mg daily (or IV equivalent), 3-day course of Azithromycin 500mg  - Standing duo-nebs, albuterol PRN  - Standing LABA-ICS  - Would d/c Ceftriaxone, no clinical evidence of pneumonia  - Outpatient PFTs, repeat CT in 4-6 weeks.         ************************************THIS NOTE IS INCOMPLETE KIMBER BARBER is a 82y woman with a medical history significant for ILD + COPD on 2L, CAD s/p stent and ICM who presented initially with increasing shortness of breath and sputum production, and is now admitted for acute exacerbation of COPD.  Pulmonary is consulted for further management of complex lung disease.    IMPRESSION    Subacute hypoxemic resp failure  ILD  Anemia  COPD exacerbation  HO COPD  HO CAD s/p PCI      RECOMMENDATIONS:  - Agree with Prednisone 40mg daily (or IV equivalent), 3-day course of Azithromycin 500mg  - Standing duo-nebs, albuterol PRN  - Standing LABA-ICS  - Would d/c Ceftriaxone, no clinical evidence of pneumonia  - Resume Abx if symptoms worsen  - Outpatient PFTs, repeat CT in 4-6 weeks.

## 2023-02-05 NOTE — CONSULT NOTE ADULT - SUBJECTIVE AND OBJECTIVE BOX
Patient is a 82y old  Female who presents with a chief complaint of     HPI:  83 yo female Pmhx ILD, Copd (sent on 2L NC last admission), Cad with stent x 2019, Dm, Htn, HFrEF (40-45%) presenting for SOB and dysuria. history goes back to 3 days ago when patient started complaining for SOB at rest associated with increased cough and increased production of whitish sputum. she also reported low grade fever (around 100 F), rhinorrhea, sinus congestion occasional headache, and diarrhea (around 3 episodes/day, watery, non bloody, does not remember when it started but started taking imodium due to the severity of it). patient also complaining of suprapubic pain associated with dysuria -> she took a urine test and reported having a UTI but did not start any antibiotics. she reported using many pillows at night to sleep but no chest pain, palpitation, nausea or vomiting.      VITALS:   T(C): 36.6 (23 @ 19:27), Max: 37.7 (23 @ 15:07)  HR: 77 (23 @ 19:27) (70 - 77)  BP: 118/56 (23 @ 19:27) (95/50 - 118/56)  RR: 20 (23 @ 19:27) (20 - 22)  SpO2: 98% (23 @ 19:27) (98% - 98%)    in ED, vitals were WNLs. labs significant for proBNP 2K. patient received 2.1L LR bolus, solumedrol 125 mg IV, azithromycin and admitted to medicine for further management (2023 20:41)      PAST MEDICAL & SURGICAL HISTORY:  DM (diabetes mellitus)      CAD (coronary artery disease)      HLD (hyperlipidemia)      HTN (hypertension)      Neuropathy      COPD (chronic obstructive pulmonary disease)      Chronic systolic congestive heart failure          SOCIAL HX:   Smoking                         ETOH                            Other    FAMILY HISTORY:  .  No cardiovascular or pulmonary family history     REVIEW OF SYSTEMS:    All ROS are negative exept per HPI       Allergies    No Known Allergies    Intolerances          PHYSICAL EXAM  Vital Signs Last 24 Hrs  T(C): 36.6 (2023 19:27), Max: 37.7 (2023 15:07)  T(F): 97.8 (2023 19:27), Max: 99.9 (2023 15:07)  HR: 84 (2023 23:20) (70 - 84)  BP: 132/58 (2023 23:20) (95/50 - 132/58)  BP(mean): --  RR: 18 (2023 23:20) (18 - 22)  SpO2: 100% (2023 23:20) (98% - 100%)    Parameters below as of 2023 23:20  Patient On (Oxygen Delivery Method): room air  O2 Flow (L/min): 3    Constitutional: no acute distress, well nourished well developed  Neuro: moving all 4 limbs spontaneously, no facial droop or dysarthria  HEENT: NCAT, anicteric  Neck: no visible lymphadenopathy or goiter  Pulm: no respiratory distress. clear to auscultation bilaterally  Cardiac: extremities appear pink and well-perfused.  regular rhythm and rate, no murmur detected  Abdomen: non-distended  Extremities: no peripheral edema  Skin: no visible rashes        LABS:                          8.4    9.27  )-----------( 239      ( 2023 17:10 )             29.4                                               02-04    135  |  100  |  22<H>  ----------------------------<  92  3.5   |  27  |  0.9    Ca    8.9      2023 17:10    TPro  5.3<L>  /  Alb  3.3<L>  /  TBili  0.2  /  DBili  x   /  AST  24  /  ALT  38  /  AlkPhos  62  02-04      PT/INR - ( 2023 17:10 )   PT: 14.60 sec;   INR: 1.27 ratio         PTT - ( 2023 17:10 )  PTT:27.1 sec                                       Urinalysis Basic - ( 2023 20:10 )    Color: Light Yellow / Appearance: Clear / S.018 / pH: x  Gluc: x / Ketone: Negative  / Bili: Negative / Urobili: <2 mg/dL   Blood: x / Protein: Trace / Nitrite: Negative   Leuk Esterase: Negative / RBC: x / WBC x   Sq Epi: x / Non Sq Epi: x / Bacteria: x        CARDIAC MARKERS ( 2023 17:10 )  x     / <0.01 ng/mL / x     / x     / x                                                LIVER FUNCTIONS - ( 2023 17:10 )  Alb: 3.3 g/dL / Pro: 5.3 g/dL / ALK PHOS: 62 U/L / ALT: 38 U/L / AST: 24 U/L / GGT: x                                                                                                MEDICATIONS  (STANDING):  albuterol/ipratropium for Nebulization 3 milliLiter(s) Nebulizer every 6 hours  ALPRAZolam 1 milliGRAM(s) Oral two times a day  aspirin enteric coated 81 milliGRAM(s) Oral daily  atorvastatin 40 milliGRAM(s) Oral at bedtime  azithromycin  IVPB 500 milliGRAM(s) IV Intermittent every 24 hours  budesonide 160 MICROgram(s)/formoterol 4.5 MICROgram(s) Inhaler 2 Puff(s) Inhalation two times a day  carvedilol 12.5 milliGRAM(s) Oral every 12 hours  cefTRIAXone   IVPB 1000 milliGRAM(s) IV Intermittent every 24 hours  clopidogrel Tablet 75 milliGRAM(s) Oral daily  enoxaparin Injectable 40 milliGRAM(s) SubCutaneous every 24 hours  gabapentin 600 milliGRAM(s) Oral two times a day  methylPREDNISolone sodium succinate Injectable 40 milliGRAM(s) IV Push every 8 hours  oxybutynin 5 milliGRAM(s) Oral two times a day  pantoprazole    Tablet 40 milliGRAM(s) Oral before breakfast  risperiDONE   Tablet 1 milliGRAM(s) Oral at bedtime  sertraline 100 milliGRAM(s) Oral daily  tiotropium 2.5 MICROgram(s) Inhaler 2 Puff(s) Inhalation daily    MEDICATIONS  (PRN):  acetaminophen     Tablet .. 650 milliGRAM(s) Oral every 6 hours PRN Temp greater or equal to 38C (100.4F), Mild Pain (1 - 3)  aluminum hydroxide/magnesium hydroxide/simethicone Suspension 30 milliLiter(s) Oral every 4 hours PRN Dyspepsia  dicyclomine 20 milliGRAM(s) Oral three times a day before meals PRN abdominal pain  melatonin 3 milliGRAM(s) Oral at bedtime PRN Insomnia  ondansetron Injectable 4 milliGRAM(s) IV Push every 8 hours PRN Nausea and/or Vomiting      X-Rays reviewed:    CXR interpreted by me:    images and radiologist read reviewed, by my read demonstrating stable bilateral interstitial disease with bilateral blunting of costophrenic angles   CT chest images and reads reviewed, demonstrating bilateral interstitial disease with GGO, no honeycombing seen, but significant traction bronchiectasis.  Areas of dense consolidation seen, R>L, overall disease basilar prominence

## 2023-02-05 NOTE — ED ADULT NURSE REASSESSMENT NOTE - NS ED NURSE REASSESS COMMENT FT1
pt is awake, a&Ox3, endorsed from previous RN that pt does not have PIV, MD lucas METZ notified. pt awaiting in patient bed placement. cardiac monitoring in progress

## 2023-02-06 ENCOUNTER — TRANSCRIPTION ENCOUNTER (OUTPATIENT)
Age: 83
End: 2023-02-06

## 2023-02-06 DIAGNOSIS — J96.01 ACUTE RESPIRATORY FAILURE WITH HYPOXIA: ICD-10-CM

## 2023-02-06 DIAGNOSIS — J44.1 CHRONIC OBSTRUCTIVE PULMONARY DISEASE WITH (ACUTE) EXACERBATION: ICD-10-CM

## 2023-02-06 DIAGNOSIS — E78.5 HYPERLIPIDEMIA, UNSPECIFIED: ICD-10-CM

## 2023-02-06 DIAGNOSIS — J84.9 INTERSTITIAL PULMONARY DISEASE, UNSPECIFIED: ICD-10-CM

## 2023-02-06 DIAGNOSIS — D64.9 ANEMIA, UNSPECIFIED: ICD-10-CM

## 2023-02-06 DIAGNOSIS — Z66 DO NOT RESUSCITATE: ICD-10-CM

## 2023-02-06 DIAGNOSIS — Z95.5 PRESENCE OF CORONARY ANGIOPLASTY IMPLANT AND GRAFT: ICD-10-CM

## 2023-02-06 DIAGNOSIS — R06.02 SHORTNESS OF BREATH: ICD-10-CM

## 2023-02-06 DIAGNOSIS — E11.40 TYPE 2 DIABETES MELLITUS WITH DIABETIC NEUROPATHY, UNSPECIFIED: ICD-10-CM

## 2023-02-06 DIAGNOSIS — I25.10 ATHEROSCLEROTIC HEART DISEASE OF NATIVE CORONARY ARTERY WITHOUT ANGINA PECTORIS: ICD-10-CM

## 2023-02-06 DIAGNOSIS — G62.9 POLYNEUROPATHY, UNSPECIFIED: ICD-10-CM

## 2023-02-06 DIAGNOSIS — J15.6 PNEUMONIA DUE TO OTHER GRAM-NEGATIVE BACTERIA: ICD-10-CM

## 2023-02-06 DIAGNOSIS — Z79.82 LONG TERM (CURRENT) USE OF ASPIRIN: ICD-10-CM

## 2023-02-06 DIAGNOSIS — I10 ESSENTIAL (PRIMARY) HYPERTENSION: ICD-10-CM

## 2023-02-06 DIAGNOSIS — Z87.891 PERSONAL HISTORY OF NICOTINE DEPENDENCE: ICD-10-CM

## 2023-02-06 DIAGNOSIS — J90 PLEURAL EFFUSION, NOT ELSEWHERE CLASSIFIED: ICD-10-CM

## 2023-02-06 DIAGNOSIS — Z20.822 CONTACT WITH AND (SUSPECTED) EXPOSURE TO COVID-19: ICD-10-CM

## 2023-02-06 DIAGNOSIS — R94.31 ABNORMAL ELECTROCARDIOGRAM [ECG] [EKG]: ICD-10-CM

## 2023-02-06 LAB
ALBUMIN SERPL ELPH-MCNC: 3 G/DL — LOW (ref 3.5–5.2)
ALP SERPL-CCNC: 53 U/L — SIGNIFICANT CHANGE UP (ref 30–115)
ALT FLD-CCNC: 33 U/L — SIGNIFICANT CHANGE UP (ref 0–41)
ANION GAP SERPL CALC-SCNC: 8 MMOL/L — SIGNIFICANT CHANGE UP (ref 7–14)
AST SERPL-CCNC: 21 U/L — SIGNIFICANT CHANGE UP (ref 0–41)
BASOPHILS # BLD AUTO: 0.01 K/UL — SIGNIFICANT CHANGE UP (ref 0–0.2)
BASOPHILS NFR BLD AUTO: 0.1 % — SIGNIFICANT CHANGE UP (ref 0–1)
BILIRUB SERPL-MCNC: <0.2 MG/DL — SIGNIFICANT CHANGE UP (ref 0.2–1.2)
BUN SERPL-MCNC: 18 MG/DL — SIGNIFICANT CHANGE UP (ref 10–20)
CALCIUM SERPL-MCNC: 8.7 MG/DL — SIGNIFICANT CHANGE UP (ref 8.4–10.5)
CHLORIDE SERPL-SCNC: 104 MMOL/L — SIGNIFICANT CHANGE UP (ref 98–110)
CO2 SERPL-SCNC: 30 MMOL/L — SIGNIFICANT CHANGE UP (ref 17–32)
CREAT SERPL-MCNC: 0.7 MG/DL — SIGNIFICANT CHANGE UP (ref 0.7–1.5)
CULTURE RESULTS: NO GROWTH — SIGNIFICANT CHANGE UP
EGFR: 87 ML/MIN/1.73M2 — SIGNIFICANT CHANGE UP
EOSINOPHIL # BLD AUTO: 0.06 K/UL — SIGNIFICANT CHANGE UP (ref 0–0.7)
EOSINOPHIL NFR BLD AUTO: 0.6 % — SIGNIFICANT CHANGE UP (ref 0–8)
GLUCOSE BLDC GLUCOMTR-MCNC: 102 MG/DL — HIGH (ref 70–99)
GLUCOSE BLDC GLUCOMTR-MCNC: 265 MG/DL — HIGH (ref 70–99)
GLUCOSE BLDC GLUCOMTR-MCNC: 89 MG/DL — SIGNIFICANT CHANGE UP (ref 70–99)
GLUCOSE SERPL-MCNC: 99 MG/DL — SIGNIFICANT CHANGE UP (ref 70–99)
HCT VFR BLD CALC: 28.7 % — LOW (ref 37–47)
HGB BLD-MCNC: 8.3 G/DL — LOW (ref 12–16)
IMM GRANULOCYTES NFR BLD AUTO: 1.4 % — HIGH (ref 0.1–0.3)
LYMPHOCYTES # BLD AUTO: 1.05 K/UL — LOW (ref 1.2–3.4)
LYMPHOCYTES # BLD AUTO: 10.7 % — LOW (ref 20.5–51.1)
MAGNESIUM SERPL-MCNC: 1.9 MG/DL — SIGNIFICANT CHANGE UP (ref 1.8–2.4)
MCHC RBC-ENTMCNC: 23.6 PG — LOW (ref 27–31)
MCHC RBC-ENTMCNC: 28.9 G/DL — LOW (ref 32–37)
MCV RBC AUTO: 81.8 FL — SIGNIFICANT CHANGE UP (ref 81–99)
MONOCYTES # BLD AUTO: 0.62 K/UL — HIGH (ref 0.1–0.6)
MONOCYTES NFR BLD AUTO: 6.3 % — SIGNIFICANT CHANGE UP (ref 1.7–9.3)
NEUTROPHILS # BLD AUTO: 7.95 K/UL — HIGH (ref 1.4–6.5)
NEUTROPHILS NFR BLD AUTO: 80.9 % — HIGH (ref 42.2–75.2)
NRBC # BLD: 0 /100 WBCS — SIGNIFICANT CHANGE UP (ref 0–0)
PLATELET # BLD AUTO: 264 K/UL — SIGNIFICANT CHANGE UP (ref 130–400)
POTASSIUM SERPL-MCNC: 3.8 MMOL/L — SIGNIFICANT CHANGE UP (ref 3.5–5)
POTASSIUM SERPL-SCNC: 3.8 MMOL/L — SIGNIFICANT CHANGE UP (ref 3.5–5)
PROCALCITONIN SERPL-MCNC: 0.13 NG/ML — HIGH (ref 0.02–0.1)
PROT SERPL-MCNC: 5.1 G/DL — LOW (ref 6–8)
RBC # BLD: 3.51 M/UL — LOW (ref 4.2–5.4)
RBC # FLD: 18.9 % — HIGH (ref 11.5–14.5)
SODIUM SERPL-SCNC: 142 MMOL/L — SIGNIFICANT CHANGE UP (ref 135–146)
SPECIMEN SOURCE: SIGNIFICANT CHANGE UP
WBC # BLD: 9.83 K/UL — SIGNIFICANT CHANGE UP (ref 4.8–10.8)
WBC # FLD AUTO: 9.83 K/UL — SIGNIFICANT CHANGE UP (ref 4.8–10.8)

## 2023-02-06 PROCEDURE — 99233 SBSQ HOSP IP/OBS HIGH 50: CPT

## 2023-02-06 RX ORDER — TIOTROPIUM BROMIDE 18 UG/1
2 CAPSULE ORAL; RESPIRATORY (INHALATION) DAILY
Refills: 0 | Status: DISCONTINUED | OUTPATIENT
Start: 2023-02-06 | End: 2023-02-09

## 2023-02-06 RX ORDER — BUDESONIDE AND FORMOTEROL FUMARATE DIHYDRATE 160; 4.5 UG/1; UG/1
2 AEROSOL RESPIRATORY (INHALATION)
Refills: 0 | Status: DISCONTINUED | OUTPATIENT
Start: 2023-02-06 | End: 2023-02-09

## 2023-02-06 RX ORDER — DEXTROSE 50 % IN WATER 50 %
25 SYRINGE (ML) INTRAVENOUS ONCE
Refills: 0 | Status: DISCONTINUED | OUTPATIENT
Start: 2023-02-06 | End: 2023-02-09

## 2023-02-06 RX ORDER — SODIUM CHLORIDE 9 MG/ML
1000 INJECTION, SOLUTION INTRAVENOUS
Refills: 0 | Status: DISCONTINUED | OUTPATIENT
Start: 2023-02-06 | End: 2023-02-09

## 2023-02-06 RX ORDER — INSULIN LISPRO 100/ML
VIAL (ML) SUBCUTANEOUS
Refills: 0 | Status: DISCONTINUED | OUTPATIENT
Start: 2023-02-06 | End: 2023-02-09

## 2023-02-06 RX ORDER — GLUCAGON INJECTION, SOLUTION 0.5 MG/.1ML
1 INJECTION, SOLUTION SUBCUTANEOUS ONCE
Refills: 0 | Status: DISCONTINUED | OUTPATIENT
Start: 2023-02-06 | End: 2023-02-09

## 2023-02-06 RX ORDER — DEXTROSE 50 % IN WATER 50 %
12.5 SYRINGE (ML) INTRAVENOUS ONCE
Refills: 0 | Status: DISCONTINUED | OUTPATIENT
Start: 2023-02-06 | End: 2023-02-09

## 2023-02-06 RX ORDER — LOPERAMIDE HCL 2 MG
2 TABLET ORAL ONCE
Refills: 0 | Status: COMPLETED | OUTPATIENT
Start: 2023-02-06 | End: 2023-02-06

## 2023-02-06 RX ORDER — FUROSEMIDE 40 MG
20 TABLET ORAL ONCE
Refills: 0 | Status: COMPLETED | OUTPATIENT
Start: 2023-02-06 | End: 2023-02-06

## 2023-02-06 RX ORDER — DEXTROSE 50 % IN WATER 50 %
15 SYRINGE (ML) INTRAVENOUS ONCE
Refills: 0 | Status: DISCONTINUED | OUTPATIENT
Start: 2023-02-06 | End: 2023-02-09

## 2023-02-06 RX ADMIN — ATORVASTATIN CALCIUM 40 MILLIGRAM(S): 80 TABLET, FILM COATED ORAL at 21:02

## 2023-02-06 RX ADMIN — Medication 1 MILLIGRAM(S): at 17:39

## 2023-02-06 RX ADMIN — BUDESONIDE AND FORMOTEROL FUMARATE DIHYDRATE 2 PUFF(S): 160; 4.5 AEROSOL RESPIRATORY (INHALATION) at 21:04

## 2023-02-06 RX ADMIN — Medication 20 MILLIGRAM(S): at 12:45

## 2023-02-06 RX ADMIN — Medication 1 MILLIGRAM(S): at 05:47

## 2023-02-06 RX ADMIN — AZITHROMYCIN 255 MILLIGRAM(S): 500 TABLET, FILM COATED ORAL at 17:36

## 2023-02-06 RX ADMIN — CLOPIDOGREL BISULFATE 75 MILLIGRAM(S): 75 TABLET, FILM COATED ORAL at 12:28

## 2023-02-06 RX ADMIN — Medication 2 MILLIGRAM(S): at 12:43

## 2023-02-06 RX ADMIN — Medication 3 MILLILITER(S): at 14:03

## 2023-02-06 RX ADMIN — GABAPENTIN 600 MILLIGRAM(S): 400 CAPSULE ORAL at 17:37

## 2023-02-06 RX ADMIN — ENOXAPARIN SODIUM 40 MILLIGRAM(S): 100 INJECTION SUBCUTANEOUS at 12:28

## 2023-02-06 RX ADMIN — Medication 40 MILLIGRAM(S): at 12:45

## 2023-02-06 RX ADMIN — Medication 81 MILLIGRAM(S): at 12:28

## 2023-02-06 RX ADMIN — Medication 3: at 22:18

## 2023-02-06 RX ADMIN — SERTRALINE 100 MILLIGRAM(S): 25 TABLET, FILM COATED ORAL at 12:29

## 2023-02-06 RX ADMIN — RISPERIDONE 1 MILLIGRAM(S): 4 TABLET ORAL at 21:02

## 2023-02-06 RX ADMIN — CARVEDILOL PHOSPHATE 12.5 MILLIGRAM(S): 80 CAPSULE, EXTENDED RELEASE ORAL at 05:46

## 2023-02-06 RX ADMIN — Medication 3 MILLILITER(S): at 08:30

## 2023-02-06 RX ADMIN — CARVEDILOL PHOSPHATE 12.5 MILLIGRAM(S): 80 CAPSULE, EXTENDED RELEASE ORAL at 17:37

## 2023-02-06 RX ADMIN — Medication 5 MILLIGRAM(S): at 17:36

## 2023-02-06 RX ADMIN — Medication 650 MILLIGRAM(S): at 17:39

## 2023-02-06 RX ADMIN — Medication 40 MILLIGRAM(S): at 17:37

## 2023-02-06 RX ADMIN — Medication 5 MILLIGRAM(S): at 05:47

## 2023-02-06 NOTE — PROGRESS NOTE ADULT - SUBJECTIVE AND OBJECTIVE BOX
Patient is a 82y old  Female who presents with a chief complaint of       Over Night Events:    Still wheezing   On 2 LPM nasal canula        ROS:  See HPI    PHYSICAL EXAM    ICU Vital Signs Last 24 Hrs  T(C): 35.8 (2023 04:35), Max: 36.3 (2023 17:13)  T(F): 96.4 (2023 04:35), Max: 97.3 (2023 17:13)  HR: 73 (2023 08:21) (69 - 82)  BP: 137/66 (2023 04:35) (110/62 - 137/66)  BP(mean): --  ABP: --  ABP(mean): --  RR: 18 (2023 04:35) (18 - 18)  SpO2: 97% (2023 08:21) (96% - 98%)    O2 Parameters below as of 2023 08:21  Patient On (Oxygen Delivery Method): nasal cannula  O2 Flow (L/min): 2          General: NAD  HEENT: CAL             Lymphatic system: No cervical LN   Lungs: Bilateral rhonchi, wheezing   Cardiovascular: Regular   Gastrointestinal: Soft, Positive BS  Extremities: No clubbing.  Moves extremities.  Full Range of motion   Skin: Warm, intact  Neurological: No motor or sensory deficit         LABS:                            8.3    9.83  )-----------( 264      ( 2023 08:03 )             28.7                                               02-06    142  |  104  |  18  ----------------------------<  99  3.8   |  30  |  0.7    Ca    8.7      2023 08:03  Mg     1.9     02-06    TPro  5.1<L>  /  Alb  3.0<L>  /  TBili  <0.2  /  DBili  x   /  AST  21  /  ALT  33  /  AlkPhos  53  02-06      PT/INR - ( 2023 17:10 )   PT: 14.60 sec;   INR: 1.27 ratio         PTT - ( 2023 17:10 )  PTT:27.1 sec                                       Urinalysis Basic - ( 2023 20:10 )    Color: Light Yellow / Appearance: Clear / S.018 / pH: x  Gluc: x / Ketone: Negative  / Bili: Negative / Urobili: <2 mg/dL   Blood: x / Protein: Trace / Nitrite: Negative   Leuk Esterase: Negative / RBC: x / WBC x   Sq Epi: x / Non Sq Epi: x / Bacteria: x        CARDIAC MARKERS ( 2023 17:10 )  x     / <0.01 ng/mL / x     / x     / x                                                LIVER FUNCTIONS - ( 2023 08:03 )  Alb: 3.0 g/dL / Pro: 5.1 g/dL / ALK PHOS: 53 U/L / ALT: 33 U/L / AST: 21 U/L / GGT: x                                                  Culture - Urine (collected 2023 20:15)  Source: Clean Catch Clean Catch (Midstream)  Final Report (2023 00:34):    No growth    Culture - Blood (collected 2023 17:05)  Source: .Blood Blood-Peripheral  Preliminary Report (2023 06:01):    No growth to date.    Culture - Blood (collected 2023 17:05)  Source: .Blood Blood-Peripheral  Preliminary Report (2023 06:01):    No growth to date.                                                                                           MEDICATIONS  (STANDING):  albuterol/ipratropium for Nebulization 3 milliLiter(s) Nebulizer every 6 hours  ALPRAZolam 1 milliGRAM(s) Oral two times a day  aspirin enteric coated 81 milliGRAM(s) Oral daily  atorvastatin 40 milliGRAM(s) Oral at bedtime  azithromycin  IVPB 500 milliGRAM(s) IV Intermittent every 24 hours  budesonide 160 MICROgram(s)/formoterol 4.5 MICROgram(s) Inhaler 2 Puff(s) Inhalation two times a day  carvedilol 12.5 milliGRAM(s) Oral every 12 hours  cefTRIAXone   IVPB 1000 milliGRAM(s) IV Intermittent every 24 hours  clopidogrel Tablet 75 milliGRAM(s) Oral daily  enoxaparin Injectable 40 milliGRAM(s) SubCutaneous every 24 hours  gabapentin 600 milliGRAM(s) Oral two times a day  methylPREDNISolone sodium succinate Injectable 40 milliGRAM(s) IV Push daily  oxybutynin 5 milliGRAM(s) Oral two times a day  pantoprazole    Tablet 40 milliGRAM(s) Oral before breakfast  risperiDONE   Tablet 1 milliGRAM(s) Oral at bedtime  sertraline 100 milliGRAM(s) Oral daily  tiotropium 2.5 MICROgram(s) Inhaler 2 Puff(s) Inhalation daily    MEDICATIONS  (PRN):  acetaminophen     Tablet .. 650 milliGRAM(s) Oral every 6 hours PRN Temp greater or equal to 38C (100.4F), Mild Pain (1 - 3)  aluminum hydroxide/magnesium hydroxide/simethicone Suspension 30 milliLiter(s) Oral every 4 hours PRN Dyspepsia  dicyclomine 20 milliGRAM(s) Oral three times a day before meals PRN abdominal pain  melatonin 3 milliGRAM(s) Oral at bedtime PRN Insomnia  ondansetron Injectable 4 milliGRAM(s) IV Push every 8 hours PRN Nausea and/or Vomiting      Xrays: stable diffuse infiltrates                                                                                     ECHO

## 2023-02-06 NOTE — PHYSICAL THERAPY INITIAL EVALUATION ADULT - ADDITIONAL COMMENTS
Lives with daughter, PH with 3 + 10 LITO with R HR. Was Independent household ambulator using rolling walker

## 2023-02-06 NOTE — DISCHARGE NOTE NURSING/CASE MANAGEMENT/SOCIAL WORK - PATIENT PORTAL LINK FT
You can access the FollowMyHealth Patient Portal offered by [facility] by registering at the following website: [email or web address]. By joining [employer]’s FollowMyHealth portal, you will also be able to view your health information using other applications (apps) compatible with our system.

## 2023-02-06 NOTE — PATIENT PROFILE ADULT - FALL HARM RISK - HARM RISK INTERVENTIONS

## 2023-02-06 NOTE — PHYSICAL THERAPY INITIAL EVALUATION ADULT - GENERAL OBSERVATIONS, REHAB EVAL
PT IE completed. Pt encountered semifowler in bed, in NAD, +IV lock, O2 2L nc, + dry cough, daughter at b/s, agreeable to PT. Pt requires Mod A bed mobility, Min A sit<>stand and was able to take ft feet side stepping side of bed using rolling walker.

## 2023-02-06 NOTE — PHYSICAL THERAPY INITIAL EVALUATION ADULT - GAIT TRAINING, PT EVAL
will improve gait using  rolling walker with CGA for 25 feet by discharge to decrease burden of care.

## 2023-02-06 NOTE — PROGRESS NOTE ADULT - SUBJECTIVE AND OBJECTIVE BOX
SUBJECTIVE:    Patient is a 82y old Female who presents with a chief complaint of   Currently admitted to medicine with the primary diagnosis of:    Today is hospital day 2d.     Overnight Events:     no overnight events     PAST MEDICAL & SURGICAL HISTORY  DM (diabetes mellitus)    CAD (coronary artery disease)    HLD (hyperlipidemia)    HTN (hypertension)    Neuropathy    COPD (chronic obstructive pulmonary disease)    Chronic systolic congestive heart failure        ALLERGIES:  No Known Allergies    MEDICATIONS:  STANDING MEDICATIONS  albuterol/ipratropium for Nebulization 3 milliLiter(s) Nebulizer every 6 hours  ALPRAZolam 1 milliGRAM(s) Oral two times a day  aspirin enteric coated 81 milliGRAM(s) Oral daily  atorvastatin 40 milliGRAM(s) Oral at bedtime  azithromycin  IVPB 500 milliGRAM(s) IV Intermittent every 24 hours  budesonide 160 MICROgram(s)/formoterol 4.5 MICROgram(s) Inhaler 2 Puff(s) Inhalation two times a day  carvedilol 12.5 milliGRAM(s) Oral every 12 hours  cefTRIAXone   IVPB 1000 milliGRAM(s) IV Intermittent every 24 hours  clopidogrel Tablet 75 milliGRAM(s) Oral daily  enoxaparin Injectable 40 milliGRAM(s) SubCutaneous every 24 hours  furosemide   Injectable 20 milliGRAM(s) IV Push once  gabapentin 600 milliGRAM(s) Oral two times a day  loperamide 2 milliGRAM(s) Oral once  methylPREDNISolone sodium succinate Injectable 40 milliGRAM(s) IV Push every 12 hours  oxybutynin 5 milliGRAM(s) Oral two times a day  pantoprazole    Tablet 40 milliGRAM(s) Oral before breakfast  risperiDONE   Tablet 1 milliGRAM(s) Oral at bedtime  sertraline 100 milliGRAM(s) Oral daily  tiotropium 2.5 MICROgram(s) Inhaler 2 Puff(s) Inhalation daily    PRN MEDICATIONS  acetaminophen     Tablet .. 650 milliGRAM(s) Oral every 6 hours PRN  aluminum hydroxide/magnesium hydroxide/simethicone Suspension 30 milliLiter(s) Oral every 4 hours PRN  dicyclomine 20 milliGRAM(s) Oral three times a day before meals PRN  melatonin 3 milliGRAM(s) Oral at bedtime PRN  ondansetron Injectable 4 milliGRAM(s) IV Push every 8 hours PRN    VITALS:   ICU Vital Signs Last 24 Hrs  T(C): 35.8 (2023 04:35), Max: 36.3 (2023 17:13)  T(F): 96.4 (2023 04:35), Max: 97.3 (2023 17:13)  HR: 73 (2023 08:21) (69 - 82)  BP: 137/66 (2023 04:35) (110/62 - 137/66)  BP(mean): --  ABP: --  ABP(mean): --  RR: 18 (2023 04:35) (18 - 18)  SpO2: 97% (2023 08:21) (96% - 98%)    O2 Parameters below as of 2023 08:21  Patient On (Oxygen Delivery Method): nasal cannula  O2 Flow (L/min): 2          LABS:                        8.3    9.83  )-----------( 264      ( 2023 08:03 )             28.7     02-06    142  |  104  |  18  ----------------------------<  99  3.8   |  30  |  0.7    Ca    8.7      2023 08:03  Mg     1.9     02-06    TPro  5.1<L>  /  Alb  3.0<L>  /  TBili  <0.2  /  DBili  x   /  AST  21  /  ALT  33  /  AlkPhos  53  02-06    PT/INR - ( 2023 17:10 )   PT: 14.60 sec;   INR: 1.27 ratio         PTT - ( 2023 17:10 )  PTT:27.1 sec  Urinalysis Basic - ( 2023 20:10 )    Color: Light Yellow / Appearance: Clear / S.018 / pH: x  Gluc: x / Ketone: Negative  / Bili: Negative / Urobili: <2 mg/dL   Blood: x / Protein: Trace / Nitrite: Negative   Leuk Esterase: Negative / RBC: x / WBC x   Sq Epi: x / Non Sq Epi: x / Bacteria: x            Culture - Urine (collected 2023 20:15)  Source: Clean Catch Clean Catch (Midstream)  Final Report (2023 00:34):    No growth    Culture - Blood (collected 2023 17:05)  Source: .Blood Blood-Peripheral  Preliminary Report (2023 06:01):    No growth to date.    Culture - Blood (collected 2023 17:05)  Source: .Blood Blood-Peripheral  Preliminary Report (2023 06:01):    No growth to date.      CARDIAC MARKERS ( 2023 17:10 )  x     / <0.01 ng/mL / x     / x     / x            RADIOLOGY:  < from: Xray Chest 1 View-PORTABLE IMMEDIATE (23 @ 16:31) >    Stable diffuse interstitial opacities. Blunted left costophrenic angle.   No pneumothorax    < end of copied text >    PHYSICAL EXAM:  GEN: laying in bed  LUNGS: on 1.5 NC  HEART: s1 s2  ABD: nontender   EXT: no lower extremity edema  NEURO: awake alert

## 2023-02-06 NOTE — PHYSICAL THERAPY INITIAL EVALUATION ADULT - PERTINENT HX OF CURRENT PROBLEM, REHAB EVAL
82y woman with a medical history significant for ILD + COPD on 2L, CAD s/p stent and ICM who presented initially with increasing shortness of breath and sputum production, and is now admitted for acute exacerbation of COPD.

## 2023-02-06 NOTE — PATIENT PROFILE ADULT - HAVE YOU EXPERIENCED VIOLENCE OR A TRAUMATIC EVENT?
Infectious Diseases Daily Progress Note      Patient's Name: Gaston Tomlinson   Date of Service: 7/12/2017    I Date of admission: 7/10/2017  I  Hospital Day: 3     Admitting Diagnosis:  R29.898 Weakness of both lower extremities  (primary encounter diagnosis)  R29.6 Falls frequently  E86.0 Dehydration  E87.6 Hypokalemia  E83.42 Hypomagnesemia  C67.9 Malignant neoplasm of urinary bladder, unspecified site (CMS/HCC)  C79.51 Spine metastasis (CMS/HCC)  D72.829 Leukocytosis, unspecified type  E87.1 Hyponatremia     Current Antimicrobials:                           none  Scheduled Medications   dexamethasone 4 mg 4 times per day   vitamin - therapeutic multivitamins w/minerals 1 tablet Daily   atorvastatin 20 mg Nightly   ferrous sulfate 325 mg Daily with breakfast   polyethylene glycol 17 g Daily   minoxidil 2.5 mg BID   morphine SR 15 mg BID   losartan 100 mg Daily   sodium chloride (PF) 2 mL 2 times per day   heparin (porcine) 5,000 Units 3 times per day      Past Medical History, Social History, Medications and Allergies reviewed.   Reviewed Pertinent: Laboratory studies, radiographic studies, medications, and recent progress notes.     Subjective:  LE weakness is unchanged. MRI shows progression of spinal mets.        Vital Signs:   Visit Vitals  /81 (BP Location: E, Patient Position: Semi-Conley's)   Pulse 112   Temp 97.8 °F (36.6 °C) (Oral)   Resp 20   Ht 5' 9\" (1.753 m)   Wt 71 kg   SpO2 98%   BMI 23.11 kg/m²      Temp  Min: 96.1 °F (35.6 °C)  Max: 97.9 °F (36.6 °C)     Physical Exam:      General : Awake, NAD, appears comfortable. Pale-appearing  PERRL, No icterus, no oral thrush, tongue is dry  Neck supple, no LAD   Pulm: Clear to auscultation, no wheezes, no rales  GI: Abdomen is soft , non tender, no hepatosplenmegaly, BS normoactive  : No gustafson Catheter. No CVA or suprapubic tenderness.    CVS:  Pulse regular, S1, S2 normal, no m/g/r   Ext: No cyanosis, edema or clubbing  Skin: No  hien  Lines:  PIV  Neuro: No clonus or signs of upper motor neuron in the lower extremities exam. He does not have any hyper reflexia in the lower extremity       Labs       Recent Labs  Lab 07/11/17  0650 07/10/17  2153 07/10/17  1405   SODIUM 131*  --  129*   BUN 7  --  17   CREATININE 0.40*  --  0.86   GFRNA >90  --  >90   GFRA >90  --  >90   GLUCOSE 100*  --  98   CALCIUM 8.2*  --  9.1   ALBUMIN  --   --  2.6*   AST  --   --  40*   GPT  --   --  30   ALKPT  --   --  333*   BILIRUBIN  --   --  0.4   BNP  --  48  --        Recent Labs  Lab 07/11/17  0650 07/10/17  1405   WBC 24.1* 23.1*   HGB 7.2* 8.2*   HCT 22.3* 25.1*   * 776*   BAND  --  1     No results found   No results found for: VANCR, VANCT, VANCP      Patients's last HBA1C reading was   Hemoglobin A1C (%)   Date Value   07/10/2017 6.4 (H)         URINALYSIS  No results found     Microbiology:    Radiology/Imaging:   MRI LUMBAR SPINE   Final Result by Gee Muniz MD (07/11 1425)      MRI THORACIC SPINE   Final Result by Gee Muniz MD (07/11 1425)      CT Chest PE Imaging   Final Result by Mark Cortez MD (07/10 5708)   IMPRESSION:      1. No evidence of pulmonary embolus   2. The mediastinal right hilar lymphadenopathy appears improved over   previous exam   3. The large masslike lesion in the right lung base is markedly smaller   4. Patient appears to have a new metastatic lesion to the posterior aspect   of the T11 vertebral body and there is no pathological fractures of the   lateral aspect of the right sixth rib as outlined above         CT Head Brain   Final Result by Gee Muniz MD (07/10 9512)   IMPRESSION:    1. No acute intracranial findings.   2. No CT evidence for intracranial or calvarial metastatic disease.   3. Unchanged left TMJ advanced osteoarthritic degeneration.         MRI BRAIN    (Results Pending)          Assessment:     Leukocytosis is most likely due to demargination caused by corticosteroids patient was  taking dexamethasone prior to admission which has been continued  Bilateral lower extremity weakness most likely secondary to metastatic disease to his lumbar and thoracic spine   Pathological fractures of her right sixth and right seventh ribs  Metastatic urothelial bladder cancer  Generalized weakness  Hyponatremia      Plan & Recommendations:     MRI reviewed.   No antibiotics indicated at this time  Call me if any fevers more than 101  We'll follow closely         VAISHALI Mckeon MD  Infectious Diseases  Beeper/Pager number [phone]  Answering Service  #   [phone]  7/12/2017    5:21 PM    no

## 2023-02-07 LAB
ALBUMIN SERPL ELPH-MCNC: 3 G/DL — LOW (ref 3.5–5.2)
ALP SERPL-CCNC: 58 U/L — SIGNIFICANT CHANGE UP (ref 30–115)
ALT FLD-CCNC: 31 U/L — SIGNIFICANT CHANGE UP (ref 0–41)
ANION GAP SERPL CALC-SCNC: 8 MMOL/L — SIGNIFICANT CHANGE UP (ref 7–14)
AST SERPL-CCNC: 17 U/L — SIGNIFICANT CHANGE UP (ref 0–41)
BASOPHILS # BLD AUTO: 0.01 K/UL — SIGNIFICANT CHANGE UP (ref 0–0.2)
BASOPHILS NFR BLD AUTO: 0.1 % — SIGNIFICANT CHANGE UP (ref 0–1)
BILIRUB SERPL-MCNC: <0.2 MG/DL — SIGNIFICANT CHANGE UP (ref 0.2–1.2)
BUN SERPL-MCNC: 20 MG/DL — SIGNIFICANT CHANGE UP (ref 10–20)
CALCIUM SERPL-MCNC: 8.3 MG/DL — LOW (ref 8.4–10.4)
CHLORIDE SERPL-SCNC: 102 MMOL/L — SIGNIFICANT CHANGE UP (ref 98–110)
CO2 SERPL-SCNC: 32 MMOL/L — SIGNIFICANT CHANGE UP (ref 17–32)
CREAT SERPL-MCNC: 0.8 MG/DL — SIGNIFICANT CHANGE UP (ref 0.7–1.5)
EGFR: 74 ML/MIN/1.73M2 — SIGNIFICANT CHANGE UP
EOSINOPHIL # BLD AUTO: 0 K/UL — SIGNIFICANT CHANGE UP (ref 0–0.7)
EOSINOPHIL NFR BLD AUTO: 0 % — SIGNIFICANT CHANGE UP (ref 0–8)
GLUCOSE BLDC GLUCOMTR-MCNC: 114 MG/DL — HIGH (ref 70–99)
GLUCOSE BLDC GLUCOMTR-MCNC: 150 MG/DL — HIGH (ref 70–99)
GLUCOSE BLDC GLUCOMTR-MCNC: 185 MG/DL — HIGH (ref 70–99)
GLUCOSE BLDC GLUCOMTR-MCNC: 194 MG/DL — HIGH (ref 70–99)
GLUCOSE BLDC GLUCOMTR-MCNC: 212 MG/DL — HIGH (ref 70–99)
GLUCOSE SERPL-MCNC: 224 MG/DL — HIGH (ref 70–99)
HCT VFR BLD CALC: 26.9 % — LOW (ref 37–47)
HGB BLD-MCNC: 7.8 G/DL — LOW (ref 12–16)
IMM GRANULOCYTES NFR BLD AUTO: 2 % — HIGH (ref 0.1–0.3)
LYMPHOCYTES # BLD AUTO: 0.64 K/UL — LOW (ref 1.2–3.4)
LYMPHOCYTES # BLD AUTO: 8.5 % — LOW (ref 20.5–51.1)
MAGNESIUM SERPL-MCNC: 1.8 MG/DL — SIGNIFICANT CHANGE UP (ref 1.8–2.4)
MCHC RBC-ENTMCNC: 23.6 PG — LOW (ref 27–31)
MCHC RBC-ENTMCNC: 29 G/DL — LOW (ref 32–37)
MCV RBC AUTO: 81.3 FL — SIGNIFICANT CHANGE UP (ref 81–99)
MONOCYTES # BLD AUTO: 0.32 K/UL — SIGNIFICANT CHANGE UP (ref 0.1–0.6)
MONOCYTES NFR BLD AUTO: 4.2 % — SIGNIFICANT CHANGE UP (ref 1.7–9.3)
NEUTROPHILS # BLD AUTO: 6.43 K/UL — SIGNIFICANT CHANGE UP (ref 1.4–6.5)
NEUTROPHILS NFR BLD AUTO: 85.2 % — HIGH (ref 42.2–75.2)
NRBC # BLD: 0 /100 WBCS — SIGNIFICANT CHANGE UP (ref 0–0)
PLATELET # BLD AUTO: 246 K/UL — SIGNIFICANT CHANGE UP (ref 130–400)
POTASSIUM SERPL-MCNC: 4.2 MMOL/L — SIGNIFICANT CHANGE UP (ref 3.5–5)
POTASSIUM SERPL-SCNC: 4.2 MMOL/L — SIGNIFICANT CHANGE UP (ref 3.5–5)
PROT SERPL-MCNC: 5.1 G/DL — LOW (ref 6–8)
RBC # BLD: 3.31 M/UL — LOW (ref 4.2–5.4)
RBC # FLD: 18.7 % — HIGH (ref 11.5–14.5)
SODIUM SERPL-SCNC: 142 MMOL/L — SIGNIFICANT CHANGE UP (ref 135–146)
WBC # BLD: 7.55 K/UL — SIGNIFICANT CHANGE UP (ref 4.8–10.8)
WBC # FLD AUTO: 7.55 K/UL — SIGNIFICANT CHANGE UP (ref 4.8–10.8)

## 2023-02-07 PROCEDURE — 99233 SBSQ HOSP IP/OBS HIGH 50: CPT

## 2023-02-07 PROCEDURE — 99232 SBSQ HOSP IP/OBS MODERATE 35: CPT

## 2023-02-07 RX ORDER — ALBUTEROL 90 UG/1
2.5 AEROSOL, METERED ORAL EVERY 6 HOURS
Refills: 0 | Status: DISCONTINUED | OUTPATIENT
Start: 2023-02-07 | End: 2023-02-09

## 2023-02-07 RX ADMIN — TIOTROPIUM BROMIDE 2 PUFF(S): 18 CAPSULE ORAL; RESPIRATORY (INHALATION) at 10:45

## 2023-02-07 RX ADMIN — CARVEDILOL PHOSPHATE 12.5 MILLIGRAM(S): 80 CAPSULE, EXTENDED RELEASE ORAL at 09:16

## 2023-02-07 RX ADMIN — CLOPIDOGREL BISULFATE 75 MILLIGRAM(S): 75 TABLET, FILM COATED ORAL at 11:55

## 2023-02-07 RX ADMIN — Medication 5 MILLIGRAM(S): at 18:05

## 2023-02-07 RX ADMIN — ATORVASTATIN CALCIUM 40 MILLIGRAM(S): 80 TABLET, FILM COATED ORAL at 22:14

## 2023-02-07 RX ADMIN — ALBUTEROL 2.5 MILLIGRAM(S): 90 AEROSOL, METERED ORAL at 20:19

## 2023-02-07 RX ADMIN — Medication 1 MILLIGRAM(S): at 09:16

## 2023-02-07 RX ADMIN — Medication 5 MILLIGRAM(S): at 09:16

## 2023-02-07 RX ADMIN — Medication 1: at 11:54

## 2023-02-07 RX ADMIN — SERTRALINE 100 MILLIGRAM(S): 25 TABLET, FILM COATED ORAL at 11:55

## 2023-02-07 RX ADMIN — GABAPENTIN 600 MILLIGRAM(S): 400 CAPSULE ORAL at 09:16

## 2023-02-07 RX ADMIN — RISPERIDONE 1 MILLIGRAM(S): 4 TABLET ORAL at 22:14

## 2023-02-07 RX ADMIN — BUDESONIDE AND FORMOTEROL FUMARATE DIHYDRATE 2 PUFF(S): 160; 4.5 AEROSOL RESPIRATORY (INHALATION) at 10:44

## 2023-02-07 RX ADMIN — Medication 1: at 18:08

## 2023-02-07 RX ADMIN — Medication 1 MILLIGRAM(S): at 18:05

## 2023-02-07 RX ADMIN — ENOXAPARIN SODIUM 40 MILLIGRAM(S): 100 INJECTION SUBCUTANEOUS at 11:56

## 2023-02-07 RX ADMIN — Medication 40 MILLIGRAM(S): at 18:04

## 2023-02-07 RX ADMIN — Medication 81 MILLIGRAM(S): at 11:55

## 2023-02-07 RX ADMIN — PANTOPRAZOLE SODIUM 40 MILLIGRAM(S): 20 TABLET, DELAYED RELEASE ORAL at 09:16

## 2023-02-07 RX ADMIN — Medication 40 MILLIGRAM(S): at 06:20

## 2023-02-07 RX ADMIN — CARVEDILOL PHOSPHATE 12.5 MILLIGRAM(S): 80 CAPSULE, EXTENDED RELEASE ORAL at 18:05

## 2023-02-07 RX ADMIN — GABAPENTIN 600 MILLIGRAM(S): 400 CAPSULE ORAL at 18:05

## 2023-02-07 RX ADMIN — Medication 3 MILLILITER(S): at 08:53

## 2023-02-07 NOTE — PROGRESS NOTE ADULT - ATTENDING COMMENTS
81 yo female Pmhx ILD, Copd (sent on 2L NC last admission), Cad with stent x 1 2019, Dm, Htn, HFrEF (40-45%) presenting for SOB, dysuria.      pt was discharged last week after admitted for AHRF due to copd exacerbation with suspected new ILD, came for JAIN    #COPD exacerbation  #acute on chronic respiratory failure  #Recent dx ILD (New findings of UIP on CT 01/23/2023)  CXR stable diffuse interstitial opacities  DC with home o2 2 LNC since  last admission  Pulmonary on board  - hold abx, had abx course last admission and her procal is not elevated, CXR stable   - c/w solumedrol 40 BID  - Cont duonebs q6h and PRN  - s/p IV lasix yesterday     #Dysuria, resolved , ucx neg      #chronic HFmrEF, seen by cardio last admission, work up as outpt, c/w GDM  #CAD- s/p stent 2019  #HTN  - TTE (01/2023) ->EF 40-45%, G1DD,  Entire apex and basal and mid inferolateral wall are abnormal.  - during last admission cardio recommended OP nuclear stress test due to new changes on the TTE  - c/w carvedilol, losartan  - c/w DAPT   - follows with Dr Leyva    #anemia  - Hb 8.4 at baseline    #DM  - on trajenta at home  - monitor FS    #HLD  - c/w atorvastatin    DVT PPX, Lovenox    DNR/DNI    #Progress Note Handoff  Pending (specify): Cont IV steroids + duonebs  Family discussion: d/w pt and son regarding tx for COPD   Disposition: Home .
83 yo female Pmhx ILD, Copd (sent on 2L NC last admission), Cad with stent x 1 2019, Dm, Htn, HFrEF (40-45%) presenting for SOB, dysuria.    #COPD exacerbation  #acute on chronic respiratory failure  #Recent dx ILD (New findings of UIP on CT 01/23/2023)  CXR stable diffuse interstitial opacities  DC with home o2 on last admission  Pulmonary on board  - c/w ceftriaxone/azithromycin  - c/w solumedrol 40 BID  - Cont duonebs q6h and PRN  - Cont azithromycin  - One dose IV lasix 20 today  - f/u procalcitonin  - O/P pulmonary f/u for further ILD workup    #Dysuria  - reportedly patient's urine was positive  - complains of suprapubic tenderness and dysuria  - here UA negative  but f/u microscopic analysis  UCx negative  - DC ceftriaxone    #Mild acute on chronic HFrEF  #CAD- s/p stent 2019  #HTN  - TTE (01/2023) ->EF 40-45%, G1DD,  Entire apex and basal and mid inferolateral wall are abnormal.  - during last admission cardio recommended OP nuclear stress test due to new changes on the TTE  - c/w carvedilol, losartan  - c/w DAPT   - follows with Dr Leyva    #anemia  - Hb 8.4 at baseline  - tales IV iron occasionally -> follows with Dr hernandez    #DM  - on trajenta at home  - monitor FS    #HLD  - c/w atorvastatin    DVT PPX, Lovenox    #Progress Note Handoff  Pending (specify): Cont IV steroids + duonebs  Family discussion: d/w pt regarding tx for COPD and lasix dose today  Disposition: Home

## 2023-02-07 NOTE — PROGRESS NOTE ADULT - SUBJECTIVE AND OBJECTIVE BOX
Patient is a 82y old  Female who presents with a chief complaint of       Over Night Events:    No fevers noted   Still wheezing   Short of breath        ROS:  See HPI    PHYSICAL EXAM    ICU Vital Signs Last 24 Hrs  T(C): 36.1 (07 Feb 2023 05:24), Max: 36.6 (06 Feb 2023 13:32)  T(F): 96.9 (07 Feb 2023 05:24), Max: 97.9 (06 Feb 2023 13:32)  HR: 63 (07 Feb 2023 05:24) (63 - 90)  BP: 144/66 (07 Feb 2023 05:24) (140/65 - 144/66)  BP(mean): --  ABP: --  ABP(mean): --  RR: 18 (07 Feb 2023 05:24) (18 - 18)  SpO2: 97% (06 Feb 2023 18:52) (97% - 97%)    O2 Parameters below as of 06 Feb 2023 18:52  Patient On (Oxygen Delivery Method): nasal cannula  O2 Flow (L/min): 2          General: NAD   HEENT: CAL             Lymphatic system: No cervical LN   Lungs: Bilateral wheezing, rhonchi   Cardiovascular: Regular   Gastrointestinal: Soft, Positive BS  Extremities: No clubbing.  Moves extremities.  Full Range of motion   Skin: Warm, intact  Neurological: No motor or sensory deficit         LABS:                            7.8    7.55  )-----------( 246      ( 07 Feb 2023 01:30 )             26.9                                               02-07    142  |  102  |  20  ----------------------------<  224<H>  4.2   |  32  |  0.8    Ca    8.3<L>      07 Feb 2023 01:30  Mg     1.8     02-07    TPro  5.1<L>  /  Alb  3.0<L>  /  TBili  <0.2  /  DBili  x   /  AST  17  /  ALT  31  /  AlkPhos  58  02-07                                                                                           LIVER FUNCTIONS - ( 07 Feb 2023 01:30 )  Alb: 3.0 g/dL / Pro: 5.1 g/dL / ALK PHOS: 58 U/L / ALT: 31 U/L / AST: 17 U/L / GGT: x                                                  Culture - Urine (collected 04 Feb 2023 20:15)  Source: Clean Catch Clean Catch (Midstream)  Final Report (06 Feb 2023 00:34):    No growth    Culture - Blood (collected 04 Feb 2023 17:05)  Source: .Blood Blood-Peripheral  Preliminary Report (06 Feb 2023 06:01):    No growth to date.    Culture - Blood (collected 04 Feb 2023 17:05)  Source: .Blood Blood-Peripheral  Preliminary Report (06 Feb 2023 06:01):    No growth to date.                                                                                           MEDICATIONS  (STANDING):  albuterol/ipratropium for Nebulization 3 milliLiter(s) Nebulizer every 6 hours  ALPRAZolam 1 milliGRAM(s) Oral two times a day  aspirin enteric coated 81 milliGRAM(s) Oral daily  atorvastatin 40 milliGRAM(s) Oral at bedtime  budesonide 160 MICROgram(s)/formoterol 4.5 MICROgram(s) Inhaler 2 Puff(s) Inhalation two times a day  carvedilol 12.5 milliGRAM(s) Oral every 12 hours  clopidogrel Tablet 75 milliGRAM(s) Oral daily  dextrose 5%. 1000 milliLiter(s) (50 mL/Hr) IV Continuous <Continuous>  dextrose 5%. 1000 milliLiter(s) (100 mL/Hr) IV Continuous <Continuous>  dextrose 50% Injectable 25 Gram(s) IV Push once  dextrose 50% Injectable 12.5 Gram(s) IV Push once  dextrose 50% Injectable 25 Gram(s) IV Push once  enoxaparin Injectable 40 milliGRAM(s) SubCutaneous every 24 hours  gabapentin 600 milliGRAM(s) Oral two times a day  glucagon  Injectable 1 milliGRAM(s) IntraMuscular once  insulin lispro (ADMELOG) corrective regimen sliding scale   SubCutaneous Before meals and at bedtime  methylPREDNISolone sodium succinate Injectable 40 milliGRAM(s) IV Push every 12 hours  oxybutynin 5 milliGRAM(s) Oral two times a day  pantoprazole    Tablet 40 milliGRAM(s) Oral before breakfast  risperiDONE   Tablet 1 milliGRAM(s) Oral at bedtime  sertraline 100 milliGRAM(s) Oral daily  tiotropium 2.5 MICROgram(s) Inhaler 2 Puff(s) Inhalation daily    MEDICATIONS  (PRN):  acetaminophen     Tablet .. 650 milliGRAM(s) Oral every 6 hours PRN Temp greater or equal to 38C (100.4F), Mild Pain (1 - 3)  aluminum hydroxide/magnesium hydroxide/simethicone Suspension 30 milliLiter(s) Oral every 4 hours PRN Dyspepsia  dextrose Oral Gel 15 Gram(s) Oral once PRN Blood Glucose LESS THAN 70 milliGRAM(s)/deciliter  dicyclomine 20 milliGRAM(s) Oral three times a day before meals PRN abdominal pain  melatonin 3 milliGRAM(s) Oral at bedtime PRN Insomnia  ondansetron Injectable 4 milliGRAM(s) IV Push every 8 hours PRN Nausea and/or Vomiting

## 2023-02-07 NOTE — PROGRESS NOTE ADULT - SUBJECTIVE AND OBJECTIVE BOX
SUBJECTIVE:    Patient is a 82y old Female who presents with a chief complaint of   Currently admitted to medicine with the primary diagnosis of:    Today is hospital day 3d.     Overnight Events:     vitals stable overnight   this morning laying comfortably in bed    PAST MEDICAL & SURGICAL HISTORY  DM (diabetes mellitus)    CAD (coronary artery disease)    HLD (hyperlipidemia)    HTN (hypertension)    Neuropathy    COPD (chronic obstructive pulmonary disease)    Chronic systolic congestive heart failure    ALLERGIES:  No Known Allergies    MEDICATIONS:  STANDING MEDICATIONS  albuterol    0.083% 2.5 milliGRAM(s) Nebulizer every 6 hours  ALPRAZolam 1 milliGRAM(s) Oral two times a day  aspirin enteric coated 81 milliGRAM(s) Oral daily  atorvastatin 40 milliGRAM(s) Oral at bedtime  budesonide 160 MICROgram(s)/formoterol 4.5 MICROgram(s) Inhaler 2 Puff(s) Inhalation two times a day  carvedilol 12.5 milliGRAM(s) Oral every 12 hours  clopidogrel Tablet 75 milliGRAM(s) Oral daily  dextrose 5%. 1000 milliLiter(s) IV Continuous <Continuous>  dextrose 5%. 1000 milliLiter(s) IV Continuous <Continuous>  dextrose 50% Injectable 25 Gram(s) IV Push once  dextrose 50% Injectable 12.5 Gram(s) IV Push once  dextrose 50% Injectable 25 Gram(s) IV Push once  enoxaparin Injectable 40 milliGRAM(s) SubCutaneous every 24 hours  gabapentin 600 milliGRAM(s) Oral two times a day  glucagon  Injectable 1 milliGRAM(s) IntraMuscular once  insulin lispro (ADMELOG) corrective regimen sliding scale   SubCutaneous Before meals and at bedtime  methylPREDNISolone sodium succinate Injectable 20 milliGRAM(s) IV Push every 12 hours  oxybutynin 5 milliGRAM(s) Oral two times a day  pantoprazole    Tablet 40 milliGRAM(s) Oral before breakfast  risperiDONE   Tablet 1 milliGRAM(s) Oral at bedtime  sertraline 100 milliGRAM(s) Oral daily  tiotropium 2.5 MICROgram(s) Inhaler 2 Puff(s) Inhalation daily    PRN MEDICATIONS  acetaminophen     Tablet .. 650 milliGRAM(s) Oral every 6 hours PRN  aluminum hydroxide/magnesium hydroxide/simethicone Suspension 30 milliLiter(s) Oral every 4 hours PRN  dextrose Oral Gel 15 Gram(s) Oral once PRN  dicyclomine 20 milliGRAM(s) Oral three times a day before meals PRN  melatonin 3 milliGRAM(s) Oral at bedtime PRN  ondansetron Injectable 4 milliGRAM(s) IV Push every 8 hours PRN    VITALS:   ICU Vital Signs Last 24 Hrs  T(C): 35.7 (07 Feb 2023 14:13), Max: 36.4 (06 Feb 2023 22:18)  T(F): 96.3 (07 Feb 2023 14:13), Max: 97.6 (06 Feb 2023 22:18)  HR: 73 (07 Feb 2023 14:13) (63 - 90)  BP: 142/65 (07 Feb 2023 14:13) (140/65 - 144/66)  BP(mean): --  ABP: --  ABP(mean): --  RR: 18 (07 Feb 2023 14:13) (18 - 18)  SpO2: 97% (07 Feb 2023 14:13) (97% - 98%)    O2 Parameters below as of 07 Feb 2023 08:00  Patient On (Oxygen Delivery Method): nasal cannula  O2 Flow (L/min): 2          LABS:                        7.8    7.55  )-----------( 246      ( 07 Feb 2023 01:30 )             26.9     02-07    142  |  102  |  20  ----------------------------<  224<H>  4.2   |  32  |  0.8    Ca    8.3<L>      07 Feb 2023 01:30  Mg     1.8     02-07    TPro  5.1<L>  /  Alb  3.0<L>  /  TBili  <0.2  /  DBili  x   /  AST  17  /  ALT  31  /  AlkPhos  58  02-07              Culture - Urine (collected 04 Feb 2023 20:15)  Source: Clean Catch Clean Catch (Midstream)  Final Report (06 Feb 2023 00:34):    No growth    Culture - Blood (collected 04 Feb 2023 17:05)  Source: .Blood Blood-Peripheral  Preliminary Report (06 Feb 2023 06:01):    No growth to date.    Culture - Blood (collected 04 Feb 2023 17:05)  Source: .Blood Blood-Peripheral  Preliminary Report (06 Feb 2023 06:01):    No growth to date.            RADIOLOGY:  < from: Xray Chest 1 View-PORTABLE IMMEDIATE (02.04.23 @ 16:31) >  Stable diffuse interstitial opacities. Blunted left costophrenic angle.   No pneumothorax    < end of copied text >    PHYSICAL EXAM:  GEN: laying in bed comfortably, no acute distress  LUNGS: Room air, nonlabored breathing, no accessory muscle use  HEART: s1 s2 non murmur  ABD: nontender nondistended  EXT: no lower extremity edema  NEURO: ao person place time SUBJECTIVE:    Patient is a 82y old Female who presents with a chief complaint of   Currently admitted to medicine with the primary diagnosis of:    Today is hospital day 3d.     Overnight Events:     vitals stable overnight   this morning laying comfortably in bed, SOB improved  denies fever, chills, syncope, seizure, headache,  abd pain, N/V/D, urinary symptoms, legs swelling,    PAST MEDICAL & SURGICAL HISTORY  DM (diabetes mellitus)    CAD (coronary artery disease)    HLD (hyperlipidemia)    HTN (hypertension)    Neuropathy    COPD (chronic obstructive pulmonary disease)    Chronic systolic congestive heart failure    ALLERGIES:  No Known Allergies    MEDICATIONS:  STANDING MEDICATIONS  albuterol    0.083% 2.5 milliGRAM(s) Nebulizer every 6 hours  ALPRAZolam 1 milliGRAM(s) Oral two times a day  aspirin enteric coated 81 milliGRAM(s) Oral daily  atorvastatin 40 milliGRAM(s) Oral at bedtime  budesonide 160 MICROgram(s)/formoterol 4.5 MICROgram(s) Inhaler 2 Puff(s) Inhalation two times a day  carvedilol 12.5 milliGRAM(s) Oral every 12 hours  clopidogrel Tablet 75 milliGRAM(s) Oral daily  dextrose 5%. 1000 milliLiter(s) IV Continuous <Continuous>  dextrose 5%. 1000 milliLiter(s) IV Continuous <Continuous>  dextrose 50% Injectable 25 Gram(s) IV Push once  dextrose 50% Injectable 12.5 Gram(s) IV Push once  dextrose 50% Injectable 25 Gram(s) IV Push once  enoxaparin Injectable 40 milliGRAM(s) SubCutaneous every 24 hours  gabapentin 600 milliGRAM(s) Oral two times a day  glucagon  Injectable 1 milliGRAM(s) IntraMuscular once  insulin lispro (ADMELOG) corrective regimen sliding scale   SubCutaneous Before meals and at bedtime  methylPREDNISolone sodium succinate Injectable 20 milliGRAM(s) IV Push every 12 hours  oxybutynin 5 milliGRAM(s) Oral two times a day  pantoprazole    Tablet 40 milliGRAM(s) Oral before breakfast  risperiDONE   Tablet 1 milliGRAM(s) Oral at bedtime  sertraline 100 milliGRAM(s) Oral daily  tiotropium 2.5 MICROgram(s) Inhaler 2 Puff(s) Inhalation daily    PRN MEDICATIONS  acetaminophen     Tablet .. 650 milliGRAM(s) Oral every 6 hours PRN  aluminum hydroxide/magnesium hydroxide/simethicone Suspension 30 milliLiter(s) Oral every 4 hours PRN  dextrose Oral Gel 15 Gram(s) Oral once PRN  dicyclomine 20 milliGRAM(s) Oral three times a day before meals PRN  melatonin 3 milliGRAM(s) Oral at bedtime PRN  ondansetron Injectable 4 milliGRAM(s) IV Push every 8 hours PRN    VITALS:   ICU Vital Signs Last 24 Hrs  T(C): 35.7 (07 Feb 2023 14:13), Max: 36.4 (06 Feb 2023 22:18)  T(F): 96.3 (07 Feb 2023 14:13), Max: 97.6 (06 Feb 2023 22:18)  HR: 73 (07 Feb 2023 14:13) (63 - 90)  BP: 142/65 (07 Feb 2023 14:13) (140/65 - 144/66)  BP(mean): --  ABP: --  ABP(mean): --  RR: 18 (07 Feb 2023 14:13) (18 - 18)  SpO2: 97% (07 Feb 2023 14:13) (97% - 98%)    O2 Parameters below as of 07 Feb 2023 08:00  Patient On (Oxygen Delivery Method): nasal cannula  O2 Flow (L/min): 2          LABS:                        7.8    7.55  )-----------( 246      ( 07 Feb 2023 01:30 )             26.9     02-07    142  |  102  |  20  ----------------------------<  224<H>  4.2   |  32  |  0.8    Ca    8.3<L>      07 Feb 2023 01:30  Mg     1.8     02-07    TPro  5.1<L>  /  Alb  3.0<L>  /  TBili  <0.2  /  DBili  x   /  AST  17  /  ALT  31  /  AlkPhos  58  02-07              Culture - Urine (collected 04 Feb 2023 20:15)  Source: Clean Catch Clean Catch (Midstream)  Final Report (06 Feb 2023 00:34):    No growth    Culture - Blood (collected 04 Feb 2023 17:05)  Source: .Blood Blood-Peripheral  Preliminary Report (06 Feb 2023 06:01):    No growth to date.    Culture - Blood (collected 04 Feb 2023 17:05)  Source: .Blood Blood-Peripheral  Preliminary Report (06 Feb 2023 06:01):    No growth to date.            RADIOLOGY:  < from: Xray Chest 1 View-PORTABLE IMMEDIATE (02.04.23 @ 16:31) >  Stable diffuse interstitial opacities. Blunted left costophrenic angle.   No pneumothorax    < end of copied text >    PHYSICAL EXAM:  GEN: laying in bed comfortably, no acute distress  LUNGS: Room air, nonlabored breathing, no accessory muscle use, +ve exp wheezing  HEART: s1 s2 non murmur  ABD: nontender nondistended  EXT: no lower extremity edema  NEURO: aao x4, moves all ext,

## 2023-02-08 LAB
ALBUMIN SERPL ELPH-MCNC: 3.1 G/DL — LOW (ref 3.5–5.2)
ALP SERPL-CCNC: 55 U/L — SIGNIFICANT CHANGE UP (ref 30–115)
ALT FLD-CCNC: 29 U/L — SIGNIFICANT CHANGE UP (ref 0–41)
ANION GAP SERPL CALC-SCNC: 8 MMOL/L — SIGNIFICANT CHANGE UP (ref 7–14)
AST SERPL-CCNC: 18 U/L — SIGNIFICANT CHANGE UP (ref 0–41)
BASOPHILS # BLD AUTO: 0.03 K/UL — SIGNIFICANT CHANGE UP (ref 0–0.2)
BASOPHILS NFR BLD AUTO: 0.4 % — SIGNIFICANT CHANGE UP (ref 0–1)
BILIRUB SERPL-MCNC: <0.2 MG/DL — SIGNIFICANT CHANGE UP (ref 0.2–1.2)
BUN SERPL-MCNC: 18 MG/DL — SIGNIFICANT CHANGE UP (ref 10–20)
CALCIUM SERPL-MCNC: 8.7 MG/DL — SIGNIFICANT CHANGE UP (ref 8.4–10.5)
CHLORIDE SERPL-SCNC: 104 MMOL/L — SIGNIFICANT CHANGE UP (ref 98–110)
CO2 SERPL-SCNC: 31 MMOL/L — SIGNIFICANT CHANGE UP (ref 17–32)
CREAT SERPL-MCNC: 0.7 MG/DL — SIGNIFICANT CHANGE UP (ref 0.7–1.5)
EGFR: 87 ML/MIN/1.73M2 — SIGNIFICANT CHANGE UP
EOSINOPHIL # BLD AUTO: 0 K/UL — SIGNIFICANT CHANGE UP (ref 0–0.7)
EOSINOPHIL NFR BLD AUTO: 0 % — SIGNIFICANT CHANGE UP (ref 0–8)
GLUCOSE BLDC GLUCOMTR-MCNC: 119 MG/DL — HIGH (ref 70–99)
GLUCOSE BLDC GLUCOMTR-MCNC: 144 MG/DL — HIGH (ref 70–99)
GLUCOSE BLDC GLUCOMTR-MCNC: 200 MG/DL — HIGH (ref 70–99)
GLUCOSE BLDC GLUCOMTR-MCNC: 226 MG/DL — HIGH (ref 70–99)
GLUCOSE SERPL-MCNC: 156 MG/DL — HIGH (ref 70–99)
HCT VFR BLD CALC: 28.8 % — LOW (ref 37–47)
HGB BLD-MCNC: 8.3 G/DL — LOW (ref 12–16)
IMM GRANULOCYTES NFR BLD AUTO: 2.6 % — HIGH (ref 0.1–0.3)
LYMPHOCYTES # BLD AUTO: 1.24 K/UL — SIGNIFICANT CHANGE UP (ref 1.2–3.4)
LYMPHOCYTES # BLD AUTO: 14.8 % — LOW (ref 20.5–51.1)
MAGNESIUM SERPL-MCNC: 1.8 MG/DL — SIGNIFICANT CHANGE UP (ref 1.8–2.4)
MCHC RBC-ENTMCNC: 23.2 PG — LOW (ref 27–31)
MCHC RBC-ENTMCNC: 28.8 G/DL — LOW (ref 32–37)
MCV RBC AUTO: 80.7 FL — LOW (ref 81–99)
MONOCYTES # BLD AUTO: 0.38 K/UL — SIGNIFICANT CHANGE UP (ref 0.1–0.6)
MONOCYTES NFR BLD AUTO: 4.5 % — SIGNIFICANT CHANGE UP (ref 1.7–9.3)
NEUTROPHILS # BLD AUTO: 6.49 K/UL — SIGNIFICANT CHANGE UP (ref 1.4–6.5)
NEUTROPHILS NFR BLD AUTO: 77.7 % — HIGH (ref 42.2–75.2)
NRBC # BLD: 0 /100 WBCS — SIGNIFICANT CHANGE UP (ref 0–0)
PLATELET # BLD AUTO: 252 K/UL — SIGNIFICANT CHANGE UP (ref 130–400)
POTASSIUM SERPL-MCNC: 4.7 MMOL/L — SIGNIFICANT CHANGE UP (ref 3.5–5)
POTASSIUM SERPL-SCNC: 4.7 MMOL/L — SIGNIFICANT CHANGE UP (ref 3.5–5)
PROT SERPL-MCNC: 5 G/DL — LOW (ref 6–8)
RBC # BLD: 3.57 M/UL — LOW (ref 4.2–5.4)
RBC # FLD: 18.6 % — HIGH (ref 11.5–14.5)
SODIUM SERPL-SCNC: 143 MMOL/L — SIGNIFICANT CHANGE UP (ref 135–146)
WBC # BLD: 8.36 K/UL — SIGNIFICANT CHANGE UP (ref 4.8–10.8)
WBC # FLD AUTO: 8.36 K/UL — SIGNIFICANT CHANGE UP (ref 4.8–10.8)

## 2023-02-08 PROCEDURE — 99232 SBSQ HOSP IP/OBS MODERATE 35: CPT

## 2023-02-08 RX ORDER — LOSARTAN POTASSIUM 100 MG/1
25 TABLET, FILM COATED ORAL DAILY
Refills: 0 | Status: DISCONTINUED | OUTPATIENT
Start: 2023-02-08 | End: 2023-02-09

## 2023-02-08 RX ADMIN — ALBUTEROL 2.5 MILLIGRAM(S): 90 AEROSOL, METERED ORAL at 14:32

## 2023-02-08 RX ADMIN — Medication 81 MILLIGRAM(S): at 11:13

## 2023-02-08 RX ADMIN — Medication 1 MILLIGRAM(S): at 17:25

## 2023-02-08 RX ADMIN — CARVEDILOL PHOSPHATE 12.5 MILLIGRAM(S): 80 CAPSULE, EXTENDED RELEASE ORAL at 17:23

## 2023-02-08 RX ADMIN — ATORVASTATIN CALCIUM 40 MILLIGRAM(S): 80 TABLET, FILM COATED ORAL at 22:02

## 2023-02-08 RX ADMIN — Medication 5 MILLIGRAM(S): at 17:24

## 2023-02-08 RX ADMIN — CLOPIDOGREL BISULFATE 75 MILLIGRAM(S): 75 TABLET, FILM COATED ORAL at 11:14

## 2023-02-08 RX ADMIN — GABAPENTIN 600 MILLIGRAM(S): 400 CAPSULE ORAL at 06:16

## 2023-02-08 RX ADMIN — Medication 2: at 13:05

## 2023-02-08 RX ADMIN — ALBUTEROL 2.5 MILLIGRAM(S): 90 AEROSOL, METERED ORAL at 20:25

## 2023-02-08 RX ADMIN — RISPERIDONE 1 MILLIGRAM(S): 4 TABLET ORAL at 22:01

## 2023-02-08 RX ADMIN — Medication 650 MILLIGRAM(S): at 12:30

## 2023-02-08 RX ADMIN — Medication 5 MILLIGRAM(S): at 06:15

## 2023-02-08 RX ADMIN — SERTRALINE 100 MILLIGRAM(S): 25 TABLET, FILM COATED ORAL at 11:14

## 2023-02-08 RX ADMIN — ENOXAPARIN SODIUM 40 MILLIGRAM(S): 100 INJECTION SUBCUTANEOUS at 12:38

## 2023-02-08 RX ADMIN — Medication 650 MILLIGRAM(S): at 11:59

## 2023-02-08 RX ADMIN — CARVEDILOL PHOSPHATE 12.5 MILLIGRAM(S): 80 CAPSULE, EXTENDED RELEASE ORAL at 06:15

## 2023-02-08 RX ADMIN — Medication 1 MILLIGRAM(S): at 06:18

## 2023-02-08 RX ADMIN — ALBUTEROL 2.5 MILLIGRAM(S): 90 AEROSOL, METERED ORAL at 07:54

## 2023-02-08 RX ADMIN — Medication 20 MILLIGRAM(S): at 17:25

## 2023-02-08 RX ADMIN — GABAPENTIN 600 MILLIGRAM(S): 400 CAPSULE ORAL at 17:23

## 2023-02-08 RX ADMIN — PANTOPRAZOLE SODIUM 40 MILLIGRAM(S): 20 TABLET, DELAYED RELEASE ORAL at 06:15

## 2023-02-08 RX ADMIN — Medication 1: at 22:02

## 2023-02-08 RX ADMIN — LOSARTAN POTASSIUM 25 MILLIGRAM(S): 100 TABLET, FILM COATED ORAL at 18:39

## 2023-02-08 RX ADMIN — Medication 40 MILLIGRAM(S): at 06:16

## 2023-02-08 NOTE — PROGRESS NOTE ADULT - SUBJECTIVE AND OBJECTIVE BOX
Pt seen and examined at bedside.      VITAL SIGNS (Last 24 hrs):  T(C): 36.6 (02-08-23 @ 12:26), Max: 36.6 (02-08-23 @ 12:26)  HR: 70 (02-08-23 @ 12:26) (56 - 73)  BP: 171/67 (02-08-23 @ 12:26) (154/70 - 171/67)  RR: 18 (02-08-23 @ 12:26) (18 - 18)  SpO2: --  Wt(kg): --  Daily     Daily     I&O's Summary      PHYSICAL EXAM:  GENERAL: NAD  HEAD:  Atraumatic, Normocephalic  EYES: conjunctiva and sclera clear  NECK: Supple, No JVD  CHEST/LUNG: b/l crackles   HEART: Regular rate and rhythm; No murmurs, rubs, or gallops  ABDOMEN: Soft, Nontender, Nondistended; Bowel sounds present  EXTREMITIES:  2+ Peripheral Pulses, No clubbing, cyanosis, or edema  PSYCH: AAOx3  NEUROLOGY: non-focal  SKIN: No rashes or lesions    Labs Reviewed        CBC Full  -  ( 08 Feb 2023 02:41 )  WBC Count : 8.36 K/uL  Hemoglobin : 8.3 g/dL  Hematocrit : 28.8 %  Platelet Count - Automated : 252 K/uL  Mean Cell Volume : 80.7 fL  Mean Cell Hemoglobin : 23.2 pg  Mean Cell Hemoglobin Concentration : 28.8 g/dL  Auto Neutrophil # : 6.49 K/uL  Auto Lymphocyte # : 1.24 K/uL  Auto Monocyte # : 0.38 K/uL  Auto Eosinophil # : 0.00 K/uL  Auto Basophil # : 0.03 K/uL  Auto Neutrophil % : 77.7 %  Auto Lymphocyte % : 14.8 %  Auto Monocyte % : 4.5 %  Auto Eosinophil % : 0.0 %  Auto Basophil % : 0.4 %    BMP:    02-08 @ 02:41    Blood Urea Nitrogen - 18  Calcium - 8.7  Carbond Dioxide - 31  Chloride - 104  Creatinine - 0.7  Glucose - 156  Potassium - 4.7  Sodium - 143          PT/INR - ( 04 Feb 2023 17:10 )   PT: 14.60 sec;   INR: 1.27 ratio         PTT - ( 04 Feb 2023 17:10 )  PTT:27.1 sec  Urine Culture:  02-04 @ 20:15 Urine culture: --    Culture Results:   No growth  Method Type: --  Organism: --  Organism Identification: --  Specimen Source: Clean Catch Clean Catch (Midstream)  02-04 @ 17:05 Urine culture: --    Culture Results:   No growth to date.  Method Type: --  Organism: --  Organism Identification: --  Specimen Source: .Blood Blood-Peripheral            MEDICATIONS  (STANDING):  albuterol    0.083% 2.5 milliGRAM(s) Nebulizer every 6 hours  ALPRAZolam 1 milliGRAM(s) Oral two times a day  aspirin enteric coated 81 milliGRAM(s) Oral daily  atorvastatin 40 milliGRAM(s) Oral at bedtime  budesonide 160 MICROgram(s)/formoterol 4.5 MICROgram(s) Inhaler 2 Puff(s) Inhalation two times a day  carvedilol 12.5 milliGRAM(s) Oral every 12 hours  clopidogrel Tablet 75 milliGRAM(s) Oral daily  dextrose 5%. 1000 milliLiter(s) (50 mL/Hr) IV Continuous <Continuous>  dextrose 5%. 1000 milliLiter(s) (100 mL/Hr) IV Continuous <Continuous>  dextrose 50% Injectable 25 Gram(s) IV Push once  dextrose 50% Injectable 12.5 Gram(s) IV Push once  dextrose 50% Injectable 25 Gram(s) IV Push once  enoxaparin Injectable 40 milliGRAM(s) SubCutaneous every 24 hours  gabapentin 600 milliGRAM(s) Oral two times a day  glucagon  Injectable 1 milliGRAM(s) IntraMuscular once  insulin lispro (ADMELOG) corrective regimen sliding scale   SubCutaneous Before meals and at bedtime  oxybutynin 5 milliGRAM(s) Oral two times a day  pantoprazole    Tablet 40 milliGRAM(s) Oral before breakfast  predniSONE   Tablet 20 milliGRAM(s) Oral every 12 hours  risperiDONE   Tablet 1 milliGRAM(s) Oral at bedtime  sertraline 100 milliGRAM(s) Oral daily  tiotropium 2.5 MICROgram(s) Inhaler 2 Puff(s) Inhalation daily    MEDICATIONS  (PRN):  acetaminophen     Tablet .. 650 milliGRAM(s) Oral every 6 hours PRN Temp greater or equal to 38C (100.4F), Mild Pain (1 - 3)  aluminum hydroxide/magnesium hydroxide/simethicone Suspension 30 milliLiter(s) Oral every 4 hours PRN Dyspepsia  dextrose Oral Gel 15 Gram(s) Oral once PRN Blood Glucose LESS THAN 70 milliGRAM(s)/deciliter  dicyclomine 20 milliGRAM(s) Oral three times a day before meals PRN abdominal pain  melatonin 3 milliGRAM(s) Oral at bedtime PRN Insomnia  ondansetron Injectable 4 milliGRAM(s) IV Push every 8 hours PRN Nausea and/or Vomiting

## 2023-02-08 NOTE — PROGRESS NOTE ADULT - SUBJECTIVE AND OBJECTIVE BOX
SUBJECTIVE:    Patient is a 82y old Female who presents with a chief complaint of   Currently admitted to medicine with the primary diagnosis of:    Today is hospital day 4d.     Overnight Events:         PAST MEDICAL & SURGICAL HISTORY  DM (diabetes mellitus)    CAD (coronary artery disease)    HLD (hyperlipidemia)    HTN (hypertension)    Neuropathy    COPD (chronic obstructive pulmonary disease)    Chronic systolic congestive heart failure        SOCIAL HISTORY:  Smoking history:  Alcohol Use;  Illicit Drug Use:    ALLERGIES:  No Known Allergies    MEDICATIONS:  STANDING MEDICATIONS  albuterol    0.083% 2.5 milliGRAM(s) Nebulizer every 6 hours  ALPRAZolam 1 milliGRAM(s) Oral two times a day  aspirin enteric coated 81 milliGRAM(s) Oral daily  atorvastatin 40 milliGRAM(s) Oral at bedtime  budesonide 160 MICROgram(s)/formoterol 4.5 MICROgram(s) Inhaler 2 Puff(s) Inhalation two times a day  carvedilol 12.5 milliGRAM(s) Oral every 12 hours  clopidogrel Tablet 75 milliGRAM(s) Oral daily  dextrose 5%. 1000 milliLiter(s) IV Continuous <Continuous>  dextrose 5%. 1000 milliLiter(s) IV Continuous <Continuous>  dextrose 50% Injectable 25 Gram(s) IV Push once  dextrose 50% Injectable 12.5 Gram(s) IV Push once  dextrose 50% Injectable 25 Gram(s) IV Push once  enoxaparin Injectable 40 milliGRAM(s) SubCutaneous every 24 hours  gabapentin 600 milliGRAM(s) Oral two times a day  glucagon  Injectable 1 milliGRAM(s) IntraMuscular once  insulin lispro (ADMELOG) corrective regimen sliding scale   SubCutaneous Before meals and at bedtime  oxybutynin 5 milliGRAM(s) Oral two times a day  pantoprazole    Tablet 40 milliGRAM(s) Oral before breakfast  predniSONE   Tablet 20 milliGRAM(s) Oral every 12 hours  risperiDONE   Tablet 1 milliGRAM(s) Oral at bedtime  sertraline 100 milliGRAM(s) Oral daily  tiotropium 2.5 MICROgram(s) Inhaler 2 Puff(s) Inhalation daily    PRN MEDICATIONS  acetaminophen     Tablet .. 650 milliGRAM(s) Oral every 6 hours PRN  aluminum hydroxide/magnesium hydroxide/simethicone Suspension 30 milliLiter(s) Oral every 4 hours PRN  dextrose Oral Gel 15 Gram(s) Oral once PRN  dicyclomine 20 milliGRAM(s) Oral three times a day before meals PRN  melatonin 3 milliGRAM(s) Oral at bedtime PRN  ondansetron Injectable 4 milliGRAM(s) IV Push every 8 hours PRN    VITALS:   ICU Vital Signs Last 24 Hrs  T(C): 36.6 (08 Feb 2023 12:26), Max: 36.6 (08 Feb 2023 12:26)  T(F): 97.9 (08 Feb 2023 12:26), Max: 97.9 (08 Feb 2023 12:26)  HR: 70 (08 Feb 2023 12:26) (56 - 73)  BP: 171/67 (08 Feb 2023 12:26) (154/70 - 171/67)  BP(mean): --  ABP: --  ABP(mean): --  RR: 18 (08 Feb 2023 12:26) (18 - 18)  SpO2: --        LABS:                        8.3    8.36  )-----------( 252      ( 08 Feb 2023 02:41 )             28.8     02-08    143  |  104  |  18  ----------------------------<  156<H>  4.7   |  31  |  0.7    Ca    8.7      08 Feb 2023 02:41  Mg     1.8     02-08    TPro  5.0<L>  /  Alb  3.1<L>  /  TBili  <0.2  /  DBili  x   /  AST  18  /  ALT  29  /  AlkPhos  55  02-08                    RADIOLOGY:  < from: Xray Chest 1 View-PORTABLE IMMEDIATE (02.04.23 @ 16:31) >  Stable diffuse interstitial opacities. Blunted left costophrenic angle.   No pneumothorax    < end of copied text >    PHYSICAL EXAM:  GEN: laying in bed, no acute distress  LUNGS: congested, sputum production  HEART: s1 s2 no murmor  ABD: nontender  EXT: no lower extremity edema  NEURO: ao3

## 2023-02-08 NOTE — PROGRESS NOTE ADULT - SUBJECTIVE AND OBJECTIVE BOX
Patient is a 82y old  Female who presents with a chief complaint of       Over Night Events:    No fevers   On O2 NC   Less wheezing         ROS:  See HPI    PHYSICAL EXAM    ICU Vital Signs Last 24 Hrs  T(C): 36.1 (08 Feb 2023 05:01), Max: 36.2 (07 Feb 2023 21:25)  T(F): 96.9 (08 Feb 2023 05:01), Max: 97.1 (07 Feb 2023 21:25)  HR: 56 (08 Feb 2023 05:01) (56 - 73)  BP: 158/71 (08 Feb 2023 05:01) (142/65 - 158/71)  BP(mean): --  ABP: --  ABP(mean): --  RR: 18 (08 Feb 2023 05:01) (18 - 18)  SpO2: 97% (07 Feb 2023 14:13) (97% - 97%)        General: NAD   HEENT: CAL             Lymphatic system: No cervical LN   Lungs: Bilateral BS, wheezing improved   Cardiovascular: Regular   Gastrointestinal: Soft, Positive BS  Extremities: No clubbing.  Moves extremities.  Full Range of motion   Skin: Warm, intact  Neurological: No motor or sensory deficit         LABS:                            8.3    8.36  )-----------( 252      ( 08 Feb 2023 02:41 )             28.8                                               02-08    143  |  104  |  18  ----------------------------<  156<H>  4.7   |  31  |  0.7    Ca    8.7      08 Feb 2023 02:41  Mg     1.8     02-08    TPro  5.0<L>  /  Alb  3.1<L>  /  TBili  <0.2  /  DBili  x   /  AST  18  /  ALT  29  /  AlkPhos  55  02-08                                                                                           LIVER FUNCTIONS - ( 08 Feb 2023 02:41 )  Alb: 3.1 g/dL / Pro: 5.0 g/dL / ALK PHOS: 55 U/L / ALT: 29 U/L / AST: 18 U/L / GGT: x                                                                                                                                       MEDICATIONS  (STANDING):  albuterol    0.083% 2.5 milliGRAM(s) Nebulizer every 6 hours  ALPRAZolam 1 milliGRAM(s) Oral two times a day  aspirin enteric coated 81 milliGRAM(s) Oral daily  atorvastatin 40 milliGRAM(s) Oral at bedtime  budesonide 160 MICROgram(s)/formoterol 4.5 MICROgram(s) Inhaler 2 Puff(s) Inhalation two times a day  carvedilol 12.5 milliGRAM(s) Oral every 12 hours  clopidogrel Tablet 75 milliGRAM(s) Oral daily  dextrose 5%. 1000 milliLiter(s) (50 mL/Hr) IV Continuous <Continuous>  dextrose 5%. 1000 milliLiter(s) (100 mL/Hr) IV Continuous <Continuous>  dextrose 50% Injectable 25 Gram(s) IV Push once  dextrose 50% Injectable 12.5 Gram(s) IV Push once  dextrose 50% Injectable 25 Gram(s) IV Push once  enoxaparin Injectable 40 milliGRAM(s) SubCutaneous every 24 hours  gabapentin 600 milliGRAM(s) Oral two times a day  glucagon  Injectable 1 milliGRAM(s) IntraMuscular once  insulin lispro (ADMELOG) corrective regimen sliding scale   SubCutaneous Before meals and at bedtime  methylPREDNISolone sodium succinate Injectable 40 milliGRAM(s) IV Push every 12 hours  oxybutynin 5 milliGRAM(s) Oral two times a day  pantoprazole    Tablet 40 milliGRAM(s) Oral before breakfast  risperiDONE   Tablet 1 milliGRAM(s) Oral at bedtime  sertraline 100 milliGRAM(s) Oral daily  tiotropium 2.5 MICROgram(s) Inhaler 2 Puff(s) Inhalation daily    MEDICATIONS  (PRN):  acetaminophen     Tablet .. 650 milliGRAM(s) Oral every 6 hours PRN Temp greater or equal to 38C (100.4F), Mild Pain (1 - 3)  aluminum hydroxide/magnesium hydroxide/simethicone Suspension 30 milliLiter(s) Oral every 4 hours PRN Dyspepsia  dextrose Oral Gel 15 Gram(s) Oral once PRN Blood Glucose LESS THAN 70 milliGRAM(s)/deciliter  dicyclomine 20 milliGRAM(s) Oral three times a day before meals PRN abdominal pain  melatonin 3 milliGRAM(s) Oral at bedtime PRN Insomnia  ondansetron Injectable 4 milliGRAM(s) IV Push every 8 hours PRN Nausea and/or Vomiting

## 2023-02-09 ENCOUNTER — TRANSCRIPTION ENCOUNTER (OUTPATIENT)
Age: 83
End: 2023-02-09

## 2023-02-09 VITALS
TEMPERATURE: 98 F | DIASTOLIC BLOOD PRESSURE: 77 MMHG | SYSTOLIC BLOOD PRESSURE: 174 MMHG | HEART RATE: 67 BPM | RESPIRATION RATE: 18 BRPM | OXYGEN SATURATION: 99 %

## 2023-02-09 LAB
ALBUMIN SERPL ELPH-MCNC: 3 G/DL — LOW (ref 3.5–5.2)
ALP SERPL-CCNC: 51 U/L — SIGNIFICANT CHANGE UP (ref 30–115)
ALT FLD-CCNC: 26 U/L — SIGNIFICANT CHANGE UP (ref 0–41)
ANION GAP SERPL CALC-SCNC: 8 MMOL/L — SIGNIFICANT CHANGE UP (ref 7–14)
AST SERPL-CCNC: 13 U/L — SIGNIFICANT CHANGE UP (ref 0–41)
BASOPHILS # BLD AUTO: 0.02 K/UL — SIGNIFICANT CHANGE UP (ref 0–0.2)
BASOPHILS NFR BLD AUTO: 0.2 % — SIGNIFICANT CHANGE UP (ref 0–1)
BILIRUB SERPL-MCNC: <0.2 MG/DL — SIGNIFICANT CHANGE UP (ref 0.2–1.2)
BUN SERPL-MCNC: 23 MG/DL — HIGH (ref 10–20)
CALCIUM SERPL-MCNC: 8.6 MG/DL — SIGNIFICANT CHANGE UP (ref 8.4–10.5)
CHLORIDE SERPL-SCNC: 101 MMOL/L — SIGNIFICANT CHANGE UP (ref 98–110)
CO2 SERPL-SCNC: 30 MMOL/L — SIGNIFICANT CHANGE UP (ref 17–32)
CREAT SERPL-MCNC: 0.7 MG/DL — SIGNIFICANT CHANGE UP (ref 0.7–1.5)
EGFR: 87 ML/MIN/1.73M2 — SIGNIFICANT CHANGE UP
EOSINOPHIL # BLD AUTO: 0 K/UL — SIGNIFICANT CHANGE UP (ref 0–0.7)
EOSINOPHIL NFR BLD AUTO: 0 % — SIGNIFICANT CHANGE UP (ref 0–8)
GLUCOSE BLDC GLUCOMTR-MCNC: 119 MG/DL — HIGH (ref 70–99)
GLUCOSE BLDC GLUCOMTR-MCNC: 185 MG/DL — HIGH (ref 70–99)
GLUCOSE SERPL-MCNC: 228 MG/DL — HIGH (ref 70–99)
HCT VFR BLD CALC: 28.4 % — LOW (ref 37–47)
HGB BLD-MCNC: 8.2 G/DL — LOW (ref 12–16)
IMM GRANULOCYTES NFR BLD AUTO: 3.9 % — HIGH (ref 0.1–0.3)
LYMPHOCYTES # BLD AUTO: 1.21 K/UL — SIGNIFICANT CHANGE UP (ref 1.2–3.4)
LYMPHOCYTES # BLD AUTO: 11.7 % — LOW (ref 20.5–51.1)
MAGNESIUM SERPL-MCNC: 1.7 MG/DL — LOW (ref 1.8–2.4)
MCHC RBC-ENTMCNC: 23.6 PG — LOW (ref 27–31)
MCHC RBC-ENTMCNC: 28.9 G/DL — LOW (ref 32–37)
MCV RBC AUTO: 81.8 FL — SIGNIFICANT CHANGE UP (ref 81–99)
MONOCYTES # BLD AUTO: 0.47 K/UL — SIGNIFICANT CHANGE UP (ref 0.1–0.6)
MONOCYTES NFR BLD AUTO: 4.5 % — SIGNIFICANT CHANGE UP (ref 1.7–9.3)
NEUTROPHILS # BLD AUTO: 8.25 K/UL — HIGH (ref 1.4–6.5)
NEUTROPHILS NFR BLD AUTO: 79.7 % — HIGH (ref 42.2–75.2)
NRBC # BLD: 0 /100 WBCS — SIGNIFICANT CHANGE UP (ref 0–0)
PLATELET # BLD AUTO: 265 K/UL — SIGNIFICANT CHANGE UP (ref 130–400)
POTASSIUM SERPL-MCNC: 4.2 MMOL/L — SIGNIFICANT CHANGE UP (ref 3.5–5)
POTASSIUM SERPL-SCNC: 4.2 MMOL/L — SIGNIFICANT CHANGE UP (ref 3.5–5)
PROT SERPL-MCNC: 4.9 G/DL — LOW (ref 6–8)
RBC # BLD: 3.47 M/UL — LOW (ref 4.2–5.4)
RBC # FLD: 18.5 % — HIGH (ref 11.5–14.5)
SODIUM SERPL-SCNC: 139 MMOL/L — SIGNIFICANT CHANGE UP (ref 135–146)
WBC # BLD: 10.35 K/UL — SIGNIFICANT CHANGE UP (ref 4.8–10.8)
WBC # FLD AUTO: 10.35 K/UL — SIGNIFICANT CHANGE UP (ref 4.8–10.8)

## 2023-02-09 PROCEDURE — 99232 SBSQ HOSP IP/OBS MODERATE 35: CPT

## 2023-02-09 PROCEDURE — 99239 HOSP IP/OBS DSCHRG MGMT >30: CPT

## 2023-02-09 RX ORDER — LOSARTAN POTASSIUM 100 MG/1
1 TABLET, FILM COATED ORAL
Qty: 21 | Refills: 0
Start: 2023-02-09 | End: 2023-03-01

## 2023-02-09 RX ORDER — MULTIVIT WITH MIN/MFOLATE/K2 340-15/3 G
1 POWDER (GRAM) ORAL ONCE
Refills: 0 | Status: COMPLETED | OUTPATIENT
Start: 2023-02-09 | End: 2023-02-09

## 2023-02-09 RX ORDER — BUDESONIDE AND FORMOTEROL FUMARATE DIHYDRATE 160; 4.5 UG/1; UG/1
2 AEROSOL RESPIRATORY (INHALATION)
Qty: 1 | Refills: 0
Start: 2023-02-09 | End: 2023-03-01

## 2023-02-09 RX ORDER — TIOTROPIUM BROMIDE 18 UG/1
2 CAPSULE ORAL; RESPIRATORY (INHALATION)
Qty: 1 | Refills: 0
Start: 2023-02-09 | End: 2023-03-01

## 2023-02-09 RX ADMIN — ALBUTEROL 2.5 MILLIGRAM(S): 90 AEROSOL, METERED ORAL at 09:28

## 2023-02-09 RX ADMIN — Medication 1 MILLIGRAM(S): at 06:03

## 2023-02-09 RX ADMIN — CLOPIDOGREL BISULFATE 75 MILLIGRAM(S): 75 TABLET, FILM COATED ORAL at 12:19

## 2023-02-09 RX ADMIN — PANTOPRAZOLE SODIUM 40 MILLIGRAM(S): 20 TABLET, DELAYED RELEASE ORAL at 06:01

## 2023-02-09 RX ADMIN — SERTRALINE 100 MILLIGRAM(S): 25 TABLET, FILM COATED ORAL at 12:18

## 2023-02-09 RX ADMIN — Medication 5 MILLIGRAM(S): at 06:00

## 2023-02-09 RX ADMIN — Medication 1: at 11:54

## 2023-02-09 RX ADMIN — Medication 650 MILLIGRAM(S): at 13:10

## 2023-02-09 RX ADMIN — Medication 81 MILLIGRAM(S): at 12:19

## 2023-02-09 RX ADMIN — Medication 10 MILLIGRAM(S): at 12:18

## 2023-02-09 RX ADMIN — ALBUTEROL 2.5 MILLIGRAM(S): 90 AEROSOL, METERED ORAL at 13:54

## 2023-02-09 RX ADMIN — Medication 650 MILLIGRAM(S): at 12:20

## 2023-02-09 RX ADMIN — LOSARTAN POTASSIUM 25 MILLIGRAM(S): 100 TABLET, FILM COATED ORAL at 06:01

## 2023-02-09 RX ADMIN — Medication 20 MILLIGRAM(S): at 06:01

## 2023-02-09 RX ADMIN — ENOXAPARIN SODIUM 40 MILLIGRAM(S): 100 INJECTION SUBCUTANEOUS at 12:18

## 2023-02-09 RX ADMIN — CARVEDILOL PHOSPHATE 12.5 MILLIGRAM(S): 80 CAPSULE, EXTENDED RELEASE ORAL at 06:01

## 2023-02-09 RX ADMIN — GABAPENTIN 600 MILLIGRAM(S): 400 CAPSULE ORAL at 06:00

## 2023-02-09 RX ADMIN — BUDESONIDE AND FORMOTEROL FUMARATE DIHYDRATE 2 PUFF(S): 160; 4.5 AEROSOL RESPIRATORY (INHALATION) at 08:23

## 2023-02-09 NOTE — DISCHARGE NOTE PROVIDER - NSDCMRMEDTOKEN_GEN_ALL_CORE_FT
albuterol 90 mcg/inh inhalation aerosol: 2 puff(s) inhaled every 6 hours, As Needed  aluminum hydroxide-magnesium hydroxide 200 mg-200 mg/5 mL oral suspension: 30 milliliter(s) orally every 4 hours, As needed, Dyspepsia  aspirin 81 mg oral delayed release tablet: 1 tab(s) orally once a day  atorvastatin 40 mg oral tablet: 1 tab(s) orally once a day (at bedtime)   bisacodyl 10 mg rectal suppository: 1 suppository(ies) rectal once a day, As needed, Constipation  budesonide-formoterol 160 mcg-4.5 mcg/inh inhalation aerosol: 2 puff(s) inhaled 2 times a day   carvedilol 25 mg oral tablet: 0.5 tab(s) orally 2 times a day  clopidogrel 75 mg oral tablet: 1 tab(s) orally once a day MDD:1  dicyclomine 20 mg oral tablet: 1 tab(s) orally 3 times a day (before meals), As needed, abdominal pain  gabapentin 600 mg oral tablet: 1 tab(s) orally 2 times a day  losartan 25 mg oral tablet: 1 tab(s) orally once a day  pantoprazole 40 mg oral delayed release tablet: 1 tab(s) orally once a day  predniSONE 20 mg oral tablet: 1 tab(s) orally every 12 hours for the next 3 days. then starting 2/13/23 take 1 tab only once a day for the next five days then stop.   risperiDONE 1 mg oral tablet: 1 tab(s) orally once a day (at bedtime)  solifenacin 5 mg oral tablet: 1 tab(s) orally once a day  tiotropium 2.5 mcg/inh inhalation aerosol: 2 puff(s) inhaled once a day  Tradjenta 5 mg oral tablet: 1 tab(s) orally once a day  Trelegy Ellipta 200 mcg-62.5 mcg-25 mcg/inh inhalation powder: 1 puff(s) inhaled once a day  Xanax 1 mg oral tablet: 1 tab(s) orally 2 times a day  Zoloft 100 mg oral tablet: 1 tab(s) orally once a day

## 2023-02-09 NOTE — PROGRESS NOTE ADULT - ASSESSMENT
83 yo female Pmhx ILD, Copd (sent on 2L NC last admission), Cad with stent x 1 2019, Dm, Htn, HFrEF (40-45%) presenting for SOB.      #COPD exacerbation  #acute on chronic respiratory failure  #Recent dx ILD (New findings of UIP on CT 01/23/2023)  CXR stable diffuse interstitial opacities  DC with home o2 2 LNC since  last admission  Pulmonary on board  - hold abx, had abx course last admission and her procal is not elevated, CXR stable   - on solumedrol 40 BID, switch to prednisone 20mg BID per pulm   - Cont duonebs q6h and PRN    #Dysuria, resolved , ucx neg    #chronic HFmrEF, seen by cardio last admission, work up as outpt, c/w GDM  #CAD- s/p stent 2019  #HTN  - TTE (01/2023) ->EF 40-45%, G1DD,  Entire apex and basal and mid inferolateral wall are abnormal.  - during last admission cardio recommended OP nuclear stress test due to new changes on the TTE  - c/w carvedilol, losartan  - c/w DAPT   - follows with Dr Leyva    #anemia  - Hb 8.4 at baseline    #DM  - on trajenta at home  - monitor FS    #HLD  - c/w atorvastatin    DVT PPX, Lovenox    DNR/DNI    #Progress Note Handoff  Pending (specify): Cont IV steroids + duonebs  Family discussion: d/w pt and son regarding tx    Disposition: Home  in 24 hours   
81 yo female Pmhx ILD, Copd (sent on 2L NC last admission), Cad with stent x 1 2019, Dm, Htn, HFrEF (40-45%) presenting for SOB, dysuria.    #COPD exacerbation  #Acute on chronic hypoxemic respiratory failure  #Recent dx ILD (New findings of UIP on CT 01/23/2023)  - previously was discharged on long term steroids, returning to hospital symptomatic  - c/w ceftriaxone/azithromycin  -  solumedrol 40 BID  - RSV-COVID-FLU negative  - albuterol   - c/w  Symbicort and spiriva  - solumedrol 20 bid   - Taper to PO prednisone when clinically improved; 10 day course  - pulm following     #HFrEF  #CAD- s/p stent 2019  #HTN  - TTE (01/2023) ->EF 40-45%, G1DD,  Entire apex and basal and mid inferolateral wall are abnormal.  - during last admission cardio recommended OP nuclear stress test due to new changes on the TTE  - proBNP 2288, trop<0.01  - c/w carvedilol, losartan  - c/w DAPT   - follows with Dr Leyva  - lasix iv 20 mg once today    #suspected cystitis  - reportedly patient's urine was positive  - complains of suprapubic tenderness and dysuria  - here UA negative  but f/u microscopic analysis  - fu urine culture    #anemia  - Hb 8.4 at baseline  - takes IV iron occasionally -> follows with Dr hernandez    #DM  - on trajenta at home  - monitor FS    #HLD  - c/w atorvastatin    #MISC  DVT PPX Lovenox  GI PPX Protonix  DIET DASH/TLC/CC  CODE DNR/DNI
81 yo female Pmhx ILD, Copd (sent on 2L NC last admission), Cad with stent x 1 2019, Dm, Htn, HFrEF (40-45%) presenting for SOB, dysuria.    #COPD exacerbation  #acute on chronic respiratory failure  #Recent dx ILD (New findings of UIP on CT 01/23/2023)  CXR stable diffuse interstitial opacities  DC with home o2 on last admission  Pulmonary on board  - c/w ceftriaxone/azithromycin  - c/w solumedrol 40 qD  - Cont duonebs q6h and PRN  - Cont ceftriaxone/azithromycin  - f/u procalcitonin  - O/P pulmonary f/u for further ILD workup    #suspected cystitis  - reportedly patient's urine was positive  - complains of suprapubic tenderness and dysuria  - here UA negative  but f/u microscopic analysis  - fu urine culture  - Cont IV abx for now    #Chronic HFrEF  #CAD- s/p stent 2019  #HTN  - TTE (01/2023) ->EF 40-45%, G1DD,  Entire apex and basal and mid inferolateral wall are abnormal.  - during last admission cardio recommended OP nuclear stress test due to new changes on the TTE  - c/w carvedilol, losartan  - c/w DAPT   - follows with Dr Leyva    #anemia  - Hb 8.4 at baseline  - tales IV iron occasionally -> follows with Dr hernandez    #DM  - on trajenta at home  - monitor FS    #HLD  - c/w atorvastatin    DVT PPX, Lovenox    #Progress Note Handoff  Pending (specify): Cont IV steroids + duonebs  Family discussion: d/w pt and daughter regarding tx for COPD. Discussed abt ILD diagnosis from last admission  Disposition: Home  
83 yo female Pmhx ILD, Copd (sent on 2L NC last admission), Cad with stent x 1 2019, Dm, Htn, HFrEF (40-45%) presenting for SOB, dysuria.    #COPD exacerbation  #Acute on chronic hypoxemic respiratory failure  #Recent dx ILD (New findings of UIP on CT 01/23/2023)  - previously was discharged on long term steroids, returning to hospital symptomatic  - c/w ceftriaxone/azithromycin  -  solumedrol 40 BID  - RSV-COVID-FLU negative  - Cont duonebs q6h and PRN  - Continue with triple inhaler therapy; Symbicort and spiriva  -f/u procal if negative dc abx     #HFrEF  #CAD- s/p stent 2019  #HTN  - TTE (01/2023) ->EF 40-45%, G1DD,  Entire apex and basal and mid inferolateral wall are abnormal.  - during last admission cardio recommended OP nuclear stress test due to new changes on the TTE  - proBNP 2288, trop<0.01  - c/w carvedilol, losartan  - c/w DAPT   - follows with Dr Leyva  - lasix iv 20 mg once today    #suspected cystitis  - reportedly patient's urine was positive  - complains of suprapubic tenderness and dysuria  - here UA negative  but f/u microscopic analysis  - fu urine culture    #anemia  - Hb 8.4 at baseline  - takes IV iron occasionally -> follows with Dr hernandez    #DM  - on trajenta at home  - monitor FS    #HLD  - c/w atorvastatin    #MISC  DVT PPX Lovenox  GI PPX Protonix  DIET DASH/TLC/CC  CODE DNR/DNI
IMPRESSION    Acute on chronic hypoxemic respiratory failure  ILD  Anemia  COPD exacerbation  HO COPD  HO CAD s/p PCI      RECOMMENDATIONS:      CXR noted with diffuse infiltrates; not much changed compared to last time   BNP more elevated this admission; Echo with G1DD and EF 40%  RVP panel is negative; procal is negative; no fevers; no leukocytosis  Continue steroids; may lower to Solumedrol 20 mg BID   Taper to PO prednisone when clinically improved; 10 day course  Continue with triple inhaler therapy; Symbicort and spiriva  Switch nebs to Albuterol only to avoid ADILENE and LAMA combination   Oral diet; speech and swallow  Keep spo2 more than 88%; on 2LPM   On Lovenox daily for DVT ppx; recent duplex negative  Doubt CTD; outpatient baseline workup can be done   Continue with GI prophylaxis which is indicated in patient with pulm fibrosis   Will follow       
KIMBER BARBER is a 82y woman with a medical history significant for ILD + COPD on 2L, CAD s/p stent and ICM who presented initially with increasing shortness of breath and sputum production, and is now admitted for acute exacerbation of COPD.  Pulmonary is consulted for further management of complex lung disease.    IMPRESSION    Acute on chronic hypoxemic respiratory failure  ILD  Anemia  COPD exacerbation  HO COPD  HO CAD s/p PCI      RECOMMENDATIONS:      CXR noted with diffuse infiltrates; not much changed compared to last time   BNP more elevated this admission; Echo with G1DD and EF 40%; recommend one dose of IV lasix 20mg   Check procalcitonin level; RVP is negative  If procalcitonin is low then can DC abx from pulm standpoint  Recommend Solumedrol 40 mg IV BID; Albuterol nebs as needed   Continue with triple inhaler therapy; Symbicort and spiriva  Keep spo2 more than 88%; on 2LPM   On Lovenox daily for DVT ppx; recent duplex negative  Doubt CTD; outpatient baseline workup can be done   Will follow       
IMPRESSION    Acute on chronic hypoxemic respiratory failure  ILD  Anemia  COPD exacerbation  HO COPD  HO CAD s/p PCI      RECOMMENDATIONS:      CXR noted with diffuse infiltrates; unchanged   BNP more elevated this admission; Echo with G1DD and EF 40%  RVP panel is negative; procal is negative; no fevers; no leukocytosis  On PO Prednisone 20mg BID; taper over 10 days   Continue with triple inhaler therapy; Symbicort and spiriva  Continue Albuterol nebs as needed  Oral diet; per speech and swallow  Keep spo2 more than 88%; on 2LPM   On Lovenox daily for DVT ppx; recent duplex negative  Doubt CTD; outpatient baseline workup can be done   Continue with GI prophylaxis with PPI   Recall as needed      
83 yo female Pmhx ILD, Copd (sent on 2L NC last admission), Cad with stent x 1 2019, Dm, Htn, HFrEF (40-45%) presenting for SOB, dysuria.    #COPD exacerbation  #Acute on chronic hypoxemic respiratory failure  #Recent dx ILD (New findings of UIP on CT 01/23/2023)  - previously was discharged on long term steroids, returning to hospital symptomatic  - c/w ceftriaxone/azithromycin  -  solumedrol 40 BID  - RSV-COVID-FLU negative  - albuterol   - c/w  Symbicort and spiriva  - Switch solumedrol  to po prednisone 20mg BID and taper over 10 days   - pulm following     #HFrEF  #CAD- s/p stent 2019  #HTN  - TTE (01/2023) ->EF 40-45%, G1DD,  Entire apex and basal and mid inferolateral wall are abnormal.  - during last admission cardio recommended OP nuclear stress test due to new changes on the TTE  - proBNP 2288, trop<0.01  - c/w carvedilol, losartan  - c/w DAPT   - follows with Dr Leyva  - lasix iv 20 mg once today    #suspected cystitis  - reportedly patient's urine was positive  - complains of suprapubic tenderness and dysuria  - here UA negative  but f/u microscopic analysis  - fu urine culture    #anemia  - Hb 8.4 at baseline  - takes IV iron occasionally -> follows with Dr hernandez    #DM  - on trajenta at home  - monitor FS    #HLD  - c/w atorvastatin    #MISC  DVT PPX Lovenox  GI PPX Protonix  DIET DASH/TLC/CC  CODE DNR/DNI
IMPRESSION    Acute on chronic hypoxemic respiratory failure  ILD  Anemia  COPD exacerbation  HO COPD  HO CAD s/p PCI      RECOMMENDATIONS:      CXR noted with diffuse infiltrates; unchanged   BNP more elevated this admission; Echo with G1DD and EF 40%  RVP panel is negative; procal is negative; no fevers; no leukocytosis  Swithc to po prednisone 20mg BID and taper over 10 days   Continue with triple inhaler therapy; Symbicort and spiriva  Continue Albuterol nebs as needed  Oral diet; per speech and swallow  Keep spo2 more than 88%; on 2LPM   On Lovenox daily for DVT ppx; recent duplex negative  Doubt CTD; outpatient baseline workup can be done   Continue with GI prophylaxis with PPI   Will follow as needed

## 2023-02-09 NOTE — PROGRESS NOTE ADULT - PROVIDER SPECIALTY LIST ADULT
Internal Medicine
Pulmonology
Hospitalist
Internal Medicine
Pulmonology

## 2023-02-09 NOTE — DISCHARGE NOTE PROVIDER - NSDCFUSCHEDAPPT_GEN_ALL_CORE_FT
Wesley Rogers  [facility]  PULMMED [address] [place] [address] Av  Scheduled Appointment: 02/21/2023

## 2023-02-09 NOTE — DISCHARGE NOTE PROVIDER - HOSPITAL COURSE
83 yo female Pmhx ILD, Copd (sent on 2L NC last admission), Cad with stent x 1 2019, Dm, Htn, HFrEF (40-45%) presenting for SOB, dysuria.    #COPD exacerbation  #Acute on chronic hypoxemic respiratory failure  #Recent dx ILD (New findings of UIP on CT 01/23/2023)  - previously was discharged on long term steroids, returning to hospital symptomatic  - was given ceftriaxone/azithromycin  - RSV-COVID-FLU negative  - albuterol   - c/w  Symbicort and spiriva  - Switch solumedrol  to po prednisone 20mg BID and taper over 10 days   - pulm following     #HFrEF  #CAD- s/p stent 2019  #HTN  - TTE (01/2023) ->EF 40-45%, G1DD,  Entire apex and basal and mid inferolateral wall are abnormal.  - during last admission cardio recommended OP nuclear stress test due to new changes on the TTE  -f/u cardio outpatient   - proBNP 2288, trop<0.01  - c/w carvedilol, losartan  - c/w DAPT   - follows with Dr eLyva    #suspected cystitis  ua was negative, urine culture neg    #anemia  - Hb 8.4 at baseline  - takes IV iron occasionally -> follows with Dr hernandez

## 2023-02-09 NOTE — DISCHARGE NOTE PROVIDER - PROVIDER TOKENS
PROVIDER:[TOKEN:[[id number]],FOLLOWUP:[1 week]],PROVIDER:[TOKEN:[[id number]],FOLLOWUP:[1 week],ESTABLISHEDPATIENT:[T]]

## 2023-02-09 NOTE — DISCHARGE NOTE PROVIDER - NSDCHHBASESERVICE_GEN_ALL_CORE
Impression: Primary open-angle glaucoma, bilateral, moderate stage: A61.2733. Plan: transferring care here
moderate cupping, disc heme OS 09/17/21 IOP today: 14/14 on rocklatan qhs OU, cosopt BID OU pachs slightly thin OCT RNFL 11/01/21 OD: no thinning, OS: inf thinning HVF10-2 today: reliable OU, OD: paracentral defect and small sup arcuate, OS: superior arcuate with paracentral defect
cont current gtts, IOP check in 4 months Physical therapy

## 2023-02-09 NOTE — DISCHARGE NOTE PROVIDER - DISCHARGE DIET
Low Sodium Diet/Consistent Carbohydrate Diabetic Diets
Normal vision: sees adequately in most situations; can see medication labels, newsprint

## 2023-02-09 NOTE — PROGRESS NOTE ADULT - SUBJECTIVE AND OBJECTIVE BOX
Patient is a 82y old  Female who presents with a chief complaint of       Over Night Events:    On 2LPM   Still has some wheezing   Feels better         ROS:  See HPI    PHYSICAL EXAM    ICU Vital Signs Last 24 Hrs  T(C): 36.6 (09 Feb 2023 05:01), Max: 36.6 (08 Feb 2023 20:13)  T(F): 97.9 (09 Feb 2023 05:01), Max: 97.9 (08 Feb 2023 20:13)  HR: 65 (09 Feb 2023 05:01) (65 - 77)  BP: 158/72 (09 Feb 2023 05:01) (126/60 - 171/78)  BP(mean): --  ABP: --  ABP(mean): --  RR: 18 (09 Feb 2023 05:01) (18 - 18)  SpO2: 99% (09 Feb 2023 05:01) (99% - 99%)        General: NAD  HEENT: CAL             Lymphatic system: No cervical LN   Lungs: Bilateral wheezing on exp   Cardiovascular: Regular   Gastrointestinal: Soft, Positive BS  Extremities: No clubbing.  Moves extremities.  Full Range of motion   Skin: Warm, intact  Neurological: No motor or sensory deficit         LABS:                            8.2    10.35 )-----------( 265      ( 09 Feb 2023 01:31 )             28.4                                               02-09    139  |  101  |  23<H>  ----------------------------<  228<H>  4.2   |  30  |  0.7    Ca    8.6      09 Feb 2023 01:31  Mg     1.7     02-09    TPro  4.9<L>  /  Alb  3.0<L>  /  TBili  <0.2  /  DBili  x   /  AST  13  /  ALT  26  /  AlkPhos  51  02-09                                                                                           LIVER FUNCTIONS - ( 09 Feb 2023 01:31 )  Alb: 3.0 g/dL / Pro: 4.9 g/dL / ALK PHOS: 51 U/L / ALT: 26 U/L / AST: 13 U/L / GGT: x                                                                                                                                       MEDICATIONS  (STANDING):  albuterol    0.083% 2.5 milliGRAM(s) Nebulizer every 6 hours  ALPRAZolam 1 milliGRAM(s) Oral two times a day  aspirin enteric coated 81 milliGRAM(s) Oral daily  atorvastatin 40 milliGRAM(s) Oral at bedtime  budesonide 160 MICROgram(s)/formoterol 4.5 MICROgram(s) Inhaler 2 Puff(s) Inhalation two times a day  carvedilol 12.5 milliGRAM(s) Oral every 12 hours  clopidogrel Tablet 75 milliGRAM(s) Oral daily  dextrose 5%. 1000 milliLiter(s) (100 mL/Hr) IV Continuous <Continuous>  dextrose 5%. 1000 milliLiter(s) (50 mL/Hr) IV Continuous <Continuous>  dextrose 50% Injectable 25 Gram(s) IV Push once  dextrose 50% Injectable 12.5 Gram(s) IV Push once  dextrose 50% Injectable 25 Gram(s) IV Push once  enoxaparin Injectable 40 milliGRAM(s) SubCutaneous every 24 hours  gabapentin 600 milliGRAM(s) Oral two times a day  glucagon  Injectable 1 milliGRAM(s) IntraMuscular once  insulin lispro (ADMELOG) corrective regimen sliding scale   SubCutaneous Before meals and at bedtime  losartan 25 milliGRAM(s) Oral daily  oxybutynin 5 milliGRAM(s) Oral two times a day  pantoprazole    Tablet 40 milliGRAM(s) Oral before breakfast  predniSONE   Tablet 20 milliGRAM(s) Oral every 12 hours  risperiDONE   Tablet 1 milliGRAM(s) Oral at bedtime  sertraline 100 milliGRAM(s) Oral daily  tiotropium 2.5 MICROgram(s) Inhaler 2 Puff(s) Inhalation daily    MEDICATIONS  (PRN):  acetaminophen     Tablet .. 650 milliGRAM(s) Oral every 6 hours PRN Temp greater or equal to 38C (100.4F), Mild Pain (1 - 3)  aluminum hydroxide/magnesium hydroxide/simethicone Suspension 30 milliLiter(s) Oral every 4 hours PRN Dyspepsia  bisacodyl Suppository 10 milliGRAM(s) Rectal daily PRN Constipation  dextrose Oral Gel 15 Gram(s) Oral once PRN Blood Glucose LESS THAN 70 milliGRAM(s)/deciliter  dicyclomine 20 milliGRAM(s) Oral three times a day before meals PRN abdominal pain  melatonin 3 milliGRAM(s) Oral at bedtime PRN Insomnia  ondansetron Injectable 4 milliGRAM(s) IV Push every 8 hours PRN Nausea and/or Vomiting

## 2023-02-09 NOTE — DISCHARGE NOTE PROVIDER - NSFOLLOWUPCLINICS_GEN_ALL_ED_FT
Pt's desat noted-86-88% on RA. Pt states \"I usually wear O2 at night because of my heart condition. \" 2L NC O2 applied to pt. O2 sats improved to 90-94%. Denies any respiratory distress. Call light in reach.  Will continue to [facility] AT [facility] DARRELL, RN  02/53/86 [id number] A Cardiologist  Cardiology  .  [place]   Phone:   Fax:

## 2023-02-09 NOTE — DISCHARGE NOTE PROVIDER - NSDCCPCAREPLAN_GEN_ALL_CORE_FT
PRINCIPAL DISCHARGE DIAGNOSIS  Diagnosis: Shortness of breath  Assessment and Plan of Treatment: you presented to hospital with shortness of breath. this was due to interstitual lung desease as well as an exacerbation of your copd. you were treated with steroids and inhaler. please finish your steroid course as prescribed. please ensure you follow up with pulmonology and your primary care doc as an outpatient.      SECONDARY DISCHARGE DIAGNOSES  Diagnosis: COPD exacerbation  Assessment and Plan of Treatment:     Diagnosis: Pneumonia  Assessment and Plan of Treatment:     Diagnosis: Urinary tract infection  Assessment and Plan of Treatment:

## 2023-02-10 LAB
CULTURE RESULTS: SIGNIFICANT CHANGE UP
CULTURE RESULTS: SIGNIFICANT CHANGE UP
SPECIMEN SOURCE: SIGNIFICANT CHANGE UP
SPECIMEN SOURCE: SIGNIFICANT CHANGE UP

## 2023-02-15 DIAGNOSIS — E88.09 OTHER DISORDERS OF PLASMA-PROTEIN METABOLISM, NOT ELSEWHERE CLASSIFIED: ICD-10-CM

## 2023-02-15 DIAGNOSIS — E11.42 TYPE 2 DIABETES MELLITUS WITH DIABETIC POLYNEUROPATHY: ICD-10-CM

## 2023-02-15 DIAGNOSIS — J96.21 ACUTE AND CHRONIC RESPIRATORY FAILURE WITH HYPOXIA: ICD-10-CM

## 2023-02-15 DIAGNOSIS — J84.9 INTERSTITIAL PULMONARY DISEASE, UNSPECIFIED: ICD-10-CM

## 2023-02-15 DIAGNOSIS — Z79.82 LONG TERM (CURRENT) USE OF ASPIRIN: ICD-10-CM

## 2023-02-15 DIAGNOSIS — Z79.52 LONG TERM (CURRENT) USE OF SYSTEMIC STEROIDS: ICD-10-CM

## 2023-02-15 DIAGNOSIS — I25.10 ATHEROSCLEROTIC HEART DISEASE OF NATIVE CORONARY ARTERY WITHOUT ANGINA PECTORIS: ICD-10-CM

## 2023-02-15 DIAGNOSIS — E78.5 HYPERLIPIDEMIA, UNSPECIFIED: ICD-10-CM

## 2023-02-15 DIAGNOSIS — Z95.5 PRESENCE OF CORONARY ANGIOPLASTY IMPLANT AND GRAFT: ICD-10-CM

## 2023-02-15 DIAGNOSIS — I50.23 ACUTE ON CHRONIC SYSTOLIC (CONGESTIVE) HEART FAILURE: ICD-10-CM

## 2023-02-15 DIAGNOSIS — J44.1 CHRONIC OBSTRUCTIVE PULMONARY DISEASE WITH (ACUTE) EXACERBATION: ICD-10-CM

## 2023-02-15 DIAGNOSIS — D64.9 ANEMIA, UNSPECIFIED: ICD-10-CM

## 2023-02-15 DIAGNOSIS — I11.0 HYPERTENSIVE HEART DISEASE WITH HEART FAILURE: ICD-10-CM

## 2023-02-15 DIAGNOSIS — Z66 DO NOT RESUSCITATE: ICD-10-CM

## 2023-02-21 ENCOUNTER — APPOINTMENT (OUTPATIENT)
Dept: PULMONOLOGY | Facility: CLINIC | Age: 83
End: 2023-02-21
Payer: MEDICARE

## 2023-02-21 VITALS
RESPIRATION RATE: 18 BRPM | HEART RATE: 87 BPM | SYSTOLIC BLOOD PRESSURE: 126 MMHG | WEIGHT: 150 LBS | OXYGEN SATURATION: 98 % | DIASTOLIC BLOOD PRESSURE: 80 MMHG | BODY MASS INDEX: 30.24 KG/M2 | HEIGHT: 59 IN

## 2023-02-21 DIAGNOSIS — J84.89 OTHER SPECIFIED INTERSTITIAL PULMONARY DISEASES: ICD-10-CM

## 2023-02-21 DIAGNOSIS — J44.9 CHRONIC OBSTRUCTIVE PULMONARY DISEASE, UNSPECIFIED: ICD-10-CM

## 2023-02-21 DIAGNOSIS — J84.9 INTERSTITIAL PULMONARY DISEASE, UNSPECIFIED: ICD-10-CM

## 2023-02-21 PROBLEM — Z00.00 ENCOUNTER FOR PREVENTIVE HEALTH EXAMINATION: Status: ACTIVE | Noted: 2023-02-21

## 2023-02-21 PROCEDURE — 99214 OFFICE O/P EST MOD 30 MIN: CPT

## 2023-02-21 NOTE — REASON FOR VISIT
[Follow-Up - From Hospitalization] : a follow-up visit after a recent hospitalization [COPD] : COPD [Shortness of Breath] : shortness of breath [ILD] : ILD

## 2023-02-21 NOTE — HISTORY OF PRESENT ILLNESS
[TextBox_4] : 82 years old presented for above was admitted to the hospital twice for pneumonia–COPD–interstitial lung disease discharged home on oxygen remote history of smoking and was heavy smoker I saw her in 2016.  Now she is oxygen dependent shortness of breath on exertion and she is also need wheelchair now.  She reports occasional intermittent cough.

## 2023-02-21 NOTE — PHYSICAL EXAM
[No Acute Distress] : no acute distress [Normal Oropharynx] : normal oropharynx [Normal Appearance] : normal appearance [No Neck Mass] : no neck mass [Normal Rate/Rhythm] : normal rate/rhythm [Normal S1, S2] : normal s1, s2 [No Murmurs] : no murmurs [No Abnormalities] : no abnormalities [Benign] : benign [Normal Gait] : normal gait [No Clubbing] : no clubbing [No Cyanosis] : no cyanosis [No Edema] : no edema [FROM] : FROM [Normal Color/ Pigmentation] : normal color/ pigmentation [No Focal Deficits] : no focal deficits [Oriented x3] : oriented x3 [Normal Affect] : normal affect [TextBox_68] : bl crackles

## 2023-02-21 NOTE — DISCUSSION/SUMMARY
[FreeTextEntry1] : COPD–interstitial lung disease questionable organizing pneumonia discussed at length biopsy she declined she is oxygen dependent.  Will order nebulizer machine for her.  Steroid taper.\par Keep oxygen 24 hours a day.\par Discussed at length prognosis and biopsy with her son and daughter at the bedside

## 2023-03-06 RX ORDER — PREDNISONE 20 MG/1
20 TABLET ORAL
Qty: 15 | Refills: 0 | Status: ACTIVE | COMMUNITY
Start: 2023-02-21 | End: 1900-01-01

## 2023-08-24 ENCOUNTER — RX RENEWAL (OUTPATIENT)
Age: 83
End: 2023-08-24

## 2023-09-17 ENCOUNTER — RX RENEWAL (OUTPATIENT)
Age: 83
End: 2023-09-17

## 2023-09-19 ENCOUNTER — RX RENEWAL (OUTPATIENT)
Age: 83
End: 2023-09-19

## 2023-10-16 ENCOUNTER — RX RENEWAL (OUTPATIENT)
Age: 83
End: 2023-10-16

## 2023-11-27 ENCOUNTER — RX RENEWAL (OUTPATIENT)
Age: 83
End: 2023-11-27

## 2023-12-07 NOTE — ED ADULT NURSE NOTE - TEMPLATE
Dale states he will be coming by this afternoon in the next hour or two to drop of the paperwork that needs to be completed and faxed. Please assist when he arrives. Thank you.    General

## 2023-12-26 ENCOUNTER — RX RENEWAL (OUTPATIENT)
Age: 83
End: 2023-12-26

## 2024-01-30 ENCOUNTER — RX RENEWAL (OUTPATIENT)
Age: 84
End: 2024-01-30

## 2024-02-06 NOTE — DISCHARGE NOTE PROVIDER - DISCHARGING ATTENDING PHYSICIAN:
Faxed request for records to PCP office [phone]  
New Patient Triage Call     Intake Details:  Caller is Patient    Referring Practice Details  Referring Diagnosis: excessive nose bleeds    Patient Referred by PCP: Name Dr. Rdz    Referring location: [facility]  Referring office phone number: [phone]     Insurance Details  Was the correct insurance read to the called and verified? Yes. Insurance listed is correct    Confirmed with caller that additional records may be needed for new patient appointment- please answer phone as it could be our office requesting additional information for a meaningful visit: Yes     Confirmed that phone number on file is best way to contact patient: Yes     Was  utilized? Yes  Name Telma and ID [id number]. Language Danish and NA  Is referring MD patient's PCP? yes  
Eleuterio Marin

## 2024-02-07 ENCOUNTER — RX RENEWAL (OUTPATIENT)
Age: 84
End: 2024-02-07

## 2024-03-06 ENCOUNTER — RX RENEWAL (OUTPATIENT)
Age: 84
End: 2024-03-06

## 2024-03-06 RX ORDER — UMECLIDINIUM 62.5 UG/1
62.5 AEROSOL, POWDER ORAL DAILY
Qty: 30 | Refills: 0 | Status: ACTIVE | COMMUNITY
Start: 2023-08-24 | End: 1900-01-01

## 2024-03-11 ENCOUNTER — EMERGENCY (EMERGENCY)
Facility: HOSPITAL | Age: 84
LOS: 0 days | Discharge: HOME | End: 2024-03-11
Attending: EMERGENCY MEDICINE
Payer: MEDICARE

## 2024-03-11 VITALS
RESPIRATION RATE: 17 BRPM | OXYGEN SATURATION: 100 % | WEIGHT: 164.91 LBS | HEIGHT: 62 IN | HEART RATE: 74 BPM | SYSTOLIC BLOOD PRESSURE: 206 MMHG | DIASTOLIC BLOOD PRESSURE: 80 MMHG | TEMPERATURE: 98 F

## 2024-03-11 DIAGNOSIS — Z87.891 PERSONAL HISTORY OF NICOTINE DEPENDENCE: ICD-10-CM

## 2024-03-11 DIAGNOSIS — J44.9 CHRONIC OBSTRUCTIVE PULMONARY DISEASE, UNSPECIFIED: ICD-10-CM

## 2024-03-11 DIAGNOSIS — I50.9 HEART FAILURE, UNSPECIFIED: ICD-10-CM

## 2024-03-11 DIAGNOSIS — I11.0 HYPERTENSIVE HEART DISEASE WITH HEART FAILURE: ICD-10-CM

## 2024-03-11 DIAGNOSIS — R10.84 GENERALIZED ABDOMINAL PAIN: ICD-10-CM

## 2024-03-11 DIAGNOSIS — E11.9 TYPE 2 DIABETES MELLITUS WITHOUT COMPLICATIONS: ICD-10-CM

## 2024-03-11 DIAGNOSIS — R11.0 NAUSEA: ICD-10-CM

## 2024-03-11 DIAGNOSIS — N32.9 BLADDER DISORDER, UNSPECIFIED: ICD-10-CM

## 2024-03-11 DIAGNOSIS — I25.10 ATHEROSCLEROTIC HEART DISEASE OF NATIVE CORONARY ARTERY WITHOUT ANGINA PECTORIS: ICD-10-CM

## 2024-03-11 DIAGNOSIS — Z90.49 ACQUIRED ABSENCE OF OTHER SPECIFIED PARTS OF DIGESTIVE TRACT: ICD-10-CM

## 2024-03-11 PROBLEM — I50.22 CHRONIC SYSTOLIC (CONGESTIVE) HEART FAILURE: Chronic | Status: ACTIVE | Noted: 2023-02-04

## 2024-03-11 LAB
ALBUMIN SERPL ELPH-MCNC: 3.9 G/DL — SIGNIFICANT CHANGE UP (ref 3.5–5.2)
ALP SERPL-CCNC: 73 U/L — SIGNIFICANT CHANGE UP (ref 30–115)
ALT FLD-CCNC: 15 U/L — SIGNIFICANT CHANGE UP (ref 0–41)
ANION GAP SERPL CALC-SCNC: 9 MMOL/L — SIGNIFICANT CHANGE UP (ref 7–14)
APPEARANCE UR: CLEAR — SIGNIFICANT CHANGE UP
AST SERPL-CCNC: 27 U/L — SIGNIFICANT CHANGE UP (ref 0–41)
BASOPHILS # BLD AUTO: 0.02 K/UL — SIGNIFICANT CHANGE UP (ref 0–0.2)
BASOPHILS NFR BLD AUTO: 0.2 % — SIGNIFICANT CHANGE UP (ref 0–1)
BILIRUB SERPL-MCNC: <0.2 MG/DL — SIGNIFICANT CHANGE UP (ref 0.2–1.2)
BILIRUB UR-MCNC: NEGATIVE — SIGNIFICANT CHANGE UP
BUN SERPL-MCNC: 21 MG/DL — HIGH (ref 10–20)
CALCIUM SERPL-MCNC: 8.8 MG/DL — SIGNIFICANT CHANGE UP (ref 8.4–10.5)
CHLORIDE SERPL-SCNC: 110 MMOL/L — SIGNIFICANT CHANGE UP (ref 98–110)
CO2 SERPL-SCNC: 24 MMOL/L — SIGNIFICANT CHANGE UP (ref 17–32)
COLOR SPEC: YELLOW — SIGNIFICANT CHANGE UP
CREAT SERPL-MCNC: 0.9 MG/DL — SIGNIFICANT CHANGE UP (ref 0.7–1.5)
DIFF PNL FLD: NEGATIVE — SIGNIFICANT CHANGE UP
EGFR: 64 ML/MIN/1.73M2 — SIGNIFICANT CHANGE UP
EOSINOPHIL # BLD AUTO: 0.16 K/UL — SIGNIFICANT CHANGE UP (ref 0–0.7)
EOSINOPHIL NFR BLD AUTO: 1.7 % — SIGNIFICANT CHANGE UP (ref 0–8)
GLUCOSE SERPL-MCNC: 103 MG/DL — HIGH (ref 70–99)
GLUCOSE UR QL: NEGATIVE MG/DL — SIGNIFICANT CHANGE UP
HCT VFR BLD CALC: 36.9 % — LOW (ref 37–47)
HGB BLD-MCNC: 11.7 G/DL — LOW (ref 12–16)
IMM GRANULOCYTES NFR BLD AUTO: 0.4 % — HIGH (ref 0.1–0.3)
KETONES UR-MCNC: NEGATIVE MG/DL — SIGNIFICANT CHANGE UP
LACTATE SERPL-SCNC: 0.6 MMOL/L — LOW (ref 0.7–2)
LEUKOCYTE ESTERASE UR-ACNC: ABNORMAL
LIDOCAIN IGE QN: 31 U/L — SIGNIFICANT CHANGE UP (ref 7–60)
LYMPHOCYTES # BLD AUTO: 0.84 K/UL — LOW (ref 1.2–3.4)
LYMPHOCYTES # BLD AUTO: 9 % — LOW (ref 20.5–51.1)
MCHC RBC-ENTMCNC: 28.8 PG — SIGNIFICANT CHANGE UP (ref 27–31)
MCHC RBC-ENTMCNC: 31.7 G/DL — LOW (ref 32–37)
MCV RBC AUTO: 90.9 FL — SIGNIFICANT CHANGE UP (ref 81–99)
MONOCYTES # BLD AUTO: 0.51 K/UL — SIGNIFICANT CHANGE UP (ref 0.1–0.6)
MONOCYTES NFR BLD AUTO: 5.5 % — SIGNIFICANT CHANGE UP (ref 1.7–9.3)
NEUTROPHILS # BLD AUTO: 7.76 K/UL — HIGH (ref 1.4–6.5)
NEUTROPHILS NFR BLD AUTO: 83.2 % — HIGH (ref 42.2–75.2)
NITRITE UR-MCNC: NEGATIVE — SIGNIFICANT CHANGE UP
NRBC # BLD: 0 /100 WBCS — SIGNIFICANT CHANGE UP (ref 0–0)
PH UR: 6.5 — SIGNIFICANT CHANGE UP (ref 5–8)
PLATELET # BLD AUTO: 222 K/UL — SIGNIFICANT CHANGE UP (ref 130–400)
PMV BLD: 9.6 FL — SIGNIFICANT CHANGE UP (ref 7.4–10.4)
POTASSIUM SERPL-MCNC: 4.4 MMOL/L — SIGNIFICANT CHANGE UP (ref 3.5–5)
POTASSIUM SERPL-SCNC: 4.4 MMOL/L — SIGNIFICANT CHANGE UP (ref 3.5–5)
PROT SERPL-MCNC: 6.4 G/DL — SIGNIFICANT CHANGE UP (ref 6–8)
PROT UR-MCNC: 30 MG/DL
RBC # BLD: 4.06 M/UL — LOW (ref 4.2–5.4)
RBC # FLD: 14.9 % — HIGH (ref 11.5–14.5)
SODIUM SERPL-SCNC: 143 MMOL/L — SIGNIFICANT CHANGE UP (ref 135–146)
SP GR SPEC: >1.03 — HIGH (ref 1–1.03)
UROBILINOGEN FLD QL: 0.2 MG/DL — SIGNIFICANT CHANGE UP (ref 0.2–1)
WBC # BLD: 9.33 K/UL — SIGNIFICANT CHANGE UP (ref 4.8–10.8)
WBC # FLD AUTO: 9.33 K/UL — SIGNIFICANT CHANGE UP (ref 4.8–10.8)

## 2024-03-11 PROCEDURE — 36415 COLL VENOUS BLD VENIPUNCTURE: CPT

## 2024-03-11 PROCEDURE — 93005 ELECTROCARDIOGRAM TRACING: CPT

## 2024-03-11 PROCEDURE — 85025 COMPLETE CBC W/AUTO DIFF WBC: CPT

## 2024-03-11 PROCEDURE — 74177 CT ABD & PELVIS W/CONTRAST: CPT | Mod: 26,MC

## 2024-03-11 PROCEDURE — 81001 URINALYSIS AUTO W/SCOPE: CPT

## 2024-03-11 PROCEDURE — 80053 COMPREHEN METABOLIC PANEL: CPT

## 2024-03-11 PROCEDURE — 87086 URINE CULTURE/COLONY COUNT: CPT

## 2024-03-11 PROCEDURE — 83690 ASSAY OF LIPASE: CPT

## 2024-03-11 PROCEDURE — 99285 EMERGENCY DEPT VISIT HI MDM: CPT

## 2024-03-11 PROCEDURE — 74177 CT ABD & PELVIS W/CONTRAST: CPT | Mod: MC

## 2024-03-11 PROCEDURE — 93010 ELECTROCARDIOGRAM REPORT: CPT

## 2024-03-11 PROCEDURE — 83605 ASSAY OF LACTIC ACID: CPT

## 2024-03-11 PROCEDURE — 99285 EMERGENCY DEPT VISIT HI MDM: CPT | Mod: 25

## 2024-03-11 PROCEDURE — 96374 THER/PROPH/DIAG INJ IV PUSH: CPT | Mod: XU

## 2024-03-11 RX ORDER — MORPHINE SULFATE 50 MG/1
4 CAPSULE, EXTENDED RELEASE ORAL ONCE
Refills: 0 | Status: DISCONTINUED | OUTPATIENT
Start: 2024-03-11 | End: 2024-03-11

## 2024-03-11 RX ADMIN — MORPHINE SULFATE 4 MILLIGRAM(S): 50 CAPSULE, EXTENDED RELEASE ORAL at 17:40

## 2024-03-11 RX ADMIN — MORPHINE SULFATE 4 MILLIGRAM(S): 50 CAPSULE, EXTENDED RELEASE ORAL at 17:25

## 2024-03-11 NOTE — ED PROVIDER NOTE - PHYSICAL EXAMINATION
CONST: NAD  EYES: Sclera and conjunctiva clear.   ENT: No nasal discharge. Oropharynx normal appearing  NECK: Non-tender, no meningeal signs. normal ROM. supple   CARD: S1 S2; No jvd  RESP: Equal BS B/L, No wheezes, rhonchi or rales. No distress  GI: RLQ tenderness. Soft, non-distended. no cva tenderness. normal BS  MS: Normal ROM in all extremities. pulses 2 +. no calf tenderness or swelling  SKIN: Warm, dry, no acute rashes. Good turgor  NEURO: A&Ox3, No focal deficits. Strength 5/5 with no sensory deficits.

## 2024-03-11 NOTE — ED ADULT TRIAGE NOTE - CHIEF COMPLAINT QUOTE
Patient BIBA from home with complaints of abdominal pain and nausea that started today with frequent normal BMs. Patient reports pain is relieved at this time and denies fever, vomiting, and diarrhea.

## 2024-03-11 NOTE — ED PROVIDER NOTE - CARE PROVIDER_API CALL
Rosalba Aceves  Gastroenterology  [address] [place] [address]  Phone: [phone]  Fax: [phone]  Follow Up Time:

## 2024-03-11 NOTE — ED PROVIDER NOTE - CLINICAL SUMMARY MEDICAL DECISION MAKING FREE TEXT BOX
DIAGNOSIS; ABDOMINAL PAIN.   Patient's labs, UA, CT  WNL, no acute intra-abdominal process identified at this time, abdomen soft, non-tender. Abdominal exam without peritoneal signs. No evidence of acute abdomen currently. Well appearing. Given work up, low suspicion for acute hepatobiliary disease (including acute cholecystitis or cholangitis), acute pancreatitis (neg lipase), PUD (including gastric perforation), acute infectious processes (including pneumonia, hepatitis, pyelonephritis), acute appendicitis, vascular catastrophe, bowel obstruction, viscus perforation, torsion, diverticulitis, or acute coronary syndrome. Presentation not consistent with other acute, emergent causes of abdominal pain at this time.    No indication for admission or further emergent evaluation at this time. Was explained that the source of the patient's symptoms is unclear, and that the patient should still follow up with their primary physician or gastroenterology for further evaluation and management. These elements were discussed with the PATIENT and they are amenable to outpatient follow-up at this time.    They were given detailed return precautions and advised to return to the emergency department in 2-3 days if not improving or sooner if any new symptoms develop, symptoms worsened or for any concerns. They were offered the opportunity to ask questions and verbalized that they understand the diagnosis and discharge instructions.

## 2024-03-11 NOTE — ED PROVIDER NOTE - PROGRESS NOTE DETAILS
Results d/w patient and family and copies of results provided.  Pt instructed to return if any worsening symptoms or concerns.  They verbalize understanding. bh: Incidental findings were discussed with the patient and family and a copy of the findings were provided. Was advised that he patient follow up as an outpatient for further evaluation and management of these findings. The patient and family verbalized that they understand these instructions.

## 2024-03-11 NOTE — ED PROVIDER NOTE - ATTENDING APP SHARED VISIT CONTRIBUTION OF CARE
I have reviewed and agree with the mid-level note, except as documented in my note below.    83-year-old female history of HTN, HLD, DM, CAD status postcardiac stents, CHF, COPD (on 2 L HNC), PSH significant for cholecystectomy and hernia repair, former smoker, denies daily EtOH use, now presents with diffuse abdominal pain started approximately 1-2 hours prior to arrival, now nearly resolved, associated nausea, passing flatus, denies fever, v/d, anorexia, cough, respiratory sx, change in bowel habits or urinary sx, vaginal d/c or other associated complaints at present.     VSS, awake, alert, non-toxic appearing, no scleral icterus, oropharynx clear, mmm, no jaundice, skin rash or lesions, chest CTAB, non-labored breathing, no w/r/r, +S1/S2, RRR, no m/r/g, abdomen soft, mild ttp epigastric and LLQ w/o peritoneal signs, +BS, no hernias or distention, no pulsatile masses or bruits appreciated, no CVA tenderness, no peripheral edema or deformities, alert, clear speech.

## 2024-03-11 NOTE — ED PROVIDER NOTE - PATIENT PORTAL LINK FT
You can access the FollowMyHealth Patient Portal offered by [facility] by registering at the following website: [email or web address]. By joining [employer]’s FollowMyHealth portal, you will also be able to view your health information using other applications (apps) compatible with our system.

## 2024-03-11 NOTE — ED PROVIDER NOTE - OBJECTIVE STATEMENT
83-year-old female past medical history of interstitial lung disease, COPD on 2 L nasal cannula, CAD, diabetes, hypertension, CHF presents for evaluation of abdominal pain.  Patient developed aching generalized abdominal pain 1 hour PTA followed by 3 episodes of normal bowel movements with resolution of pain.  Presents with mild nausea.  Denies fever, headache, chest pain, shortness of breath, weakness, numbness, dysuria, hematuria, vomiting, diarrhea.

## 2024-03-11 NOTE — ED ADULT NURSE NOTE - NSFALLHARMRISKINTERV_ED_ALL_ED
Assistance OOB with selected safe patient handling equipment if applicable/Assistance with ambulation/Communicate risk of Fall with Harm to all staff, patient, and family/Monitor gait and stability/Monitor for mental status changes and reorient to person, place, and time, as needed/Provide visual cue: red socks, yellow wristband, yellow gown, etc/Reinforce activity limits and safety measures with patient and family/Use of alarms - bed, stretcher, chair and/or video monitoring/Bed in lowest position, wheels locked, appropriate side rails in place/Call bell, personal items and telephone in reach/Instruct patient to call for assistance before getting out of bed/chair/stretcher/Non-slip footwear applied when patient is off stretcher/[place] to call system/Physically safe environment - no spills, clutter or unnecessary equipment/Purposeful Proactive Rounding/Room/bathroom lighting operational, light cord in reach

## 2024-03-12 LAB
CULTURE RESULTS: SIGNIFICANT CHANGE UP
SPECIMEN SOURCE: SIGNIFICANT CHANGE UP

## 2024-04-08 RX ORDER — ALBUTEROL SULFATE 2.5 MG/3ML
(2.5 MG/3ML) SOLUTION RESPIRATORY (INHALATION)
Qty: 75 | Refills: 3 | Status: ACTIVE | COMMUNITY
Start: 2023-02-21 | End: 1900-01-01

## 2024-06-05 ENCOUNTER — APPOINTMENT (OUTPATIENT)
Dept: CARDIOLOGY | Facility: CLINIC | Age: 84
End: 2024-06-05
Payer: MEDICARE

## 2024-06-05 VITALS
BODY MASS INDEX: 30.21 KG/M2 | DIASTOLIC BLOOD PRESSURE: 70 MMHG | HEART RATE: 70 BPM | HEIGHT: 61 IN | OXYGEN SATURATION: 98 % | WEIGHT: 160 LBS | SYSTOLIC BLOOD PRESSURE: 108 MMHG

## 2024-06-05 DIAGNOSIS — R09.89 OTHER SPECIFIED SYMPTOMS AND SIGNS INVOLVING THE CIRCULATORY AND RESPIRATORY SYSTEMS: ICD-10-CM

## 2024-06-05 DIAGNOSIS — I25.10 ATHEROSCLEROTIC HEART DISEASE OF NATIVE CORONARY ARTERY W/OUT ANGINA PECTORIS: ICD-10-CM

## 2024-06-05 DIAGNOSIS — E78.5 HYPERLIPIDEMIA, UNSPECIFIED: ICD-10-CM

## 2024-06-05 DIAGNOSIS — R01.1 CARDIAC MURMUR, UNSPECIFIED: ICD-10-CM

## 2024-06-05 DIAGNOSIS — E11.9 TYPE 2 DIABETES MELLITUS W/OUT COMPLICATIONS: ICD-10-CM

## 2024-06-05 DIAGNOSIS — I10 ESSENTIAL (PRIMARY) HYPERTENSION: ICD-10-CM

## 2024-06-05 DIAGNOSIS — R06.02 SHORTNESS OF BREATH: ICD-10-CM

## 2024-06-05 PROCEDURE — 93880 EXTRACRANIAL BILAT STUDY: CPT

## 2024-06-05 PROCEDURE — 93306 TTE W/DOPPLER COMPLETE: CPT

## 2024-06-05 PROCEDURE — 93000 ELECTROCARDIOGRAM COMPLETE: CPT

## 2024-06-05 PROCEDURE — G2211 COMPLEX E/M VISIT ADD ON: CPT

## 2024-06-05 PROCEDURE — 99204 OFFICE O/P NEW MOD 45 MIN: CPT

## 2024-06-05 RX ORDER — ALPRAZOLAM 0.5 MG/1
0.5 TABLET ORAL
Refills: 0 | Status: ACTIVE | COMMUNITY

## 2024-06-05 RX ORDER — RISPERIDONE 1 MG/1
1 TABLET, FILM COATED ORAL
Refills: 0 | Status: ACTIVE | COMMUNITY

## 2024-06-05 RX ORDER — SOLIFENACIN SUCCINATE 5 MG/1
5 TABLET ORAL
Refills: 0 | Status: ACTIVE | COMMUNITY

## 2024-06-05 RX ORDER — PANTOPRAZOLE SODIUM 40 MG/1
40 TABLET, DELAYED RELEASE ORAL
Refills: 0 | Status: ACTIVE | COMMUNITY

## 2024-06-05 RX ORDER — CLOPIDOGREL 75 MG/1
75 TABLET, FILM COATED ORAL
Refills: 0 | Status: ACTIVE | COMMUNITY

## 2024-06-05 RX ORDER — ATORVASTATIN CALCIUM 40 MG/1
40 TABLET, FILM COATED ORAL
Refills: 0 | Status: ACTIVE | COMMUNITY

## 2024-06-05 RX ORDER — LINAGLIPTIN 5 MG/1
5 TABLET, FILM COATED ORAL
Refills: 0 | Status: ACTIVE | COMMUNITY

## 2024-06-05 RX ORDER — ASPIRIN 81 MG/1
81 TABLET ORAL
Refills: 0 | Status: ACTIVE | COMMUNITY

## 2024-06-05 RX ORDER — SERTRALINE HYDROCHLORIDE 100 MG/1
100 TABLET, FILM COATED ORAL
Refills: 0 | Status: ACTIVE | COMMUNITY

## 2024-06-05 RX ORDER — FLUTICASONE FUROATE, UMECLIDINIUM BROMIDE AND VILANTEROL TRIFENATATE 100; 62.5; 25 UG/1; UG/1; UG/1
100-62.5-25 POWDER RESPIRATORY (INHALATION)
Refills: 0 | Status: ACTIVE | COMMUNITY

## 2024-06-05 RX ORDER — CARVEDILOL 25 MG/1
25 TABLET, FILM COATED ORAL
Refills: 0 | Status: ACTIVE | COMMUNITY

## 2024-06-06 NOTE — CARDIOLOGY SUMMARY
[de-identified] : 06/05/2024: NSR, (+) T wave inversions in anterolateral leads.  =======================================================

## 2024-06-06 NOTE — DISCUSSION/SUMMARY
[EKG obtained to assist in diagnosis and management of assessed problem(s)] : EKG obtained to assist in diagnosis and management of assessed problem(s) [FreeTextEntry1] : EKG performed today revealed NSR, (+) T wave inversions in anterolateral leads.   SOB: Recommend an echocardiogram to evaluate LV function.  Bruit: Recommend a carotid duplex to evaluate bruit.  CAD: The impression is coronary artery disease. Currently, the condition is stable. There are no changes in medication management. Continue current medical therapy. Other planned treatment includes dietary modification, an exercise regimen, and weight reduction.  HTN: The impression is hypertension. Currently, the condition is controlled. There are no changes in medication management. Continue current medical therapy. Other planned treatments include an exercise regimen, weight loss, low sodium diet, and alcohol moderation.  HLD: The impression is hyperlipidemia. There are no changes in medication management. Continue current medical therapy. Other planned treatment includes diet modification, exercise, and weight loss.  DM: The impression is diabetes mellitus. There are no changes in medication management. Patient advised to continue taking medications as prescribed, closely monitor blood sugar at home, follow a diabetic diet, exercise, lose weight, and follow up for continued monitoring. Discussed importance of diet and exercise to improve glycemic control.  BOOP: Care as per pulm.   Patient has not had blood work done recently. Lab requisition provided to check A1CG, CBC, CMP, Lipid Profile, Lipoprotein A, Apolipoprotein B, TSH, and UA.  Instructed to follow up after testing is complete.  Plan was discussed with the patient.

## 2024-06-06 NOTE — PHYSICAL EXAM
[Well Developed] : well developed [Well Nourished] : well nourished [No Acute Distress] : no acute distress [Normal Conjunctiva] : normal conjunctiva [Normal Venous Pressure] : normal venous pressure [Normal S1, S2] : normal S1, S2 [No Murmur] : no murmur [No Rub] : no rub [No Gallop] : no gallop [Clear Lung Fields] : clear lung fields [Good Air Entry] : good air entry [No Respiratory Distress] : no respiratory distress  [Soft] : abdomen soft [Non Tender] : non-tender [No Masses/organomegaly] : no masses/organomegaly [Normal Bowel Sounds] : normal bowel sounds [Normal Gait] : normal gait [No Edema] : no edema [No Cyanosis] : no cyanosis [No Clubbing] : no clubbing [No Varicosities] : no varicosities [No Rash] : no rash [No Skin Lesions] : no skin lesions [Moves all extremities] : moves all extremities [No Focal Deficits] : no focal deficits [Normal Speech] : normal speech [Alert and Oriented] : alert and oriented [Normal memory] : normal memory [de-identified] : (+) Carotid Bruit

## 2024-06-06 NOTE — REVIEW OF SYSTEMS
[Negative] : Heme/Lymph [Chest Discomfort] : no chest discomfort [Lower Ext Edema] : no extremity edema [Leg Claudication] : no intermittent leg claudication [Palpitations] : no palpitations [Syncope] : no syncope [FreeTextEntry5] : (+) SOB [FreeTextEntry6] : (+) SOB

## 2024-06-06 NOTE — HISTORY OF PRESENT ILLNESS
[FreeTextEntry1] : KIMBER BARBER is an oxygen-dependent 83-year-old female, with a PMHx significant for CAD s/p stent 2019, HTN, HLD, DM, COPD (on home O2), ILD, and BOOP, who presents today for cardiac evaluation and to establish care. Patient endorses chronic SOB. Denies any chest pain. Otherwise: (-) leg swelling/pain, (-) palpitations, (-) dizziness.  Social History: (+) Former heavy smoker: quit 1997, (-) EtOH, (-) Illicit drug use.

## 2024-11-15 ENCOUNTER — APPOINTMENT (OUTPATIENT)
Dept: CARDIOLOGY | Facility: CLINIC | Age: 84
End: 2024-11-15
Payer: MEDICARE

## 2024-11-15 VITALS
HEIGHT: 61 IN | OXYGEN SATURATION: 99 % | SYSTOLIC BLOOD PRESSURE: 160 MMHG | BODY MASS INDEX: 29.27 KG/M2 | DIASTOLIC BLOOD PRESSURE: 90 MMHG | WEIGHT: 155 LBS | HEART RATE: 77 BPM

## 2024-11-15 DIAGNOSIS — R07.89 OTHER CHEST PAIN: ICD-10-CM

## 2024-11-15 DIAGNOSIS — I10 ESSENTIAL (PRIMARY) HYPERTENSION: ICD-10-CM

## 2024-11-15 DIAGNOSIS — E11.9 TYPE 2 DIABETES MELLITUS W/OUT COMPLICATIONS: ICD-10-CM

## 2024-11-15 DIAGNOSIS — I25.10 ATHEROSCLEROTIC HEART DISEASE OF NATIVE CORONARY ARTERY W/OUT ANGINA PECTORIS: ICD-10-CM

## 2024-11-15 DIAGNOSIS — E78.5 HYPERLIPIDEMIA, UNSPECIFIED: ICD-10-CM

## 2024-11-15 PROCEDURE — G2211 COMPLEX E/M VISIT ADD ON: CPT

## 2024-11-15 PROCEDURE — 99214 OFFICE O/P EST MOD 30 MIN: CPT

## 2024-11-15 PROCEDURE — 93000 ELECTROCARDIOGRAM COMPLETE: CPT

## 2025-03-31 NOTE — PHYSICAL THERAPY INITIAL EVALUATION ADULT - BED MOBILITY LIMITATIONS, REHAB EVAL
Detail Level: Zone Render Risk Assessment In Note?: yes Additional Notes: Pt has no history of psoriasis or childhood eczema, started 6mos ago . 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

## 2025-07-02 ENCOUNTER — APPOINTMENT (OUTPATIENT)
Dept: UROLOGY | Facility: CLINIC | Age: 85
End: 2025-07-02
Payer: MEDICARE

## 2025-07-02 ENCOUNTER — NON-APPOINTMENT (OUTPATIENT)
Age: 85
End: 2025-07-02

## 2025-07-02 VITALS
BODY MASS INDEX: 27.38 KG/M2 | HEIGHT: 61 IN | RESPIRATION RATE: 18 BRPM | WEIGHT: 145 LBS | DIASTOLIC BLOOD PRESSURE: 76 MMHG | OXYGEN SATURATION: 99 % | HEART RATE: 71 BPM | SYSTOLIC BLOOD PRESSURE: 123 MMHG | TEMPERATURE: 98 F

## 2025-07-02 PROBLEM — Z87.891 FORMER SMOKER: Status: ACTIVE | Noted: 2025-07-02

## 2025-07-02 PROBLEM — R33.9 URINARY RETENTION: Status: ACTIVE | Noted: 2025-07-02

## 2025-07-02 PROBLEM — Z87.440 HISTORY OF UTI: Status: ACTIVE | Noted: 2025-07-02

## 2025-07-02 PROBLEM — Z83.3 FAMILY HISTORY OF DIABETES MELLITUS: Status: ACTIVE | Noted: 2025-07-02

## 2025-07-02 PROBLEM — Z97.8 FOLEY CATHETER IN PLACE: Status: ACTIVE | Noted: 2025-07-02

## 2025-07-02 PROBLEM — Z82.49 FAMILY HISTORY OF HYPERTENSION: Status: ACTIVE | Noted: 2025-07-02

## 2025-07-02 PROBLEM — Z80.0 FAMILY HISTORY OF MALIGNANT NEOPLASM OF COLON: Status: ACTIVE | Noted: 2025-07-02

## 2025-07-02 PROCEDURE — 99204 OFFICE O/P NEW MOD 45 MIN: CPT

## 2025-07-02 RX ORDER — ACETAMINOPHEN 325 MG/10.15ML
325 SOLUTION ORAL
Refills: 0 | Status: ACTIVE | COMMUNITY

## 2025-07-02 RX ORDER — PANTOPRAZOLE 40 MG/1
40 TABLET, DELAYED RELEASE ORAL
Refills: 0 | Status: ACTIVE | COMMUNITY

## 2025-07-02 RX ORDER — SENNOSIDES 8.6 MG/1
8.6 TABLET ORAL
Refills: 0 | Status: ACTIVE | COMMUNITY

## 2025-07-02 RX ORDER — SERTRALINE HYDROCHLORIDE 100 MG/1
100 TABLET, FILM COATED ORAL
Refills: 0 | Status: ACTIVE | COMMUNITY

## 2025-07-02 RX ORDER — CARVEDILOL 6.25 MG/1
6.25 TABLET, FILM COATED ORAL
Refills: 0 | Status: ACTIVE | COMMUNITY

## 2025-07-02 RX ORDER — SAXAGLIPTIN 5 MG/1
5 TABLET, FILM COATED ORAL
Refills: 0 | Status: ACTIVE | COMMUNITY

## 2025-07-02 RX ORDER — TAMSULOSIN HYDROCHLORIDE 0.4 MG/1
0.4 CAPSULE ORAL
Qty: 30 | Refills: 0 | Status: ACTIVE | COMMUNITY
Start: 2025-07-02 | End: 1900-01-01

## 2025-07-02 RX ORDER — APIXABAN 5 MG/1
5 TABLET, FILM COATED ORAL
Refills: 0 | Status: ACTIVE | COMMUNITY

## 2025-07-02 RX ORDER — ALPRAZOLAM 0.5 MG/1
0.5 TABLET ORAL
Refills: 0 | Status: ACTIVE | COMMUNITY

## 2025-07-02 RX ORDER — L. ACIDOPHILUS/L.BULGARICUS 1MM CELL
TABLET ORAL
Refills: 0 | Status: ACTIVE | COMMUNITY

## 2025-07-02 RX ORDER — ATORVASTATIN CALCIUM 40 MG/1
40 TABLET, FILM COATED ORAL
Refills: 0 | Status: ACTIVE | COMMUNITY

## 2025-07-02 RX ORDER — POVIDONE-IODINE 10 %
10 OINTMENT (GRAM) TOPICAL
Refills: 0 | Status: ACTIVE | COMMUNITY

## 2025-08-08 ENCOUNTER — APPOINTMENT (OUTPATIENT)
Dept: UROLOGY | Facility: CLINIC | Age: 85
End: 2025-08-08